# Patient Record
Sex: FEMALE | Race: WHITE | NOT HISPANIC OR LATINO | Employment: OTHER | URBAN - METROPOLITAN AREA
[De-identification: names, ages, dates, MRNs, and addresses within clinical notes are randomized per-mention and may not be internally consistent; named-entity substitution may affect disease eponyms.]

---

## 2021-07-15 ENCOUNTER — HISTORICAL (OUTPATIENT)
Dept: ADMINISTRATIVE | Facility: HOSPITAL | Age: 81
End: 2021-07-15

## 2021-07-15 LAB — POC CREATININE: 1.7 MG/DL (ref 0.6–1.3)

## 2022-09-13 ENCOUNTER — LAB VISIT (OUTPATIENT)
Dept: LAB | Facility: HOSPITAL | Age: 82
End: 2022-09-13
Attending: INTERNAL MEDICINE
Payer: MEDICARE

## 2022-09-13 DIAGNOSIS — K92.1 BLOOD IN STOOL: Primary | ICD-10-CM

## 2022-09-13 LAB
BASOPHILS # BLD AUTO: 0.05 X10(3)/MCL (ref 0–0.2)
BASOPHILS NFR BLD AUTO: 0.7 %
EOSINOPHIL # BLD AUTO: 0.39 X10(3)/MCL (ref 0–0.9)
EOSINOPHIL NFR BLD AUTO: 5.3 %
ERYTHROCYTE [DISTWIDTH] IN BLOOD BY AUTOMATED COUNT: 15.1 % (ref 11.5–17)
HCT VFR BLD AUTO: 37.9 % (ref 37–47)
HGB BLD-MCNC: 12 GM/DL (ref 12–16)
IMM GRANULOCYTES # BLD AUTO: 0.03 X10(3)/MCL (ref 0–0.04)
IMM GRANULOCYTES NFR BLD AUTO: 0.4 %
LYMPHOCYTES # BLD AUTO: 1.57 X10(3)/MCL (ref 0.6–4.6)
LYMPHOCYTES NFR BLD AUTO: 21.3 %
MCH RBC QN AUTO: 28.9 PG (ref 27–31)
MCHC RBC AUTO-ENTMCNC: 31.7 MG/DL (ref 33–36)
MCV RBC AUTO: 91.3 FL (ref 80–94)
MONOCYTES # BLD AUTO: 0.51 X10(3)/MCL (ref 0.1–1.3)
MONOCYTES NFR BLD AUTO: 6.9 %
NEUTROPHILS # BLD AUTO: 4.8 X10(3)/MCL (ref 2.1–9.2)
NEUTROPHILS NFR BLD AUTO: 65.4 %
NRBC BLD AUTO-RTO: 0 %
PLATELET # BLD AUTO: 188 X10(3)/MCL (ref 130–400)
PMV BLD AUTO: 11.6 FL (ref 7.4–10.4)
RBC # BLD AUTO: 4.15 X10(6)/MCL (ref 4.2–5.4)
WBC # SPEC AUTO: 7.4 X10(3)/MCL (ref 4.5–11.5)

## 2022-09-13 PROCEDURE — 85025 COMPLETE CBC W/AUTO DIFF WBC: CPT

## 2022-09-13 PROCEDURE — 36415 COLL VENOUS BLD VENIPUNCTURE: CPT

## 2022-11-14 ENCOUNTER — HOSPITAL ENCOUNTER (EMERGENCY)
Facility: HOSPITAL | Age: 82
Discharge: HOME | End: 2022-11-14
Attending: INTERNAL MEDICINE
Payer: MEDICARE

## 2022-11-14 VITALS
WEIGHT: 201 LBS | HEIGHT: 61 IN | DIASTOLIC BLOOD PRESSURE: 74 MMHG | RESPIRATION RATE: 18 BRPM | OXYGEN SATURATION: 98 % | BODY MASS INDEX: 37.95 KG/M2 | HEART RATE: 88 BPM | TEMPERATURE: 98 F | SYSTOLIC BLOOD PRESSURE: 197 MMHG

## 2022-11-14 DIAGNOSIS — R10.10 UPPER ABDOMINAL PAIN: Primary | ICD-10-CM

## 2022-11-14 DIAGNOSIS — K59.01 SLOW TRANSIT CONSTIPATION: ICD-10-CM

## 2022-11-14 LAB
APPEARANCE UR: ABNORMAL
BACTERIA #/AREA URNS AUTO: ABNORMAL /HPF
BILIRUB UR QL STRIP.AUTO: NEGATIVE MG/DL
COLOR UR AUTO: ABNORMAL
GLUCOSE UR QL STRIP.AUTO: NEGATIVE MG/DL
KETONES UR QL STRIP.AUTO: NEGATIVE MG/DL
LEUKOCYTE ESTERASE UR QL STRIP.AUTO: NEGATIVE UNIT/L
NITRITE UR QL STRIP.AUTO: NEGATIVE
PH UR STRIP.AUTO: 7 [PH]
PROT UR QL STRIP.AUTO: NEGATIVE MG/DL
RBC #/AREA URNS AUTO: ABNORMAL /HPF
RBC UR QL AUTO: ABNORMAL UNIT/L
SP GR UR STRIP.AUTO: 1.01
SQUAMOUS #/AREA URNS AUTO: ABNORMAL /HPF
UROBILINOGEN UR STRIP-ACNC: 0.2 MG/DL
WBC #/AREA URNS AUTO: ABNORMAL /HPF

## 2022-11-14 PROCEDURE — 81001 URINALYSIS AUTO W/SCOPE: CPT | Performed by: INTERNAL MEDICINE

## 2022-11-14 PROCEDURE — 81003 URINALYSIS AUTO W/O SCOPE: CPT | Performed by: INTERNAL MEDICINE

## 2022-11-14 PROCEDURE — 25000003 PHARM REV CODE 250: Performed by: INTERNAL MEDICINE

## 2022-11-14 PROCEDURE — 99284 EMERGENCY DEPT VISIT MOD MDM: CPT

## 2022-11-14 RX ORDER — MAG HYDROX/ALUMINUM HYD/SIMETH 200-200-20
30 SUSPENSION, ORAL (FINAL DOSE FORM) ORAL ONCE
Status: COMPLETED | OUTPATIENT
Start: 2022-11-14 | End: 2022-11-14

## 2022-11-14 RX ORDER — LIDOCAINE HYDROCHLORIDE 20 MG/ML
10 SOLUTION OROPHARYNGEAL ONCE
Status: COMPLETED | OUTPATIENT
Start: 2022-11-14 | End: 2022-11-14

## 2022-11-14 RX ORDER — ONDANSETRON 4 MG/1
4 TABLET, ORALLY DISINTEGRATING ORAL ONCE
Status: COMPLETED | OUTPATIENT
Start: 2022-11-14 | End: 2022-11-14

## 2022-11-14 RX ORDER — SUCRALFATE 1 G/1
1 TABLET ORAL
Qty: 40 TABLET | Refills: 0 | Status: SHIPPED | OUTPATIENT
Start: 2022-11-14 | End: 2022-11-24

## 2022-11-14 RX ORDER — LACTULOSE 10 G/15ML
20 SOLUTION ORAL 3 TIMES DAILY PRN
Qty: 473 ML | Refills: 1 | Status: SHIPPED | OUTPATIENT
Start: 2022-11-14

## 2022-11-14 RX ORDER — ONDANSETRON 4 MG/1
4 TABLET, ORALLY DISINTEGRATING ORAL EVERY 6 HOURS PRN
Qty: 15 TABLET | Refills: 0 | Status: SHIPPED | OUTPATIENT
Start: 2022-11-14

## 2022-11-14 RX ADMIN — LIDOCAINE HYDROCHLORIDE 10 ML: 20 SOLUTION ORAL; TOPICAL at 06:11

## 2022-11-14 RX ADMIN — ALUMINUM HYDROXIDE, MAGNESIUM HYDROXIDE, AND DIMETHICONE 30 ML: 200; 20; 200 SUSPENSION ORAL at 06:11

## 2022-11-14 RX ADMIN — ONDANSETRON 4 MG: 4 TABLET, ORALLY DISINTEGRATING ORAL at 06:11

## 2022-11-14 NOTE — ED PROVIDER NOTES
Source of History:  Patient, no limitations    Chief complaint:  Abdominal Pain      HPI:  Nell Mock is a 81 y.o. female presenting with Abdominal Pain         Patient presents for evaluation of abdominal pain. Onset of symptoms was gradual starting a few days ago with stable course since that time. The pain is located epigastric without radiation. The pain is rated as moderate. The pain is made worse by nothing and is relieved by nothing. The patient also complains of constipation and nausea. The patient denies fever, N/V. The pertinent past history includes hernia.         Review of Systems   Constitutional symptoms:  Negative except as documented in HPI.   Skin symptoms:  Negative except as documented in HPI.   HEENT symptoms:  Negative except as documented in HPI.   Respiratory symptoms:  Negative except as documented in HPI.   Cardiovascular symptoms:  Negative except as documented in HPI.   Gastrointestinal symptoms:  Negative except as documented in HPI.    Genitourinary symptoms:  Negative except as documented in HPI.   Musculoskeletal symptoms:  Negative except as documented in HPI.   Neurologic symptoms:  Negative except as documented in HPI.   Psychiatric symptoms:  Negative except as documented in HPI.   Allergy/immunologic symptoms:  Negative except as documented in HPI.             Additional review of systems information: All other systems reviewed and otherwise negative.      Review of patient's allergies indicates:   Allergen Reactions    Latex Swelling    Benazepril      cough  cough      Amoxicillin-pot clavulanate      Patient does not recall reaction type  Patient does not recall reaction type      Ciprofloxacin Rash     Other reaction(s): Bactrim  Urticaria,hives  Urticaria,hives  Urticaria,hives  Urticaria,hives      Sulfamethoxazole-trimethoprim Itching and Other (See Comments)     bleeding  Bleeding (C diff colitis)         PMH:  As per HPI and below:    No past medical history  "on file.     No family history on file.    No past surgical history on file.         There is no problem list on file for this patient.       Physical Exam:    BP (!) 197/74   Pulse 88   Temp 98.2 °F (36.8 °C)   Resp 18   Ht 5' 1" (1.549 m)   Wt 91.2 kg (201 lb)   SpO2 98%   Breastfeeding No   BMI 37.98 kg/m²     Nursing note and vital signs reviewed.    General:  Alert, no acute distress.   Skin: Normal for Ethnic Origin, No cyanosis  HEENT: Normocephalic and atraumatic, Vision unchanged, Pupils symmetric, No icterus , Nasal mucosa is pink and moist  Cardiovascular:  Regular rate and rhythm, No edema  Chest Wall: No deformity, equal chest rise  Respiratory:  Lungs are clear to auscultation, respirations are non-labored.    Musculoskeletal:  No deformity, Normal perfusion to all extremities  Back: No bony tenderness  : No suprapubic pain, No CVA tenderness  Gastrointestinal:  Soft, reducible ventral hernia, mild tenderness, no rebound, Non distended, Normal bowel sounds.    Neurological:  Alert and oriented to person, place, time, and situation, normal motor observed, normal speech observed.    Psychiatric:  Cooperative, appropriate mood & affect.        Labs that have been ordered have been independently reviewed and interpreted by myself.     Old Chart Reviewed.      Initial Impression/ Differential Dx:  GERD, intestinal spasm, gastroenteritis, gastritis, ulcer, cholecystitis, cholelithiasis, gallstones, pancreatitis, ileus, small bowel obstruction, appendicitis, diverticulitis, colitis, constipation, intestinal gas pain.      MDM:      Reviewed Nurses Note.    Reviewed Pertinent old records.    Orders Placed This Encounter    X-Ray Abdomen Flat And Erect    Urinalysis, Reflex to Urine Culture Urine, Clean Catch    Urinalysis, Microscopic    ondansetron disintegrating tablet 4 mg    AND Linked Order Group     aluminum-magnesium hydroxide-simethicone 200-200-20 mg/5 mL suspension 30 mL     LIDOcaine HCl " 2% oral solution 10 mL    lactulose (CHRONULAC) 20 gram/30 mL Soln    ondansetron (ZOFRAN-ODT) 4 MG TbDL    sucralfate (CARAFATE) 1 gram tablet                    Labs Reviewed   URINALYSIS, REFLEX TO URINE CULTURE - Abnormal; Notable for the following components:       Result Value    Appearance, UA Hazy (*)     Blood, UA Moderate (*)     All other components within normal limits   URINALYSIS, MICROSCOPIC - Abnormal; Notable for the following components:    RBC, UA 11-20 (*)     Squamous Epithelial Cells, UA Few (*)     All other components within normal limits          X-Ray Abdomen Flat And Erect   Final Result           Admission on 11/14/2022   Component Date Value Ref Range Status    Color, UA 11/14/2022 Straw  Yellow, Light-Yellow, Dark Yellow, Laurie, Straw Final    Appearance, UA 11/14/2022 Hazy (A)  Clear Final    Specific Gravity, UA 11/14/2022 1.010   Final    pH, UA 11/14/2022 7.0  5.0 - 8.5 Final    Protein, UA 11/14/2022 Negative  Negative mg/dL Final    Glucose, UA 11/14/2022 Negative  Negative, Normal mg/dL Final    Ketones, UA 11/14/2022 Negative  Negative mg/dL Final    Blood, UA 11/14/2022 Moderate (A)  Negative unit/L Final    Bilirubin, UA 11/14/2022 Negative  Negative mg/dL Final    Urobilinogen, UA 11/14/2022 0.2  0.2, 1.0, Normal mg/dL Final    Nitrites, UA 11/14/2022 Negative  Negative Final    Leukocyte Esterase, UA 11/14/2022 Negative  Negative unit/L Final    Bacteria, UA 11/14/2022 None Seen  None Seen, Rare, Occasional /HPF Final    RBC, UA 11/14/2022 11-20 (A)  None Seen, 0-2, 3-5, 0-5 /HPF Final    WBC, UA 11/14/2022 0-2  None Seen, 0-2, 3-5, 0-5 /HPF Final    Squamous Epithelial Cells, UA 11/14/2022 Few (A)  None Seen, Rare, Occasional, Occ /HPF Final       Imaging Results              X-Ray Abdomen Flat And Erect (Final result)  Result time 11/14/22 18:33:39      Final result by Ken Ko MD (11/14/22 18:33:39)                   Narrative:    EXAMINATION  XR ABDOMEN FLAT AND  ERECT    CLINICAL HISTORY  nausea;    TECHNIQUE  A total of 3 images submitted of the abdomen.    COMPARISON  None available at the time of initial interpretation.    FINDINGS  Lines/tubes/devices: none present    There is no evidence of free intraperitoneal air beneath the hemidiaphragms. No abnormal air-fluid levels or findings to suggest mechanical bowel obstruction are identified. No intra-abdominal mass effect is evident.    The visualized lung bases and cardiac contour are unremarkable. Included osseous structures are without acute abnormality.  Degenerative alterations of the spinal column and bony pelvis are incidentally noted.    IMPRESSION  No acute intra-abdominal abnormality.      Electronically signed by: Ken Ko  Date:    11/14/2022  Time:    18:33                                                                         Diagnostic Impression:    1. Upper abdominal pain    2. Slow transit constipation         ED Disposition Condition    Discharge Stable             Follow-up Information       [facility] - Emergency Dept.    Specialty: Emergency Medicine  Why: If symptoms worsen  Contact information:  [address]  [facility] [address] [phone]                            ED Prescriptions       Medication Sig Dispense Start Date End Date Auth. Provider    lactulose (CHRONULAC) 20 gram/30 mL Soln Take 30 mLs (20 g total) by mouth 3 (three) times daily as needed (constipation). 473 mL 11/14/2022 -- Jeff Fletcher, DO    ondansetron (ZOFRAN-ODT) 4 MG TbDL Take 1 tablet (4 mg total) by mouth every 6 (six) hours as needed. 15 tablet 11/14/2022 -- Jeff Fletcher, DO    sucralfate (CARAFATE) 1 gram tablet Take 1 tablet (1 g total) by mouth 4 (four) times daily before meals and nightly. for 10 days 40 tablet 11/14/2022 11/24/2022 Jeff Fletcher, DO          Follow-up Information       Follow up With Specialties Details Why Contact Info     [facility] - Emergency Dept Emergency Medicine  If symptoms worsen [address]  [facility] [id number] [phone]             Jeff Fletcher DO  11/14/22 1846

## 2023-03-29 ENCOUNTER — HOSPITAL ENCOUNTER (EMERGENCY)
Facility: HOSPITAL | Age: 83
Discharge: HOME | End: 2023-03-29
Attending: STUDENT IN AN ORGANIZED HEALTH CARE EDUCATION/TRAINING PROGRAM
Payer: MEDICARE

## 2023-03-29 VITALS
DIASTOLIC BLOOD PRESSURE: 73 MMHG | HEIGHT: 61 IN | WEIGHT: 199 LBS | HEART RATE: 84 BPM | OXYGEN SATURATION: 97 % | RESPIRATION RATE: 20 BRPM | BODY MASS INDEX: 37.57 KG/M2 | TEMPERATURE: 97 F | SYSTOLIC BLOOD PRESSURE: 182 MMHG

## 2023-03-29 DIAGNOSIS — R10.13 EPIGASTRIC ABDOMINAL PAIN: ICD-10-CM

## 2023-03-29 DIAGNOSIS — K21.9 GASTROESOPHAGEAL REFLUX DISEASE, UNSPECIFIED WHETHER ESOPHAGITIS PRESENT: Primary | ICD-10-CM

## 2023-03-29 LAB
ALBUMIN SERPL-MCNC: 3.9 G/DL (ref 3.4–4.8)
ALBUMIN/GLOB SERPL: 1.1 RATIO (ref 1.1–2)
ALP SERPL-CCNC: 149 UNIT/L (ref 40–150)
ALT SERPL-CCNC: 17 UNIT/L (ref 0–55)
APPEARANCE UR: CLEAR
AST SERPL-CCNC: 32 UNIT/L (ref 5–34)
BACTERIA #/AREA URNS AUTO: ABNORMAL /HPF
BASOPHILS # BLD AUTO: 0.05 X10(3)/MCL (ref 0–0.2)
BASOPHILS NFR BLD AUTO: 1 %
BILIRUB UR QL STRIP.AUTO: NEGATIVE MG/DL
BILIRUBIN DIRECT+TOT PNL SERPL-MCNC: 0.2 MG/DL
BUN SERPL-MCNC: 17.3 MG/DL (ref 9.8–20.1)
CALCIUM SERPL-MCNC: 10.4 MG/DL (ref 8.4–10.2)
CHLORIDE SERPL-SCNC: 106 MMOL/L (ref 98–107)
CO2 SERPL-SCNC: 25 MMOL/L (ref 23–31)
COLOR UR AUTO: YELLOW
CREAT SERPL-MCNC: 1.17 MG/DL (ref 0.55–1.02)
EOSINOPHIL # BLD AUTO: 0.06 X10(3)/MCL (ref 0–0.9)
EOSINOPHIL NFR BLD AUTO: 1.3 %
ERYTHROCYTE [DISTWIDTH] IN BLOOD BY AUTOMATED COUNT: 14.6 % (ref 11.5–17)
GFR SERPLBLD CREATININE-BSD FMLA CKD-EPI: 47 MLS/MIN/1.73/M2
GLOBULIN SER-MCNC: 3.5 GM/DL (ref 2.4–3.5)
GLUCOSE SERPL-MCNC: 102 MG/DL (ref 82–115)
GLUCOSE UR QL STRIP.AUTO: NEGATIVE MG/DL
HCT VFR BLD AUTO: 42.8 % (ref 37–47)
HGB BLD-MCNC: 13.5 G/DL (ref 12–16)
IMM GRANULOCYTES # BLD AUTO: 0 X10(3)/MCL (ref 0–0.04)
IMM GRANULOCYTES NFR BLD AUTO: 0 %
KETONES UR QL STRIP.AUTO: NEGATIVE MG/DL
LEUKOCYTE ESTERASE UR QL STRIP.AUTO: NEGATIVE UNIT/L
LIPASE SERPL-CCNC: 72 U/L
LYMPHOCYTES # BLD AUTO: 1.59 X10(3)/MCL (ref 0.6–4.6)
LYMPHOCYTES NFR BLD AUTO: 33.1 %
MCH RBC QN AUTO: 27.6 PG (ref 27–31)
MCHC RBC AUTO-ENTMCNC: 31.5 G/DL (ref 33–36)
MCV RBC AUTO: 87.5 FL (ref 80–94)
MONOCYTES # BLD AUTO: 0.28 X10(3)/MCL (ref 0.1–1.3)
MONOCYTES NFR BLD AUTO: 5.8 %
NEUTROPHILS # BLD AUTO: 2.82 X10(3)/MCL (ref 2.1–9.2)
NEUTROPHILS NFR BLD AUTO: 58.8 %
NITRITE UR QL STRIP.AUTO: NEGATIVE
NRBC BLD AUTO-RTO: 0 %
PH UR STRIP.AUTO: 5.5 [PH]
PLATELET # BLD AUTO: 157 X10(3)/MCL (ref 130–400)
PMV BLD AUTO: 10.7 FL (ref 7.4–10.4)
POTASSIUM SERPL-SCNC: 4.2 MMOL/L (ref 3.5–5.1)
PROT SERPL-MCNC: 7.4 GM/DL (ref 5.8–7.6)
PROT UR QL STRIP.AUTO: NEGATIVE MG/DL
RBC # BLD AUTO: 4.89 X10(6)/MCL (ref 4.2–5.4)
RBC #/AREA URNS AUTO: ABNORMAL /HPF
RBC UR QL AUTO: ABNORMAL UNIT/L
SODIUM SERPL-SCNC: 143 MMOL/L (ref 136–145)
SP GR UR STRIP.AUTO: 1.02
SQUAMOUS #/AREA URNS AUTO: ABNORMAL /HPF
TROPONIN I SERPL-MCNC: 0.01 NG/ML (ref 0–0.04)
UROBILINOGEN UR STRIP-ACNC: 0.2 MG/DL
WBC # SPEC AUTO: 4.8 X10(3)/MCL (ref 4.5–11.5)
WBC #/AREA URNS AUTO: ABNORMAL /HPF

## 2023-03-29 PROCEDURE — 93005 ELECTROCARDIOGRAM TRACING: CPT

## 2023-03-29 PROCEDURE — 93010 ELECTROCARDIOGRAM REPORT: CPT | Mod: ,,, | Performed by: INTERNAL MEDICINE

## 2023-03-29 PROCEDURE — 84484 ASSAY OF TROPONIN QUANT: CPT | Performed by: PHYSICIAN ASSISTANT

## 2023-03-29 PROCEDURE — 85025 COMPLETE CBC W/AUTO DIFF WBC: CPT | Performed by: PHYSICIAN ASSISTANT

## 2023-03-29 PROCEDURE — 93010 EKG 12-LEAD: ICD-10-PCS | Mod: ,,, | Performed by: INTERNAL MEDICINE

## 2023-03-29 PROCEDURE — 80053 COMPREHEN METABOLIC PANEL: CPT | Performed by: PHYSICIAN ASSISTANT

## 2023-03-29 PROCEDURE — 25000003 PHARM REV CODE 250: Performed by: STUDENT IN AN ORGANIZED HEALTH CARE EDUCATION/TRAINING PROGRAM

## 2023-03-29 PROCEDURE — 81001 URINALYSIS AUTO W/SCOPE: CPT | Performed by: PHYSICIAN ASSISTANT

## 2023-03-29 PROCEDURE — 99284 EMERGENCY DEPT VISIT MOD MDM: CPT

## 2023-03-29 PROCEDURE — 83690 ASSAY OF LIPASE: CPT | Performed by: PHYSICIAN ASSISTANT

## 2023-03-29 RX ORDER — MAG HYDROX/ALUMINUM HYD/SIMETH 200-200-20
30 SUSPENSION, ORAL (FINAL DOSE FORM) ORAL ONCE
Status: COMPLETED | OUTPATIENT
Start: 2023-03-29 | End: 2023-03-29

## 2023-03-29 RX ORDER — PANTOPRAZOLE SODIUM 40 MG/1
40 TABLET, DELAYED RELEASE ORAL DAILY
Qty: 30 TABLET | Refills: 11 | Status: SHIPPED | OUTPATIENT
Start: 2023-03-29 | End: 2024-03-28

## 2023-03-29 RX ORDER — LIDOCAINE HYDROCHLORIDE 20 MG/ML
15 SOLUTION OROPHARYNGEAL ONCE
Status: COMPLETED | OUTPATIENT
Start: 2023-03-29 | End: 2023-03-29

## 2023-03-29 RX ADMIN — LIDOCAINE HYDROCHLORIDE 15 ML: 20 SOLUTION ORAL; TOPICAL at 06:03

## 2023-03-29 RX ADMIN — ALUMINUM HYDROXIDE, MAGNESIUM HYDROXIDE, AND SIMETHICONE 30 ML: 200; 200; 20 SUSPENSION ORAL at 06:03

## 2023-03-29 NOTE — FIRST PROVIDER EVALUATION
"Medical screening examination initiated.  I have conducted a focused provider triage encounter, findings are as follows:    Chief Complaint   Patient presents with    Abdominal Pain     Pt to er c/o recurrent intermittent abd pain onset 5 days ago, denies n/v/d.     Brief history of present illness: 82 y.o. female presents to the ED with epigastric abdominal pain onset 5 days ago.  Denies shortness of breath, nausea, vomiting, fever, chills.  History of a hiatal hernia.    BP (!) 182/73 (BP Location: Left arm, Patient Position: Sitting)   Pulse 84   Temp 97.2 °F (36.2 °C) (Temporal)   Resp 20   Ht 5' 1" (1.549 m)   Wt 90.3 kg (199 lb)   SpO2 97%   BMI 37.60 kg/m²     Pertinent physical exam: Awake, alert, ambulatory, non-labored respirations    Brief workup plan:  labs, EKG, cxr    Preliminary workup initiated; this workup will be continued and followed by the physician or advanced practice provider that is assigned to the patient when roomed.  "

## 2023-03-29 NOTE — ED PROVIDER NOTES
Encounter Date: 3/29/2023       History     Chief Complaint   Patient presents with    Abdominal Pain     Pt to er c/o recurrent intermittent abd pain onset 5 days ago, denies n/v/d.     HPI    82-year-old female with a past medical history of hypertension, CML, CKD, GERD with hiatal hernia presents emergency department for epigastric pain which she states is burning.  Patient states she thinks it is her gastritis flaring.  She denies any chest pain or shortness of breath.  No vomiting or nausea.  No constipation or diarrhea.    Review of patient's allergies indicates:   Allergen Reactions    Latex Swelling    Benazepril      cough  cough      Amoxicillin-pot clavulanate      Patient does not recall reaction type  Patient does not recall reaction type      Ciprofloxacin Rash     Other reaction(s): Bactrim  Urticaria,hives  Urticaria,hives  Urticaria,hives  Urticaria,hives      Sulfamethoxazole-trimethoprim Itching and Other (See Comments)     bleeding  Bleeding (C diff colitis)       No past medical history on file.  No past surgical history on file.  No family history on file.     Review of Systems   Constitutional:  Negative for fever.   Respiratory:  Negative for cough and shortness of breath.    Cardiovascular:  Negative for chest pain.   Gastrointestinal:  Positive for abdominal pain. Negative for constipation, diarrhea, nausea and vomiting.   All other systems reviewed and are negative.    Physical Exam     Initial Vitals [03/29/23 1607]   BP Pulse Resp Temp SpO2   (!) 182/73 84 20 97.2 °F (36.2 °C) 97 %      MAP       --         Physical Exam    Nursing note and vitals reviewed.  Constitutional: She appears well-developed and well-nourished. She is Obese . No distress.   Cardiovascular:  Normal rate and regular rhythm.           Pulmonary/Chest: No respiratory distress.   Abdominal: Abdomen is soft. There is abdominal tenderness (epigastric).   Musculoskeletal:         General: No tenderness. Normal range of  motion.     Neurological: She is alert.   Skin: Skin is warm. Capillary refill takes less than 2 seconds.   Psychiatric: She has a normal mood and affect. Thought content normal.       ED Course   Procedures  Labs Reviewed   COMPREHENSIVE METABOLIC PANEL - Abnormal; Notable for the following components:       Result Value    Creatinine 1.17 (*)     Calcium Level Total 10.4 (*)     All other components within normal limits   LIPASE - Abnormal; Notable for the following components:    Lipase Level 72 (*)     All other components within normal limits   URINALYSIS, REFLEX TO URINE CULTURE - Abnormal; Notable for the following components:    Blood, UA Small (*)     All other components within normal limits   CBC WITH DIFFERENTIAL - Abnormal; Notable for the following components:    MCHC 31.5 (*)     MPV 10.7 (*)     All other components within normal limits   URINALYSIS, MICROSCOPIC - Abnormal; Notable for the following components:    Bacteria, UA Few (*)     Squamous Epithelial Cells, UA Moderate (*)     All other components within normal limits   TROPONIN I - Normal   CBC W/ AUTO DIFFERENTIAL    Narrative:     The following orders were created for panel order CBC auto differential.  Procedure                               Abnormality         Status                     ---------                               -----------         ------                     CBC with Differential[[id number]]        Abnormal            Final result                 Please view results for these tests on the individual orders.     EKG Readings: (Independently Interpreted)   Initial Reading: No STEMI. Rhythm: Normal Sinus Rhythm. Heart Rate: 69. Ectopy: No Ectopy. Conduction: RBBB. ST Segments: Normal ST Segments. T Waves: Normal. Clinical Impression: Normal Sinus Rhythm with RBBB     Imaging Results    None          Medications   aluminum-magnesium hydroxide-simethicone 200-200-20 mg/5 mL suspension 30 mL (30 mLs Oral Given 3/29/23 2168)      And   LIDOcaine HCl 2% oral solution 15 mL (15 mLs Oral Given 3/29/23 1802)     Medical Decision Making:   Differential Diagnosis:   GERD/gastritis, pancreatitis, ACS, cholecystitis  ED Management:  Laboratory works all within normal limits.  Lipase only minimally elevated.  Will give a GI cocktail and reassess.  Patient agrees with the plan.   Medical Decision Making  Problems Addressed:  Gastroesophageal reflux disease, unspecified whether esophagitis present: chronic illness or injury with exacerbation, progression, or side effects of treatment    Amount and/or Complexity of Data Reviewed  External Data Reviewed: labs and notes.  Labs: ordered. Decision-making details documented in ED Course.  Radiology: ordered and independent interpretation performed. Decision-making details documented in ED Course.    Risk  OTC drugs.  Prescription drug management.              ED Course as of 03/29/23 1843   Wed Mar 29, 2023   1841 Patient resting in bed in no acute distress.  Vital signs significantly improved.  Patient states her pain is resolved after GI cocktail.  Laboratory results are within normal limits.  No indication for CT and patient agrees.  Will discharge with some Protonix.  Patient will follow back up with a GI doctor.  ER precautions given. [BS]      ED Course User Index  [BS] Dm Conrad MD                 Clinical Impression:   Final diagnoses:  [R10.13] Epigastric abdominal pain  [K21.9] Gastroesophageal reflux disease, unspecified whether esophagitis present (Primary)        ED Disposition Condition    Discharge Stable          ED Prescriptions       Medication Sig Dispense Start Date End Date Auth. Provider    pantoprazole (PROTONIX) 40 MG tablet Take 1 tablet (40 mg total) by mouth once daily. 30 tablet 3/29/2023 3/28/2024 Dm Conrad MD          Follow-up Information       Follow up With Specialties Details Why Contact Info    Tasia May MD Family Medicine Schedule an appointment  as soon as possible for a visit   [address]  [address]  [facility] [address]  [phone]      [facility] - Emergency Dept Emergency Medicine Go to  If symptoms worsen [address]  [facility] [phone]    Follow-up with your GI doctor                 Dm Conrad MD  03/29/23 9553

## 2023-06-01 ENCOUNTER — HOSPITAL ENCOUNTER (EMERGENCY)
Facility: HOSPITAL | Age: 83
Discharge: HOME | End: 2023-06-01
Attending: STUDENT IN AN ORGANIZED HEALTH CARE EDUCATION/TRAINING PROGRAM
Payer: MEDICARE

## 2023-06-01 VITALS
BODY MASS INDEX: 37 KG/M2 | HEIGHT: 61 IN | HEART RATE: 82 BPM | DIASTOLIC BLOOD PRESSURE: 69 MMHG | WEIGHT: 196 LBS | TEMPERATURE: 98 F | RESPIRATION RATE: 20 BRPM | OXYGEN SATURATION: 97 % | SYSTOLIC BLOOD PRESSURE: 186 MMHG

## 2023-06-01 DIAGNOSIS — H11.31 SUBCONJUNCTIVAL HEMORRHAGE OF RIGHT EYE: Primary | ICD-10-CM

## 2023-06-01 LAB
APTT PPP: 29.2 SECONDS (ref 23.4–33.9)
BASOPHILS # BLD AUTO: 0.06 X10(3)/MCL
BASOPHILS NFR BLD AUTO: 1 %
EOSINOPHIL # BLD AUTO: 0.14 X10(3)/MCL (ref 0–0.9)
EOSINOPHIL NFR BLD AUTO: 2.3 %
ERYTHROCYTE [DISTWIDTH] IN BLOOD BY AUTOMATED COUNT: 14.6 % (ref 11.5–17)
HCT VFR BLD AUTO: 41.5 % (ref 37–47)
HGB BLD-MCNC: 13.7 G/DL (ref 12–16)
IMM GRANULOCYTES # BLD AUTO: 0.01 X10(3)/MCL (ref 0–0.04)
IMM GRANULOCYTES NFR BLD AUTO: 0.2 %
INR BLD: 0.87 (ref 2–3)
LYMPHOCYTES # BLD AUTO: 1.86 X10(3)/MCL (ref 0.6–4.6)
LYMPHOCYTES NFR BLD AUTO: 30.8 %
MCH RBC QN AUTO: 28.1 PG (ref 27–31)
MCHC RBC AUTO-ENTMCNC: 33 G/DL (ref 33–36)
MCV RBC AUTO: 85 FL (ref 80–94)
MONOCYTES # BLD AUTO: 0.37 X10(3)/MCL (ref 0.1–1.3)
MONOCYTES NFR BLD AUTO: 6.1 %
NEUTROPHILS # BLD AUTO: 3.59 X10(3)/MCL (ref 2.1–9.2)
NEUTROPHILS NFR BLD AUTO: 59.6 %
NRBC BLD AUTO-RTO: 0 %
PLATELET # BLD AUTO: 150 X10(3)/MCL (ref 130–400)
PMV BLD AUTO: 10.7 FL (ref 7.4–10.4)
PROTHROMBIN TIME: 11.9 SECONDS (ref 11.7–14.5)
RBC # BLD AUTO: 4.88 X10(6)/MCL (ref 4.2–5.4)
WBC # SPEC AUTO: 6.03 X10(3)/MCL (ref 4.5–11.5)

## 2023-06-01 PROCEDURE — 85025 COMPLETE CBC W/AUTO DIFF WBC: CPT | Performed by: STUDENT IN AN ORGANIZED HEALTH CARE EDUCATION/TRAINING PROGRAM

## 2023-06-01 PROCEDURE — 85730 THROMBOPLASTIN TIME PARTIAL: CPT | Performed by: STUDENT IN AN ORGANIZED HEALTH CARE EDUCATION/TRAINING PROGRAM

## 2023-06-01 PROCEDURE — 25000003 PHARM REV CODE 250: Performed by: STUDENT IN AN ORGANIZED HEALTH CARE EDUCATION/TRAINING PROGRAM

## 2023-06-01 PROCEDURE — 85610 PROTHROMBIN TIME: CPT | Performed by: STUDENT IN AN ORGANIZED HEALTH CARE EDUCATION/TRAINING PROGRAM

## 2023-06-01 PROCEDURE — 99283 EMERGENCY DEPT VISIT LOW MDM: CPT

## 2023-06-01 RX ORDER — TETRACAINE HYDROCHLORIDE 5 MG/ML
2 SOLUTION OPHTHALMIC
Status: COMPLETED | OUTPATIENT
Start: 2023-06-01 | End: 2023-06-01

## 2023-06-01 RX ADMIN — TETRACAINE HYDROCHLORIDE 2 DROP: 5 SOLUTION OPHTHALMIC at 07:06

## 2023-06-02 NOTE — ED PROVIDER NOTES
Encounter Date: 6/1/2023       History     Chief Complaint   Patient presents with    Eye Pain     Pt to er with right eye pain. Pt states that she believes a vessel ruptured when she was wiping up a spill on her floors      HPI    82-year-old female with a past medical history of hypertension, CML in remission, CKD, GERD and a hiatal hernia presents emergency department for right eye pain and redness.  Patient states that she had to bend over to clean something off her floor and when she stood up she noticed that her right eye was red.  She thinks that she might have strained and popped blood vessel in her eye.  Denies any visual changes.  States that her vision is always blurry because she has some cataracts.  No headache.  No pain with eye movement.    Review of patient's allergies indicates:   Allergen Reactions    Latex Swelling    Benazepril      cough  cough      Amoxicillin-pot clavulanate      Patient does not recall reaction type  Patient does not recall reaction type      Ciprofloxacin Rash     Other reaction(s): Bactrim  Urticaria,hives  Urticaria,hives  Urticaria,hives  Urticaria,hives      Shellfish containing products Nausea Only    Sulfamethoxazole-trimethoprim Itching and Other (See Comments)     bleeding  Bleeding (C diff colitis)       Past Medical History:   Diagnosis Date    Essential (primary) hypertension      Past Surgical History:   Procedure Laterality Date    APPENDECTOMY      BLADDER SURGERY      GLAUCOMA SURGERY      HYSTERECTOMY      NECK SURGERY      SINUS SURGERY      x3    TONSILLECTOMY       History reviewed. No pertinent family history.  Social History     Tobacco Use    Smoking status: Never    Smokeless tobacco: Never   Substance Use Topics    Alcohol use: Yes     Comment: wine yearly     Review of Systems   Constitutional:  Negative for fever.   Eyes:  Positive for pain and redness. Negative for photophobia, discharge, itching and visual disturbance.   Respiratory:  Negative for  cough.    Cardiovascular:  Negative for chest pain.   Gastrointestinal:  Negative for abdominal pain.   All other systems reviewed and are negative.    Physical Exam     Initial Vitals [06/01/23 1928]   BP Pulse Resp Temp SpO2   (!) 186/69 82 20 98.3 °F (36.8 °C) 97 %      MAP       --         Physical Exam    Nursing note and vitals reviewed.  Constitutional: She appears well-developed and well-nourished. No distress.   Eyes: EOM are normal. Pupils are equal, round, and reactive to light. Right eye exhibits no discharge. Left eye exhibits no discharge. Right conjunctiva is not injected. Right conjunctiva has a hemorrhage (Subconjunctival hemorrhage). Left conjunctiva is not injected. Left conjunctiva has no hemorrhage.   Intra-ocular pressure of right eye 21.0; pressure of left eye 19.1   Cardiovascular:  Normal rate and regular rhythm.           Pulmonary/Chest: Breath sounds normal. No respiratory distress.   Abdominal: Abdomen is soft.   Musculoskeletal:         General: No tenderness. Normal range of motion.     Neurological: She is alert and oriented to person, place, and time.   Skin: Skin is warm. Capillary refill takes less than 2 seconds.   Psychiatric: She has a normal mood and affect. Thought content normal.       ED Course   Procedures  Labs Reviewed   PROTIME-INR - Abnormal; Notable for the following components:       Result Value    INR 0.87 (*)     All other components within normal limits   CBC WITH DIFFERENTIAL - Abnormal; Notable for the following components:    MPV 10.7 (*)     All other components within normal limits   APTT - Normal   CBC W/ AUTO DIFFERENTIAL    Narrative:     The following orders were created for panel order CBC auto differential.  Procedure                               Abnormality         Status                     ---------                               -----------         ------                     CBC with Differential[[id number]]        Abnormal            Final result                  Please view results for these tests on the individual orders.          Imaging Results    None          Medications   TETRAcaine HCl (PF) 0.5 % Drop 2 drop (2 drops Right Eye Given 6/1/23 1937)     Medical Decision Making:   Differential Diagnosis:   Subconjunctival hemorrhage, glaucoma, retinal detachment, bleeding disorder/coagulopathy  ED Management:  Will obtain basic blood work and coags.  Patient has an ophthalmologist who she can follow up with tomorrow   Medical Decision Making  Problems Addressed:  Subconjunctival hemorrhage of right eye: acute illness or injury    Amount and/or Complexity of Data Reviewed  External Data Reviewed: notes.  Labs: ordered. Decision-making details documented in ED Course.    Risk  OTC drugs.              ED Course as of 06/01/23 2036 Thu Jun 01, 2023 2029 WBC: 6.03 [BS]   2029 Hemoglobin: 13.7 [BS]   2029 Hematocrit: 41.5 [BS]   2029 Platelets: 150 [BS]   2034 INR(!): 0.87 [BS]   2034 Protime: 11.9 [BS]   2034 aPTT: 29.2 [BS]   2035 Platelets and coags normal.  Will discharge.  Patient will follow-up with ophthalmology in the morning [BS]      ED Course User Index  [BS] Dm Conrad MD                 Clinical Impression:   Final diagnoses:  [H11.31] Subconjunctival hemorrhage of right eye (Primary)        ED Disposition Condition    Discharge Stable          ED Prescriptions    None       Follow-up Information       Follow up With Specialties Details Why Contact Info    Tasia May MD Family Medicine Schedule an appointment as soon as possible for a visit   [address] [facility]  [address]  [facility] [address]  [phone]      [facility] - Emergency Dept Emergency Medicine Go to  If symptoms worsen [address]  [facility] [id number] [phone]    Casey Arechiga MD Ophthalmology Go in 1 day  [address]  [address]  [facility] [address]  [phone]               Dm Conrad MD  06/01/23  2036

## 2023-09-13 ENCOUNTER — ANESTHESIA EVENT (OUTPATIENT)
Dept: ENDOSCOPY | Facility: HOSPITAL | Age: 83
End: 2023-09-13
Payer: MEDICARE

## 2023-09-13 ENCOUNTER — ANESTHESIA (OUTPATIENT)
Dept: ENDOSCOPY | Facility: HOSPITAL | Age: 83
End: 2023-09-13
Payer: MEDICARE

## 2023-09-13 ENCOUNTER — HOSPITAL ENCOUNTER (OUTPATIENT)
Facility: HOSPITAL | Age: 83
Discharge: HOME | End: 2023-09-13
Attending: INTERNAL MEDICINE | Admitting: INTERNAL MEDICINE
Payer: MEDICARE

## 2023-09-13 PROBLEM — R93.89 ABNORMAL CT SCAN: Status: ACTIVE | Noted: 2023-09-13

## 2023-09-13 PROBLEM — K92.1 MELENA: Status: ACTIVE | Noted: 2023-09-13

## 2023-09-13 PROBLEM — K57.30 DIVERTICULA OF COLON: Status: ACTIVE | Noted: 2023-09-13

## 2023-09-13 PROCEDURE — 63600175 PHARM REV CODE 636 W HCPCS: Performed by: NURSE ANESTHETIST, CERTIFIED REGISTERED

## 2023-09-13 PROCEDURE — 37000008 HC ANESTHESIA 1ST 15 MINUTES: Performed by: INTERNAL MEDICINE

## 2023-09-13 PROCEDURE — 37000009 HC ANESTHESIA EA ADD 15 MINS: Performed by: INTERNAL MEDICINE

## 2023-09-13 PROCEDURE — D9220A PRA ANESTHESIA: Mod: CRNA,,, | Performed by: NURSE ANESTHETIST, CERTIFIED REGISTERED

## 2023-09-13 PROCEDURE — D9220A PRA ANESTHESIA: Mod: ANES,,, | Performed by: ANESTHESIOLOGY

## 2023-09-13 PROCEDURE — D9220A PRA ANESTHESIA: ICD-10-PCS | Mod: CRNA,,, | Performed by: NURSE ANESTHETIST, CERTIFIED REGISTERED

## 2023-09-13 PROCEDURE — 25000003 PHARM REV CODE 250: Performed by: NURSE ANESTHETIST, CERTIFIED REGISTERED

## 2023-09-13 PROCEDURE — 45378 DIAGNOSTIC COLONOSCOPY: CPT | Performed by: INTERNAL MEDICINE

## 2023-09-13 PROCEDURE — D9220A PRA ANESTHESIA: ICD-10-PCS | Mod: ANES,,, | Performed by: ANESTHESIOLOGY

## 2023-09-13 RX ORDER — LIDOCAINE HYDROCHLORIDE 20 MG/ML
INJECTION INTRAVENOUS
Status: DISCONTINUED | OUTPATIENT
Start: 2023-09-13 | End: 2023-09-13

## 2023-09-13 RX ORDER — LIDOCAINE HYDROCHLORIDE 20 MG/ML
INJECTION, SOLUTION EPIDURAL; INFILTRATION; INTRACAUDAL; PERINEURAL
Status: DISCONTINUED
Start: 2023-09-13 | End: 2023-09-13 | Stop reason: HOSPADM

## 2023-09-13 RX ORDER — NIFEDIPINE 60 MG/1
60 TABLET, EXTENDED RELEASE ORAL DAILY
COMMUNITY

## 2023-09-13 RX ORDER — SODIUM CHLORIDE 9 MG/ML
INJECTION, SOLUTION INTRAVENOUS ONCE
Status: DISCONTINUED | OUTPATIENT
Start: 2023-09-13 | End: 2023-09-13 | Stop reason: HOSPADM

## 2023-09-13 RX ORDER — PROPOFOL 10 MG/ML
VIAL (ML) INTRAVENOUS
Status: DISCONTINUED
Start: 2023-09-13 | End: 2023-09-13 | Stop reason: HOSPADM

## 2023-09-13 RX ORDER — PROPOFOL 10 MG/ML
VIAL (ML) INTRAVENOUS
Status: DISCONTINUED | OUTPATIENT
Start: 2023-09-13 | End: 2023-09-13

## 2023-09-13 RX ORDER — SODIUM CHLORIDE 9 MG/ML
INJECTION, SOLUTION INTRAVENOUS CONTINUOUS PRN
Status: DISCONTINUED | OUTPATIENT
Start: 2023-09-13 | End: 2023-09-13

## 2023-09-13 RX ADMIN — PROPOFOL 20 MG: 10 INJECTION, EMULSION INTRAVENOUS at 08:09

## 2023-09-13 RX ADMIN — PROPOFOL 40 MG: 10 INJECTION, EMULSION INTRAVENOUS at 08:09

## 2023-09-13 RX ADMIN — SODIUM CHLORIDE: 9 INJECTION, SOLUTION INTRAVENOUS at 08:09

## 2023-09-13 RX ADMIN — PROPOFOL 30 MG: 10 INJECTION, EMULSION INTRAVENOUS at 08:09

## 2023-09-13 RX ADMIN — LIDOCAINE HYDROCHLORIDE 100 MG: 20 INJECTION INTRAVENOUS at 08:09

## 2023-09-13 NOTE — ANESTHESIA POSTPROCEDURE EVALUATION
Anesthesia Post Evaluation    Patient: Nell Mock    Procedure(s) Performed: Procedure(s) (LRB):  COLON (N/A)    Final Anesthesia Type: general      Patient location during evaluation: PACU  Patient participation: Yes- Able to Participate  Level of consciousness: awake and alert and oriented  Post-procedure vital signs: reviewed and stable  Pain management: adequate  Airway patency: patent  SHIRA mitigation strategies: Verification of full reversal of neuromuscular block  PONV status at discharge: No PONV  Anesthetic complications: no      Cardiovascular status: blood pressure returned to baseline and stable  Respiratory status: spontaneous ventilation and unassisted  Hydration status: euvolemic  Follow-up not needed.  Comments: [facility]          Vitals Value Taken Time   /73 09/13/23 0847     09/13/23 0857   Pulse 75 09/13/23 0847   Resp 19 09/13/23 0847   SpO2 98 % 09/13/23 0847         No case tracking events are documented in the log.      Pain/Mak Score: Mak Score: 10 (9/13/2023  8:41 AM)

## 2023-09-13 NOTE — ANESTHESIA PREPROCEDURE EVALUATION
09/13/2023  Nell Mock is a 82 y.o., female.  Procedure Information    Case: [id number] Date/Time: 09/13/23 0800   Procedure: COLON (Abdomen) - LATEX ALLERGY   Anesthesia type: Gen Natural Airway   Diagnosis:        Abnormal CT scan [R93.89]       Melena [K92.1]   Location: [facility] ENDO 02 / [facility] ENDOSCOPY   Surgeons: Camilo Gordillo MD         Pre-op Assessment    I have reviewed the Patient Summary Reports.     I have reviewed the Nursing Notes. I have reviewed the NPO Status.   I have reviewed the Medications.     Review of Systems  Anesthesia Hx:  No problems with previous Anesthesia    Hematology/Oncology:  Hematology Normal   Oncology Normal     EENT/Dental:EENT/Dental Normal   Cardiovascular:   Exercise tolerance: good Hypertension  Functional Capacity good / => 4 METS    Pulmonary:  Pulmonary Normal    Renal/:   Denies Chronic Renal Disease.     Hepatic/GI:  Hepatic/GI Normal    Musculoskeletal:  Musculoskeletal Normal    Neurological:  Neurology Normal    Endocrine:  Endocrine Normal  Denies Morbid Obesity / BMI > 40  Dermatological:  Skin Normal    Psych:  Psychiatric Normal           Physical Exam  General: Alert, Oriented, Well nourished and Cooperative    Airway:  Mallampati: II   Mouth Opening: Normal  TM Distance: Normal  Tongue: Normal  Neck ROM: Normal ROM    Dental:  Intact    Chest/Lungs:  Clear to auscultation, Normal Respiratory Rate    Heart:  Rate: Normal  Rhythm: Regular Rhythm       Latest Reference Range & Units Most Recent   WBC 4.50 - 11.50 x10(3)/mcL 6.03  6/1/23 20:11   RBC 4.20 - 5.40 x10(6)/mcL 4.88  6/1/23 20:11   Hemoglobin 12.0 - 16.0 g/dL 13.7  6/1/23 20:11   Hematocrit 37.0 - 47.0 % 41.5  6/1/23 20:11   MCV 80.0 - 94.0 fL 85.0  6/1/23 20:11   MCH 27.0 - 31.0 pg 28.1  6/1/23 20:11   MCHC 33.0 - 36.0 g/dL 33.0  6/1/23 20:11   RDW 11.5 - 17.0 % 14.6  6/1/23  20:11   Platelets 130 - 400 x10(3)/mcL 150  6/1/23 20:11   MPV 7.4 - 10.4 fL 10.7 (H)  6/1/23 20:11   Neutrophils Relative Not Estab. % 71 (E)  2/10/22 09:18   Neut % % 59.6  6/1/23 20:11   LYMPH % % 30.8  6/1/23 20:11   Lymphocytes % Not Estab. % 19 (E)  2/10/22 09:18   Mono % % 6.1  6/1/23 20:11   Eosinophil % % 2.3  6/1/23 20:11   Basophil % % 1.0  6/1/23 20:11   Immature Granulocytes % 0.2  6/1/23 20:11   Neutrophils, Abs 1.4 - 7.0 x10E3/uL 3.7 (E)  2/10/22 09:18   Neut # 2.1 - 9.2 x10(3)/mcL 3.59  6/1/23 20:11   Lymph # 0.6 - 4.6 x10(3)/mcL 1.86  6/1/23 20:11   Lymphocytes Absolute 0.7 - 3.1 x10E3/uL 1.0 (E)  2/10/22 09:18   Mono # 0.1 - 1.3 x10(3)/mcL 0.37  6/1/23 20:11   Eos # 0 - 0.9 x10(3)/mcL 0.14  6/1/23 20:11   Baso # <=0.2 x10(3)/mcL 0.06  6/1/23 20:11   Immature Grans (Abs) 0 - 0.04 x10(3)/mcL 0.01  6/1/23 20:11   nRBC % 0.0  6/1/23 20:11   Folate 7.3 - 19.9 NG/ML 17.1 (E)  4/6/23 08:56   Vitamin B-12 213 - 816 PG/ (E)  4/6/23 08:56   Protime 11.7 - 14.5 seconds 11.9  6/1/23 20:11   INR 2.00 - 3.00  0.87 (L)  6/1/23 20:11   aPTT 23.4 - 33.9 seconds 29.2  6/1/23 20:11   Sodium 136 - 145 mmol/L 143  3/29/23 16:18   Potassium 3.5 - 5.1 mmol/L 4.2  3/29/23 16:18   Potassium 3.5 - 5.1 mmol/L 3.5 (E)  4/30/22 05:27   Chloride 98 - 107 mmol/L 106  3/29/23 16:18   CO2 23 - 31 mmol/L 25  3/29/23 16:18   Anion Gap 8 - 16 mmol/L 8 (E)  4/30/22 05:27   BUN 9.8 - 20.1 mg/dL 17.3  3/29/23 16:18   Creatinine 0.55 - 1.02 mg/dL 1.17 (H)  3/29/23 16:18   eGFR mls/min/1.73/m2 47  3/29/23 16:18   eGFR if African American mL/min/1.73mSq 44 (E)  4/30/22 05:27   Glucose 82 - 115 mg/dL 102  3/29/23 16:18   Calcium 8.4 - 10.2 mg/dL 10.4 (H)  3/29/23 16:18   Phosphorus 2.3 - 4.7 MG/DL 3.3 (E)  4/6/23 07:28   Magnesium  1.6 - 2.6 MG/DL 1.8 (E)  4/6/23 07:28   Alkaline Phosphatase 40 - 150 unit/L 149  3/29/23 16:18   PROTEIN TOTAL 5.8 - 7.6 gm/dL 7.4  3/29/23 16:18   Albumin 3.4 - 4.8 g/dL 3.9  3/29/23 16:18    Albumin/Globulin Ratio 1.1 - 2.0 ratio 1.1  3/29/23 16:18   Uric Acid 2.6 - 6.0 MG/DL 6.7 (H) (E)  4/6/23 07:28   BILIRUBIN TOTAL <=1.5 mg/dL 0.2  3/29/23 16:18   AST 5 - 34 unit/L 32  3/29/23 16:18   ALT 0 - 55 unit/L 17  3/29/23 16:18   Lipase <=60 U/L 72 (H)  3/29/23 16:18   Globulin, Total 2.4 - 3.5 gm/dL 3.5  3/29/23 16:18   CPK 26 - 192 U/L 37 (E)  8/22/21 22:19   CPK MB 0.0 - 3.6 ng/ml 0.5 (E)  8/22/21 22:19    - 250 U/L 165 (E)  10/8/21 07:40   Troponin I 0.000 - 0.045 ng/mL 0.015  3/29/23 16:18   Influenza A Ag, EIA >Negative  Negative  1/1/18 13:47   Influenza B Ag, EIA >Negative  Negative  1/1/18 13:47   Color, UA Yellow, Light-Yellow, Dark Yellow, Laurie, Straw  Yellow  3/29/23 16:11   Appearance, UA Clear  Clear  3/29/23 16:11   Specific Gravity,UA  1.020  3/29/23 16:11   pH, UA 5.0 - 8.5  5.5  3/29/23 16:11   Protein, UA Negative mg/dL Negative  3/29/23 16:11   Glucose, UA Negative, Normal mg/dL Negative  3/29/23 16:11   Ketones, UA Negative mg/dL Negative  3/29/23 16:11   Occult Blood UA Negative unit/L Small !  3/29/23 16:11   NITRITE UA Negative  Negative  3/29/23 16:11   Bilirubin, UA Negative mg/dL Negative  3/29/23 16:11   Urobilinogen, UA 0.2, 1.0, Normal mg/dL 0.2  3/29/23 16:11   Leukocytes, UA Negative unit/L Negative  3/29/23 16:11   RBC, UA None Seen, 0-2, 3-5, 0-5 /HPF 3-5  3/29/23 16:11   WBC, UA None Seen, 0-2, 3-5, 0-5 /HPF 0-2  3/29/23 16:11   Bacteria, UA None Seen, Rare, Occasional /HPF Few !  3/29/23 16:11   Squam Epithel, UA None Seen, Rare, Occasional, Occ /HPF Moderate !  3/29/23 16:11   Baso, CSF Not Estab. % 1 (E)  2/10/22 09:18   STREP ONLY CULTURE [facility]  Rpt  1/1/18 13:47   POC Creatinine 0.6 - 1.3 mg/dL 1.7 (H)  7/15/21 13:59   CT ABDOMEN PELVIS WITH CONTRAST  Rpt (E)  8/5/20 11:49   CT ABDOMEN PELVIS WITHOUT CONTRAST  Rpt (E)  8/10/22 00:35   CT CHEST WITH CONTRAST  Rpt (E)  8/5/20 11:45   CT RENAL STONE STUDY ABD PELVIS WO  Rpt (E)  7/18/15 19:58   CT SOFT TISSUE NECK  WITH CONTRAST  Rpt (E)  12/9/21 12:08   CT TEMPORAL BONE WITHOUT CONTRAST  Rpt  7/15/21 15:09   CT CHEST ABDOMEN PELVIS WITH CONTRAST (XPD)  Rpt (E)  12/9/21 12:08   XR ABDOMEN FLAT AND ERECT  Rpt  11/14/22 18:30   XR CHEST 1 VIEW  Rpt (E)  4/25/22 11:35   XR CHEST PA AND LATERAL  Rpt (E)  6/2/19 18:11   XR CHEST PA LATERAL WITH LORDOTIC VIEW  Rpt  1/23/16 15:37   MAMMO DIGITAL SCREENING BILAT  Rpt (E)  1/12/23 11:08   US BREAST LEFT COMPLETE  Rpt (E)  11/28/17 15:20   US LOWER EXTREMITY VEINS BILATERAL  Rpt (E)  11/30/21 21:55   EKG 12-LEAD  Rpt  3/29/23 17:17   CARDIAC CATHETERIZATION  Rpt (E)  4/29/22 14:59   CV US DOPPLER VENOUS LEG RIGHT (CUPID ONLY)  Rpt (E)  11/1/18 16:39   CT CHEST ABDOMEN PELVIS W W/O CONTRAST GYN ONC (XPD)  Rpt (E)  7/5/18 17:00   CTA CHEST AORTA NON CORONARY  Rpt (E)  4/25/22 13:45   Glucose Screen 70 - 100 mg/dL 84 (E)  4/30/22 05:27   INFLUENZA A AND B ANTIGEN  Rpt  1/1/18 13:47   Mean Corpuscular Volume 79 - 97 fL 93 (E)  2/10/22 09:18   Monocytes Absolute Count 0.1 - 0.9 x10E3/uL 0.4 (E)  2/10/22 09:18   Monocytes % Not Estab. % 8 (E)  2/10/22 09:18   POC CREATININE  Rpt !  7/15/21 13:59   PTT 1:1 Initial 21 - 35 SECONDS 26 (E)  7/29/20 08:48   Red Blood Cell Count 3.77 - 5.28 x10E6/uL 3.87 (E)  2/10/22 09:18   Vitamin D, 25-OH, Total 30.0 - 100.0 ng/mL 62.7 (E)  2/10/22 09:18   (H): Data is abnormally high  (L): Data is abnormally low  !: Data is abnormal  (E): External lab result  Rpt: View report in Results Review for more informationVent. Rate : 069 BPM     Atrial Rate : 069 BPM      P-R Int : 202 ms          QRS Dur : 136 ms       QT Int : 466 ms       P-R-T Axes : 070 049 040 degrees      QTc Int : 499 ms     Normal sinus rhythm   Right bundle branch block   Abnormal ECG   No previous ECGs available   Confirmed by Mariano Gupta MD ([id number]) on 3/30/2023 11:29:01 AM     Referred By: JOSE L     Anesthesia Plan  Type of Anesthesia, risks & benefits discussed:    Anesthesia Type:  Gen Natural Airway  Intra-op Monitoring Plan: Standard ASA Monitors  Post Op Pain Control Plan: multimodal analgesia  Induction:  IV  Airway Plan: Direct  Informed Consent: Informed consent signed with the Patient and all parties understand the risks and agree with anesthesia plan.  All questions answered. Patient consented to blood products? Yes  ASA Score: 3  Day of Surgery Review of History & Physical: H&P Update referred to the surgeon/provider.    Ready For Surgery From Anesthesia Perspective.     .
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
## 2023-09-13 NOTE — OP NOTE
Colonoscopy Procedure Note    Date of procedure: 09/13/2023     Surgeon: Camilo Gordillo    Referring MD:[email or web address]      Procedure: Colonoscopy    Indications:  Abnormal CT scan with circumferential thickening of the rectosigmoid junction concerning for colonic mass       Post op Diagnosis:  Good colon prep, occasional left-sided diverticula, anastomosis at 20 cm, but no clear-cut stricture and no evidence for colonic neoplasia through to the cecum and into a normal terminal ileum        Sedation: Per anesthesia:       Procedure Details: The patient was brought to the endoscopy suite after the risks, benefits, and alternatives of the procedure were described and the patient was given the opportunity to ask questions and signed informed consent. Patient was positioned in the left lateral decubitus position, continuous monitoring was initiated, and supplemental oxygen was provided via nasal cannula. Adequate sedation was achieved with the medications documented in chart and titrated during the procedure. A digital rectal exam was performed. Under direct visualization the colonoscope was introduced into the rectum and advanced to the cecum. The ileocecal valve and appendiceal orifice were identified. The terminal ileum was   intubated. The scope was withdrawn, and the mucosa was examined in a circular fashion. Retroflexion was done in the rectum. Air was evacuated from the colon and the procedure was completed. The patient tolerated the procedure well and was transferred to the recovery area in stable condition.     Findings:  The patient had a good preparation.  She does have a few diverticula left.  We encountered an anastomosis at 20 cm, but scope passage was quite easy.  There was some diverticula even in that region, and perhaps mild mucosal edema but no other mucosal abnormality to warrant biopsy.  We saw through to a normal terminal ileum    Impression and Recommendations:   Diverticulosis, patent anastomosis in the  rectosigmoid, no evidence for colonic neoplasia through to the terminal ileum.  If she is asymptomatic we may well leave well enough alone.  She will continue to follow up with Dr. Patel gastro guarding her CLL, and given these subcentimeter pulmonary nodules noted in her lung bases on CT.  She will continue with a bit of MiraLax daily and with acid suppression as a routine       Camilo Gordillo MD

## 2023-09-13 NOTE — DISCHARGE SUMMARY
[facility] Endoscopy  Discharge Note  Short Stay    Procedure(s) (LRB):  COLON (N/A)      OUTCOME: Patient tolerated treatment/procedure well without complication and is now ready for discharge.    DISPOSITION: Home or Self Care    FINAL DIAGNOSIS:  <principal problem not specified>    FOLLOWUP: With primary care provider    DISCHARGE INSTRUCTIONS:  No discharge procedures on file.      Clinical Reference Documents Added to Patient Instructions         Document    COLONOSCOPY DISCHARGE INSTRUCTIONS (ENGLISH)            TIME SPENT ON DISCHARGE: 15 minutes

## 2023-09-13 NOTE — TRANSFER OF CARE
"Anesthesia Transfer of Care Note    Patient: Nell Mock    Procedure(s) Performed: Procedure(s) (LRB):  COLON (N/A)    Patient location: GI    Anesthesia Type: general    Transport from OR: Transported from OR on room air with adequate spontaneous ventilation    Post pain: adequate analgesia    Post assessment: no apparent anesthetic complications and tolerated procedure well    Post vital signs: stable    Level of consciousness: awake and alert    Nausea/Vomiting: no nausea/vomiting    Complications: none    Transfer of care protocol was followed      Last vitals:   Visit Vitals  /63   Pulse 67   Temp 36.1 °C (97 °F)   Resp 16   Ht 5' 1" (1.549 m)   Wt 89.8 kg (198 lb)   SpO2 96%   BMI 37.41 kg/m²     "

## 2023-09-14 VITALS
HEIGHT: 61 IN | HEART RATE: 81 BPM | BODY MASS INDEX: 37.38 KG/M2 | WEIGHT: 198 LBS | DIASTOLIC BLOOD PRESSURE: 80 MMHG | SYSTOLIC BLOOD PRESSURE: 178 MMHG | OXYGEN SATURATION: 97 % | TEMPERATURE: 98 F | RESPIRATION RATE: 12 BRPM

## 2024-02-12 ENCOUNTER — HOSPITAL ENCOUNTER (EMERGENCY)
Facility: HOSPITAL | Age: 84
Discharge: HOME | End: 2024-02-12
Attending: EMERGENCY MEDICINE
Payer: COMMERCIAL

## 2024-02-12 VITALS
DIASTOLIC BLOOD PRESSURE: 85 MMHG | SYSTOLIC BLOOD PRESSURE: 176 MMHG | WEIGHT: 200 LBS | OXYGEN SATURATION: 98 % | HEART RATE: 100 BPM | BODY MASS INDEX: 37.76 KG/M2 | HEIGHT: 61 IN | RESPIRATION RATE: 18 BRPM | TEMPERATURE: 99 F

## 2024-02-12 DIAGNOSIS — R51.9 ACUTE NONINTRACTABLE HEADACHE, UNSPECIFIED HEADACHE TYPE: ICD-10-CM

## 2024-02-12 DIAGNOSIS — S16.1XXA CERVICAL STRAIN, ACUTE, INITIAL ENCOUNTER: ICD-10-CM

## 2024-02-12 DIAGNOSIS — R91.8 PULMONARY NODULES: ICD-10-CM
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
## 2024-02-12 LAB
ALBUMIN SERPL-MCNC: 4.1 G/DL (ref 3.4–4.8)
ALBUMIN/GLOB SERPL: 1.3 RATIO (ref 1.1–2)
ALP SERPL-CCNC: 158 UNIT/L (ref 40–150)
ALT SERPL-CCNC: 20 UNIT/L (ref 0–55)
APPEARANCE UR: CLEAR
AST SERPL-CCNC: 33 UNIT/L (ref 5–34)
BACTERIA #/AREA URNS AUTO: NORMAL /HPF
BASOPHILS # BLD AUTO: 0.05 X10(3)/MCL
BASOPHILS NFR BLD AUTO: 0.6 %
BILIRUB SERPL-MCNC: 0.4 MG/DL
BILIRUB UR QL STRIP.AUTO: NEGATIVE
BUN SERPL-MCNC: 21.5 MG/DL (ref 9.8–20.1)
CALCIUM SERPL-MCNC: 10.2 MG/DL (ref 8.4–10.2)
CHLORIDE SERPL-SCNC: 104 MMOL/L (ref 98–107)
CO2 SERPL-SCNC: 27 MMOL/L (ref 23–31)
COLOR UR AUTO: YELLOW
CREAT SERPL-MCNC: 1.3 MG/DL (ref 0.55–1.02)
EOSINOPHIL # BLD AUTO: 0.14 X10(3)/MCL (ref 0–0.9)
EOSINOPHIL NFR BLD AUTO: 1.7 %
ERYTHROCYTE [DISTWIDTH] IN BLOOD BY AUTOMATED COUNT: 13.3 % (ref 11.5–17)
GFR SERPLBLD CREATININE-BSD FMLA CKD-EPI: 41 MLS/MIN/1.73/M2
GLOBULIN SER-MCNC: 3.2 GM/DL (ref 2.4–3.5)
GLUCOSE SERPL-MCNC: 116 MG/DL (ref 82–115)
GLUCOSE UR QL STRIP.AUTO: NEGATIVE
HCT VFR BLD AUTO: 44.2 % (ref 37–47)
HGB BLD-MCNC: 14.9 G/DL (ref 12–16)
IMM GRANULOCYTES # BLD AUTO: 0.02 X10(3)/MCL (ref 0–0.04)
IMM GRANULOCYTES NFR BLD AUTO: 0.2 %
KETONES UR QL STRIP.AUTO: NEGATIVE
LACTATE SERPL-SCNC: 1.6 MMOL/L (ref 0.5–2.2)
LEUKOCYTE ESTERASE UR QL STRIP.AUTO: NEGATIVE
LIPASE SERPL-CCNC: 54 U/L
LYMPHOCYTES # BLD AUTO: 2.24 X10(3)/MCL (ref 0.6–4.6)
LYMPHOCYTES NFR BLD AUTO: 26.4 %
MCH RBC QN AUTO: 29.6 PG (ref 27–31)
MCHC RBC AUTO-ENTMCNC: 33.7 G/DL (ref 33–36)
MCV RBC AUTO: 87.7 FL (ref 80–94)
MONOCYTES # BLD AUTO: 0.45 X10(3)/MCL (ref 0.1–1.3)
MONOCYTES NFR BLD AUTO: 5.3 %
NEUTROPHILS # BLD AUTO: 5.58 X10(3)/MCL (ref 2.1–9.2)
NEUTROPHILS NFR BLD AUTO: 65.8 %
NITRITE UR QL STRIP.AUTO: NEGATIVE
NRBC BLD AUTO-RTO: 0 %
PH UR STRIP.AUTO: 7.5 [PH]
PLATELET # BLD AUTO: 186 X10(3)/MCL (ref 130–400)
PMV BLD AUTO: 10.2 FL (ref 7.4–10.4)
POTASSIUM SERPL-SCNC: 3.6 MMOL/L (ref 3.5–5.1)
PROT SERPL-MCNC: 7.3 GM/DL (ref 5.8–7.6)
PROT UR QL STRIP.AUTO: NEGATIVE
RBC # BLD AUTO: 5.04 X10(6)/MCL (ref 4.2–5.4)
RBC #/AREA URNS AUTO: NORMAL /HPF
RBC UR QL AUTO: ABNORMAL
SODIUM SERPL-SCNC: 144 MMOL/L (ref 136–145)
SP GR UR STRIP.AUTO: 1.01 (ref 1–1.03)
SQUAMOUS #/AREA URNS AUTO: NORMAL /HPF
UROBILINOGEN UR STRIP-ACNC: 0.2
WBC # SPEC AUTO: 8.48 X10(3)/MCL (ref 4.5–11.5)
WBC #/AREA URNS AUTO: NORMAL /HPF

## 2024-02-12 PROCEDURE — 83605 ASSAY OF LACTIC ACID: CPT | Performed by: EMERGENCY MEDICINE

## 2024-02-12 PROCEDURE — 81003 URINALYSIS AUTO W/O SCOPE: CPT | Performed by: EMERGENCY MEDICINE

## 2024-02-12 PROCEDURE — 80053 COMPREHEN METABOLIC PANEL: CPT | Performed by: EMERGENCY MEDICINE

## 2024-02-12 PROCEDURE — 99284 EMERGENCY DEPT VISIT MOD MDM: CPT | Mod: 25

## 2024-02-12 PROCEDURE — 85025 COMPLETE CBC W/AUTO DIFF WBC: CPT | Performed by: EMERGENCY MEDICINE

## 2024-02-12 PROCEDURE — 83690 ASSAY OF LIPASE: CPT | Performed by: EMERGENCY MEDICINE

## 2024-02-12 PROCEDURE — 25000003 PHARM REV CODE 250: Performed by: EMERGENCY MEDICINE

## 2024-02-12 RX ORDER — ACETAMINOPHEN 500 MG
1000 TABLET ORAL
Status: COMPLETED | OUTPATIENT
Start: 2024-02-12 | End: 2024-02-12

## 2024-02-12 RX ADMIN — ACETAMINOPHEN 1000 MG: 500 TABLET ORAL at 07:02
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
## 2024-04-09 ENCOUNTER — HOSPITAL ENCOUNTER (OUTPATIENT)
Dept: RADIOLOGY | Facility: CLINIC | Age: 84
Discharge: HOME | End: 2024-04-09
Attending: ORTHOPAEDIC SURGERY
Payer: MEDICARE

## 2024-04-09 ENCOUNTER — OFFICE VISIT (OUTPATIENT)
Dept: ORTHOPEDICS | Facility: CLINIC | Age: 84
End: 2024-04-09
Payer: MEDICARE

## 2024-04-09 VITALS
HEIGHT: 61 IN | HEART RATE: 74 BPM | WEIGHT: 199 LBS | BODY MASS INDEX: 37.57 KG/M2 | SYSTOLIC BLOOD PRESSURE: 142 MMHG | DIASTOLIC BLOOD PRESSURE: 75 MMHG

## 2024-04-09 DIAGNOSIS — M25.551 RIGHT HIP PAIN: ICD-10-CM

## 2024-04-09 DIAGNOSIS — M25.551 RIGHT HIP PAIN: Primary | ICD-10-CM

## 2024-04-09 DIAGNOSIS — M16.11 PRIMARY OSTEOARTHRITIS OF RIGHT HIP: ICD-10-CM

## 2024-04-09 PROCEDURE — 73502 X-RAY EXAM HIP UNI 2-3 VIEWS: CPT | Mod: RT,,, | Performed by: ORTHOPAEDIC SURGERY

## 2024-04-09 PROCEDURE — 99204 OFFICE O/P NEW MOD 45 MIN: CPT | Mod: ,,, | Performed by: ORTHOPAEDIC SURGERY

## 2024-04-09 RX ORDER — AMOXICILLIN 500 MG
CAPSULE ORAL DAILY
COMMUNITY

## 2024-04-09 RX ORDER — MELOXICAM 15 MG/1
15 TABLET ORAL DAILY
Qty: 30 TABLET | Refills: 2 | Status: ON HOLD | OUTPATIENT
Start: 2024-04-09 | End: 2024-06-12 | Stop reason: HOSPADM
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
## 2024-04-13 ENCOUNTER — HOSPITAL ENCOUNTER (INPATIENT)
Facility: HOSPITAL | Age: 84
LOS: 2 days | Discharge: HOME | DRG: 313 | End: 2024-04-15
Attending: EMERGENCY MEDICINE | Admitting: INTERNAL MEDICINE
Payer: MEDICARE

## 2024-04-13 DIAGNOSIS — R07.9 CHEST PAIN, UNSPECIFIED TYPE: Primary | ICD-10-CM

## 2024-04-13 DIAGNOSIS — R07.9 CHEST PAIN: ICD-10-CM

## 2024-04-13 LAB
ALBUMIN SERPL-MCNC: 3.5 G/DL (ref 3.4–4.8)
ALBUMIN/GLOB SERPL: 1 RATIO (ref 1.1–2)
ALP SERPL-CCNC: 143 UNIT/L (ref 40–150)
ALT SERPL-CCNC: 20 UNIT/L (ref 0–55)
AST SERPL-CCNC: 34 UNIT/L (ref 5–34)
BASOPHILS # BLD AUTO: 0.05 X10(3)/MCL
BASOPHILS NFR BLD AUTO: 0.9 %
BILIRUB SERPL-MCNC: 0.3 MG/DL
BNP BLD-MCNC: 32.6 PG/ML
BUN SERPL-MCNC: 21.2 MG/DL (ref 9.8–20.1)
CALCIUM SERPL-MCNC: 9.3 MG/DL (ref 8.4–10.2)
CHLORIDE SERPL-SCNC: 106 MMOL/L (ref 98–107)
CO2 SERPL-SCNC: 27 MMOL/L (ref 23–31)
CREAT SERPL-MCNC: 1.12 MG/DL (ref 0.55–1.02)
EOSINOPHIL # BLD AUTO: 0.14 X10(3)/MCL (ref 0–0.9)
EOSINOPHIL NFR BLD AUTO: 2.5 %
ERYTHROCYTE [DISTWIDTH] IN BLOOD BY AUTOMATED COUNT: 13.8 % (ref 11.5–17)
GFR SERPLBLD CREATININE-BSD FMLA CKD-EPI: 50 MLS/MIN/1.73/M2
GLOBULIN SER-MCNC: 3.4 GM/DL (ref 2.4–3.5)
GLUCOSE SERPL-MCNC: 100 MG/DL (ref 82–115)
HCT VFR BLD AUTO: 41.6 % (ref 37–47)
HGB BLD-MCNC: 13.8 G/DL (ref 12–16)
IMM GRANULOCYTES # BLD AUTO: 0.01 X10(3)/MCL (ref 0–0.04)
IMM GRANULOCYTES NFR BLD AUTO: 0.2 %
LYMPHOCYTES # BLD AUTO: 2.24 X10(3)/MCL (ref 0.6–4.6)
LYMPHOCYTES NFR BLD AUTO: 39.7 %
MCH RBC QN AUTO: 29.1 PG (ref 27–31)
MCHC RBC AUTO-ENTMCNC: 33.2 G/DL (ref 33–36)
MCV RBC AUTO: 87.8 FL (ref 80–94)
MONOCYTES # BLD AUTO: 0.3 X10(3)/MCL (ref 0.1–1.3)
MONOCYTES NFR BLD AUTO: 5.3 %
NEUTROPHILS # BLD AUTO: 2.9 X10(3)/MCL (ref 2.1–9.2)
NEUTROPHILS NFR BLD AUTO: 51.4 %
NRBC BLD AUTO-RTO: 0 %
PLATELET # BLD AUTO: 161 X10(3)/MCL (ref 130–400)
PMV BLD AUTO: 11.1 FL (ref 7.4–10.4)
POTASSIUM SERPL-SCNC: 3.6 MMOL/L (ref 3.5–5.1)
PROT SERPL-MCNC: 6.9 GM/DL (ref 5.8–7.6)
RBC # BLD AUTO: 4.74 X10(6)/MCL (ref 4.2–5.4)
SODIUM SERPL-SCNC: 144 MMOL/L (ref 136–145)
TROPONIN I SERPL-MCNC: 0.02 NG/ML (ref 0–0.04)
WBC # SPEC AUTO: 5.64 X10(3)/MCL (ref 4.5–11.5)

## 2024-04-13 PROCEDURE — 93010 ELECTROCARDIOGRAM REPORT: CPT | Mod: ,,, | Performed by: INTERNAL MEDICINE

## 2024-04-13 PROCEDURE — 83880 ASSAY OF NATRIURETIC PEPTIDE: CPT | Performed by: EMERGENCY MEDICINE

## 2024-04-13 PROCEDURE — 93005 ELECTROCARDIOGRAM TRACING: CPT

## 2024-04-13 PROCEDURE — 80053 COMPREHEN METABOLIC PANEL: CPT | Performed by: EMERGENCY MEDICINE

## 2024-04-13 PROCEDURE — 84484 ASSAY OF TROPONIN QUANT: CPT | Performed by: EMERGENCY MEDICINE

## 2024-04-13 PROCEDURE — 63600175 PHARM REV CODE 636 W HCPCS: Performed by: EMERGENCY MEDICINE

## 2024-04-13 PROCEDURE — 25000003 PHARM REV CODE 250: Performed by: EMERGENCY MEDICINE

## 2024-04-13 PROCEDURE — 85025 COMPLETE CBC W/AUTO DIFF WBC: CPT | Performed by: EMERGENCY MEDICINE

## 2024-04-13 PROCEDURE — 21400001 HC TELEMETRY ROOM

## 2024-04-13 PROCEDURE — 11000001 HC ACUTE MED/SURG PRIVATE ROOM

## 2024-04-13 RX ORDER — ASPIRIN 325 MG
325 TABLET ORAL
Status: COMPLETED | OUTPATIENT
Start: 2024-04-13 | End: 2024-04-13

## 2024-04-13 RX ORDER — ENOXAPARIN SODIUM 100 MG/ML
1 INJECTION SUBCUTANEOUS
Status: COMPLETED | OUTPATIENT
Start: 2024-04-13 | End: 2024-04-13

## 2024-04-13 RX ORDER — SODIUM CHLORIDE 0.9 % (FLUSH) 0.9 %
10 SYRINGE (ML) INJECTION
Status: DISCONTINUED | OUTPATIENT
Start: 2024-04-14 | End: 2024-04-15 | Stop reason: HOSPADM

## 2024-04-13 RX ORDER — TALC
6 POWDER (GRAM) TOPICAL NIGHTLY PRN
Status: DISCONTINUED | OUTPATIENT
Start: 2024-04-14 | End: 2024-04-15 | Stop reason: HOSPADM

## 2024-04-13 RX ADMIN — ASPIRIN 325 MG: 325 TABLET ORAL at 09:04

## 2024-04-13 RX ADMIN — NITROGLYCERIN 1 INCH: 20 OINTMENT TOPICAL at 09:04

## 2024-04-13 RX ADMIN — ENOXAPARIN SODIUM 90 MG: 100 INJECTION SUBCUTANEOUS at 11:04

## 2024-04-13 NOTE — Clinical Note
Diagnosis: Chest pain [091059]  Future Attending Provider: ROSMERY LINDSAY [[id number]]  Admit to which facility:: [facility] [[id number]]  Reason for IP Medical Treatment  (Clinical interventions that can only be accomplished  in the IP setting? ) :: unstable angina  I certify that Inpatient services for greater than or equal to 2 midnights are medically necessary:: Yes  Plans for Post-Acute care--if anticipated (pick the single best option):: A. No post acute care antici pated at this time

## 2024-04-14 LAB
AV INDEX (PROSTH): 0.75
AV MEAN GRADIENT: 4 MMHG
AV PEAK GRADIENT: 8 MMHG
AV VALVE AREA BY VELOCITY RATIO: 2.31 CM²
AV VALVE AREA: 2.37 CM²
AV VELOCITY RATIO: 0.74
BSA FOR ECHO PROCEDURE: 1.97 M2
CHOLEST SERPL-MCNC: 183 MG/DL
CHOLEST/HDLC SERPL: 3 {RATIO} (ref 0–5)
CV ECHO LV RWT: 0.42 CM
DOP CALC AO PEAK VEL: 1.37 M/S
DOP CALC AO VTI: 30 CM
DOP CALC LVOT AREA: 3.1 CM2
DOP CALC LVOT DIAMETER: 2 CM
DOP CALC LVOT PEAK VEL: 1.01 M/S
DOP CALC LVOT STROKE VOLUME: 70.96 CM3
DOP CALC MV VTI: 31 CM
DOP CALCLVOT PEAK VEL VTI: 22.6 CM
E WAVE DECELERATION TIME: 238 MSEC
E/A RATIO: 0.61
E/E' RATIO: 8.38 M/S
ECHO LV POSTERIOR WALL: 0.93 CM (ref 0.6–1.1)
FRACTIONAL SHORTENING: 26 % (ref 28–44)
HDLC SERPL-MCNC: 70 MG/DL (ref 35–60)
INTERVENTRICULAR SEPTUM: 0.88 CM (ref 0.6–1.1)
LDLC SERPL CALC-MCNC: 93 MG/DL (ref 50–140)
LEFT ATRIUM SIZE: 3.5 CM
LEFT ATRIUM VOLUME INDEX MOD: 14.7 ML/M2
LEFT ATRIUM VOLUME MOD: 27.7 CM3
LEFT INTERNAL DIMENSION IN SYSTOLE: 3.31 CM (ref 2.1–4)
LEFT VENTRICLE DIASTOLIC VOLUME INDEX: 48.67 ML/M2
LEFT VENTRICLE DIASTOLIC VOLUME: 91.5 ML
LEFT VENTRICLE MASS INDEX: 71 G/M2
LEFT VENTRICLE SYSTOLIC VOLUME INDEX: 23.7 ML/M2
LEFT VENTRICLE SYSTOLIC VOLUME: 44.5 ML
LEFT VENTRICULAR INTERNAL DIMENSION IN DIASTOLE: 4.48 CM (ref 3.5–6)
LEFT VENTRICULAR MASS: 132.84 G
LV LATERAL E/E' RATIO: 7.44 M/S
LV SEPTAL E/E' RATIO: 9.57 M/S
LVOT MG: 2 MMHG
LVOT MV: 0.67 CM/S
MV MEAN GRADIENT: 2 MMHG
MV PEAK A VEL: 1.09 M/S
MV PEAK E VEL: 0.67 M/S
MV PEAK GRADIENT: 4 MMHG
MV STENOSIS PRESSURE HALF TIME: 40 MS
MV VALVE AREA BY CONTINUITY EQUATION: 2.29 CM2
MV VALVE AREA P 1/2 METHOD: 5.5 CM2
OHS LV EJECTION FRACTION SIMPSONS BIPLANE MOD: 63 %
PV PEAK GRADIENT: 4 MMHG
PV PEAK VELOCITY: 0.98 M/S
RA PRESSURE ESTIMATED: 3 MMHG
TDI LATERAL: 0.09 M/S
TDI SEPTAL: 0.07 M/S
TDI: 0.08 M/S
TRICUSPID ANNULAR PLANE SYSTOLIC EXCURSION: 1.57 CM
TRIGL SERPL-MCNC: 102 MG/DL (ref 37–140)
TROPONIN I SERPL-MCNC: 0.03 NG/ML (ref 0–0.04)
VLDLC SERPL CALC-MCNC: 20 MG/DL
Z-SCORE OF LEFT VENTRICULAR DIMENSION IN END DIASTOLE: -1.59
Z-SCORE OF LEFT VENTRICULAR DIMENSION IN END SYSTOLE: 0.18

## 2024-04-14 PROCEDURE — 11000001 HC ACUTE MED/SURG PRIVATE ROOM

## 2024-04-14 PROCEDURE — 80061 LIPID PANEL: CPT | Performed by: INTERNAL MEDICINE

## 2024-04-14 PROCEDURE — 25000003 PHARM REV CODE 250: Performed by: INTERNAL MEDICINE

## 2024-04-14 PROCEDURE — 84484 ASSAY OF TROPONIN QUANT: CPT | Performed by: EMERGENCY MEDICINE

## 2024-04-14 PROCEDURE — 99285 EMERGENCY DEPT VISIT HI MDM: CPT | Mod: 25

## 2024-04-14 PROCEDURE — 21400001 HC TELEMETRY ROOM

## 2024-04-14 PROCEDURE — 96374 THER/PROPH/DIAG INJ IV PUSH: CPT

## 2024-04-14 PROCEDURE — 25000003 PHARM REV CODE 250: Performed by: NURSE PRACTITIONER

## 2024-04-14 PROCEDURE — 63600175 PHARM REV CODE 636 W HCPCS: Performed by: INTERNAL MEDICINE

## 2024-04-14 RX ORDER — HYDRALAZINE HYDROCHLORIDE 20 MG/ML
10 INJECTION INTRAMUSCULAR; INTRAVENOUS EVERY 6 HOURS PRN
Status: DISCONTINUED | OUTPATIENT
Start: 2024-04-14 | End: 2024-04-15 | Stop reason: HOSPADM

## 2024-04-14 RX ORDER — ENOXAPARIN SODIUM 100 MG/ML
40 INJECTION SUBCUTANEOUS EVERY 24 HOURS
Status: DISCONTINUED | OUTPATIENT
Start: 2024-04-14 | End: 2024-04-15 | Stop reason: HOSPADM

## 2024-04-14 RX ORDER — NIFEDIPINE 60 MG/1
60 TABLET, EXTENDED RELEASE ORAL DAILY
Status: DISCONTINUED | OUTPATIENT
Start: 2024-04-14 | End: 2024-04-15 | Stop reason: HOSPADM

## 2024-04-14 RX ORDER — CARVEDILOL 3.12 MG/1
6.25 TABLET ORAL 2 TIMES DAILY
Status: DISCONTINUED | OUTPATIENT
Start: 2024-04-14 | End: 2024-04-15

## 2024-04-14 RX ORDER — NAPROXEN SODIUM 220 MG/1
81 TABLET, FILM COATED ORAL DAILY
Status: DISCONTINUED | OUTPATIENT
Start: 2024-04-14 | End: 2024-04-15 | Stop reason: HOSPADM

## 2024-04-14 RX ORDER — ONDANSETRON HYDROCHLORIDE 2 MG/ML
4 INJECTION, SOLUTION INTRAVENOUS EVERY 6 HOURS PRN
Status: DISCONTINUED | OUTPATIENT
Start: 2024-04-14 | End: 2024-04-15 | Stop reason: HOSPADM

## 2024-04-14 RX ORDER — LACTULOSE 10 G/15ML
20 SOLUTION ORAL 3 TIMES DAILY PRN
Status: DISCONTINUED | OUTPATIENT
Start: 2024-04-14 | End: 2024-04-15 | Stop reason: HOSPADM

## 2024-04-14 RX ORDER — PANTOPRAZOLE SODIUM 40 MG/1
40 TABLET, DELAYED RELEASE ORAL DAILY
Status: DISCONTINUED | OUTPATIENT
Start: 2024-04-14 | End: 2024-04-15 | Stop reason: HOSPADM

## 2024-04-14 RX ORDER — METOPROLOL TARTRATE 25 MG/1
25 TABLET, FILM COATED ORAL 2 TIMES DAILY
Status: DISCONTINUED | OUTPATIENT
Start: 2024-04-14 | End: 2024-04-14

## 2024-04-14 RX ORDER — GLUCOSAM/CHONDRO/HERB 149/HYAL 750-100 MG
1 TABLET ORAL DAILY
Status: DISCONTINUED | OUTPATIENT
Start: 2024-04-14 | End: 2024-04-15 | Stop reason: HOSPADM

## 2024-04-14 RX ORDER — LACTULOSE 10 G/15ML
20 SOLUTION ORAL 3 TIMES DAILY PRN
Status: DISCONTINUED | OUTPATIENT
Start: 2024-04-14 | End: 2024-04-14

## 2024-04-14 RX ORDER — NITROGLYCERIN 0.4 MG/1
0.4 TABLET SUBLINGUAL EVERY 5 MIN PRN
Status: DISCONTINUED | OUTPATIENT
Start: 2024-04-14 | End: 2024-04-15 | Stop reason: HOSPADM

## 2024-04-14 RX ORDER — ACETAMINOPHEN 325 MG/1
650 TABLET ORAL EVERY 6 HOURS PRN
Status: DISCONTINUED | OUTPATIENT
Start: 2024-04-14 | End: 2024-04-15 | Stop reason: HOSPADM

## 2024-04-14 RX ADMIN — METOPROLOL TARTRATE 25 MG: 25 TABLET, FILM COATED ORAL at 08:04

## 2024-04-14 RX ADMIN — HYDRALAZINE HYDROCHLORIDE 10 MG: 20 INJECTION INTRAMUSCULAR; INTRAVENOUS at 11:04

## 2024-04-14 RX ADMIN — PANTOPRAZOLE SODIUM 40 MG: 40 TABLET, DELAYED RELEASE ORAL at 08:04

## 2024-04-14 RX ADMIN — ACETAMINOPHEN 650 MG: 325 TABLET, FILM COATED ORAL at 06:04

## 2024-04-14 RX ADMIN — OMEGA-3 FATTY ACIDS CAP 1000 MG 1 CAPSULE: 1000 CAP at 08:04

## 2024-04-14 RX ADMIN — NIFEDIPINE 60 MG: 60 TABLET, FILM COATED, EXTENDED RELEASE ORAL at 08:04

## 2024-04-14 RX ADMIN — CARVEDILOL 6.25 MG: 3.12 TABLET, FILM COATED ORAL at 02:04

## 2024-04-14 RX ADMIN — ASPIRIN 81 MG CHEWABLE TABLET 81 MG: 81 TABLET CHEWABLE at 08:04

## 2024-04-14 RX ADMIN — CARVEDILOL 6.25 MG: 3.12 TABLET, FILM COATED ORAL at 08:04

## 2024-04-14 RX ADMIN — ENOXAPARIN SODIUM 40 MG: 40 INJECTION SUBCUTANEOUS at 05:04

## 2024-04-14 NOTE — ED PROVIDER NOTES
"Encounter Date: 4/13/2024       History     Chief Complaint   Patient presents with    Chest Pain     " Chest pain for about an hour I took clonidine and it is easing off."     83-year-old female history of coronary artery disease, hypertension, chronic kidney disease was in her usual state of health until about 2 hours ago she began to have burping followed by substernal chest pressure 45 minutes ago.  Pain is not worse with exertion, lying flat or inspiration and does not radiate.  She denies nausea, vomiting, diaphoresis.  Patient says it feels like a milder version of the MI she had 5 years ago.  Patient says she took clonidine 0.1 mg at 8:30 a.m. because her blood pressure was elevated    The history is provided by the patient.     Review of patient's allergies indicates:   Allergen Reactions    Latex Swelling    Benazepril      cough  cough      Crestor [rosuvastatin]     Amoxicillin-pot clavulanate      Patient does not recall reaction type  Patient does not recall reaction type      Ciprofloxacin Rash     Other reaction(s): Bactrim  Urticaria,hives  Urticaria,hives  Urticaria,hives  Urticaria,hives      Shellfish containing products Nausea Only    Sulfamethoxazole-trimethoprim Itching and Other (See Comments)     bleeding  Bleeding (C diff colitis)       Past Medical History:   Diagnosis Date    CKD (chronic kidney disease) stage 3, GFR 30-59 ml/min     Essential (primary) hypertension      Past Surgical History:   Procedure Laterality Date    APPENDECTOMY      BLADDER SURGERY      COLONOSCOPY N/A 9/13/2023    Procedure: COLON;  Surgeon: Camilo Gordillo MD;  Location: [facility] ENDOSCOPY;  Service: Gastroenterology;  Laterality: N/A;  LATEX ALLERGY    GLAUCOMA SURGERY      HYSTERECTOMY      NECK SURGERY      SINUS SURGERY      x3    TONSILLECTOMY       No family history on file.  Social History     Tobacco Use    Smoking status: Never    Smokeless tobacco: Never   Substance Use Topics    Alcohol use: Yes "     Comment: wine yearly    Drug use: Never     Review of Systems   Constitutional:  Negative for chills, diaphoresis, fatigue and fever.   HENT:  Negative for congestion, drooling, ear discharge, ear pain, postnasal drip, rhinorrhea, sinus pressure, sinus pain, sore throat and tinnitus.    Eyes:  Negative for discharge.   Respiratory:  Negative for cough, chest tightness, shortness of breath and wheezing.    Cardiovascular:  Positive for chest pain. Negative for palpitations.   Gastrointestinal:  Negative for abdominal pain, diarrhea, nausea and vomiting.   Genitourinary:  Negative for frequency, hematuria and urgency.   Musculoskeletal:  Negative for back pain, neck pain and neck stiffness.   Skin:  Negative for rash.   Neurological:  Negative for syncope, weakness, light-headedness, numbness and headaches.   Psychiatric/Behavioral:  Negative for suicidal ideas.        Physical Exam     Initial Vitals [04/13/24 2059]   BP Pulse Resp Temp SpO2   (!) 222/89 92 20 97.8 °F (36.6 °C) 98 %      MAP       --         Physical Exam    Nursing note and vitals reviewed.  Constitutional: She appears well-developed and well-nourished.   HENT:   Mouth/Throat: Oropharynx is clear and moist.   Eyes: Conjunctivae are normal.   Neck: Neck supple.   Cardiovascular:  Regular rhythm.           Pulmonary/Chest: Breath sounds normal.   Abdominal: Abdomen is soft. Bowel sounds are normal. There is no abdominal tenderness. There is no rebound.   Musculoskeletal:         General: Normal range of motion.      Cervical back: Neck supple.     Neurological: She is alert and oriented to person, place, and time.   Skin: Skin is warm and dry.         ED Course   Procedures  Labs Reviewed   COMPREHENSIVE METABOLIC PANEL - Abnormal; Notable for the following components:       Result Value    Blood Urea Nitrogen 21.2 (*)     Creatinine 1.12 (*)     Albumin/Globulin Ratio 1.0 (*)     All other components within normal limits   CBC WITH DIFFERENTIAL -  Abnormal; Notable for the following components:    MPV 11.1 (*)     All other components within normal limits   TROPONIN I - Normal   B-TYPE NATRIURETIC PEPTIDE - Normal   CBC W/ AUTO DIFFERENTIAL    Narrative:     The following orders were created for panel order CBC auto differential.  Procedure                               Abnormality         Status                     ---------                               -----------         ------                     CBC with Differential[[id number]]       Abnormal            Final result                 Please view results for these tests on the individual orders.     EKG Readings: (Independently Interpreted)   Initial Reading: No STEMI. Rhythm: Normal Sinus Rhythm. Heart Rate: Ninety-three. Conduction: 1st Degree AV Block. ST Segments: Normal ST Segments. T Waves: Normal.       Imaging Results              X-Ray Chest PA And Lateral (Final result)  Result time 04/13/24 22:16:59      Final result by Chu Hood MD (04/13/24 22:16:59)                   Impression:      No acute cardiopulmonary process identified.      Electronically signed by: Chu Hood  Date:    04/13/2024  Time:    22:16               Narrative:    EXAMINATION:  XR CHEST PA AND LATERAL    CLINICAL HISTORY:  Chest Pain;    TECHNIQUE:  Two-view    COMPARISON:  January 23, 2016.    FINDINGS:  Cardiopericardial silhouette is within normal limits. Lungs are without dense focal or segmental consolidation, congestion, pleural effusion or pneumothorax.                                       Medications   sodium chloride 0.9% flush 10 mL (has no administration in time range)   melatonin tablet 6 mg (has no administration in time range)   aspirin tablet 325 mg (325 mg Oral Given 4/13/24 2137)   nitroGLYCERIN 2% TD oint ointment 1 inch (1 inch Topical (Top) Given 4/13/24 2137)   enoxaparin injection 90 mg (90 mg Subcutaneous Given 4/13/24 5450)     Medical Decision Making  Medical Decision Making  Problem  list/ differential diagnosis including but not limited to:  Stemi, nstemi, angina, dissection, tamponade, PE, pneumothorax, boerhhave, pericarditis, cholecystitis, pancreatitis, asthma/copd, biliary colic, costochondritis, esophageal spasm, gerd/ gastritis, panic attack, pneumonia, muscle strain, pleurisy    Patient's chronic illnesses impacting care:  CAD    Diagnostic test considered but not ordered:      My interpretations:    Radiology reports      Discussion of case with external qualified healthcare professionals:  Cardiology    Review of external notes( inpt, ems, NH, clinic):      Decision rules/scores:  Heart score    Medications reviewed:  Medications ordered in the ER:  Aspirin nitro  Discharge prescriptions:    Social variables possible impacting patient's healthcare:    Code status/discussion    Shared decision making:    Consideration for admission versus discharge:      Amount and/or Complexity of Data Reviewed  Labs: ordered. Decision-making details documented in ED Course.  Radiology: ordered.    Risk  OTC drugs.  Prescription drug management.      Additional MDM:   Heart Score:    History:          Highly suspicious.  ECG:             Normal  Age:               >65 years  Risk factors: 1-2 risk factors              ED Course as of 04/14/24 0230   Sat Apr 13, 2024 2341 Patient accepted by Alberto with [facility].  Asked that we give Lovenox [WC]   2347 BNP: 32.6 [WC]   2347 Troponin I: 0.018  Enzyme negative [WC]   2347 Patient's blood pressure improved [WC]      ED Course User Index  [WC] Colby Reina MD                             Clinical Impression:  Final diagnoses:  [R07.9] Chest pain  [R07.9] Chest pain, unspecified type (Primary)          ED Disposition Condition    Transfer to Another Facility Stable                Colby Reina MD  04/13/24 2347       Colby Reina MD  04/14/24 0230

## 2024-04-14 NOTE — H&P
[facility] - Emergency Dept    Cardiology  Consult Note    Patient Name: Nell Mock  MRN: [id number]  Admission Date: 4/13/2024  Hospital Length of Stay: 1 days  Code Status: Full Code   Attending Provider: Liza De La Cruz MD   Consulting Provider: LINDA Arana  Primary Care Physician: Tasia May MD  Principal Problem:<principal problem not specified>    Patient information was obtained from patient, past medical records, and ER records.     Subjective:     Chief Complaint/Reason for Consult:     HPI:   Ms. Mock is an 84 y/o female, followed by Dr. Hatfield, who presented to Ed with c/o onset o dyspepsia followed by substernal chest pressure x 45 minutes. No radiation or associated N/V or diaphoresis. She took a clonidine because her BP was elevated and presented to ED for evaluation. Admit /89. Troponin negative x 2. BNP 32.6. EKG SR. CXR negative for acute cardiopulmonary process. She was treated with aspirin, NTP, and lovenox and admitted to [facility]. Bps have improved overnight but remain elevated.     PMH: CAD, HTN, CKD 3, venous stasis, Hepatitis, MI, CLL, diverticulitis, R BBB  PSH: LHC, R GSV RFA, colon resection, cholecystectomy, hysterectomy, bladder repair surgery, sinus surgery  Family History: mother- kidney disease, T2DM; father- MI; Brother- MI, leukemia reason for death  Social History: non smoker; denies ETOH or illicit drug use    Previous Cardiac Diagnostics:   [facility] 04.29.22:   LM: patent  Ramus patent  Lcx patent  RCA patent proximal to mid stent. No other disease present.   EF 65%.   LV pressure 163/11, LVEDP 21 and aortic pressure is   169/66 mmHg.    [facility] 05.25.20:  1. The study quality is average.   2. The left ventricle is normal in size. Global left ventricular systolic function is normal. The left ventricular ejection fraction is 70%.   3. Mild calcification of the aortic valve is noted with adequate cuspal excursion.  4. Trace aortic, trace  tricuspid, mild (1+) pulmonic, and mild (1+) mitral regurgitation.     [facility] 06.03.19:  1.  Left main artery: Normal left main with separate ostium of LAD,   circumflex and ramus intermediate artery   2.  Left anterior descending artery: 50 to 60% ostial lesion   3.  Circumflex artery: Within normal limits   4.  Right coronary: 80% proximal RCA lesion   Left ventriculogram showed normal left ventricular function with ejection   fraction of 50%     Result of intervention   80% proximal RCA lesion with haziness dilated to 0% residual with 3.0 x 16   mm drug-eluting stent with diameter up to 3.29 mm.       Past Medical History:   Diagnosis Date    CKD (chronic kidney disease) stage 3, GFR 30-59 ml/min     Essential (primary) hypertension      Past Surgical History:   Procedure Laterality Date    APPENDECTOMY      BLADDER SURGERY      COLONOSCOPY N/A 9/13/2023    Procedure: COLON;  Surgeon: Camilo Gordillo MD;  Location: [facility] ENDOSCOPY;  Service: Gastroenterology;  Laterality: N/A;  LATEX ALLERGY    GLAUCOMA SURGERY      HYSTERECTOMY      NECK SURGERY      SINUS SURGERY      x3    TONSILLECTOMY       Review of patient's allergies indicates:   Allergen Reactions    Latex Swelling    Benazepril      cough  cough      Crestor [rosuvastatin]     Amoxicillin-pot clavulanate      Patient does not recall reaction type  Patient does not recall reaction type      Ciprofloxacin Rash     Other reaction(s): Bactrim  Urticaria,hives  Urticaria,hives  Urticaria,hives  Urticaria,hives      Shellfish containing products Nausea Only    Sulfamethoxazole-trimethoprim Itching and Other (See Comments)     bleeding  Bleeding (C diff colitis)       Current Facility-Administered Medications   Medication Dose Route Frequency Provider Last Rate Last Admin    acetaminophen tablet 650 mg  650 mg Oral Q6H PRN Ailyn Astudillo MD        aspirin chewable tablet 81 mg  81 mg Oral Daily Ailyn Astudillo MD   81 mg at 04/14/24 0856     enoxaparin injection 40 mg  40 mg Subcutaneous Q24H (prophylaxis, 1700) Ailyn Astudillo MD        hydrALAZINE injection 10 mg  10 mg Intravenous Q6H PRN Ailyn Astudillo MD   10 mg at 04/14/24 1101    lactulose 10 gram/15 ml solution 20 g  20 g Oral TID PRN Liza De La Cruz MD        melatonin tablet 6 mg  6 mg Oral Nightly PRN Colby Reina MD        metoprolol tartrate (LOPRESSOR) tablet 25 mg  25 mg Oral BID Ailyn Astudillo MD   25 mg at 04/14/24 0822    NIFEdipine 24 hr tablet 60 mg  60 mg Oral Daily Ailyn Astudillo MD   60 mg at 04/14/24 0822    nitroGLYCERIN SL tablet 0.4 mg  0.4 mg Sublingual Q5 Min PRN Ailyn Astudillo MD        omega 3-dha-epa-fish oil 1,000 mg (120 mg-180 mg) Cap 1 capsule  1 capsule Oral Daily Ailyn Astudillo MD   1 capsule at 04/14/24 0822    ondansetron injection 4 mg  4 mg Intravenous Q6H PRN Ailyn Astudillo MD        pantoprazole EC tablet 40 mg  40 mg Oral Daily Ailyn Astudillo MD   40 mg at 04/14/24 0822    sodium chloride 0.9% flush 10 mL  10 mL Intravenous PRN Colby Reina MD         Current Outpatient Medications   Medication Sig Dispense Refill    lactulose (CHRONULAC) 20 gram/30 mL Soln Take 30 mLs (20 g total) by mouth 3 (three) times daily as needed (constipation). (Patient not taking: Reported on 4/9/2024) 473 mL 1    meloxicam (MOBIC) 15 MG tablet Take 1 tablet (15 mg total) by mouth once daily. 30 tablet 2    NIFEdipine (ADALAT CC) 60 MG TbSR Take 60 mg by mouth once daily.      omega-3 fatty acids/fish oil (FISH OIL-OMEGA-3 FATTY ACIDS) 300-1,000 mg capsule Take by mouth once daily.      ondansetron (ZOFRAN-ODT) 4 MG TbDL Take 1 tablet (4 mg total) by mouth every 6 (six) hours as needed. (Patient not taking: Reported on 4/9/2024) 15 tablet 0    pantoprazole (PROTONIX) 40 MG tablet Take 1 tablet (40 mg total) by mouth once daily. 30 tablet 11     Family History    None       Tobacco Use    Smoking status: Never    Smokeless tobacco:  Never   Substance and Sexual Activity    Alcohol use: Yes     Comment: wine yearly    Drug use: Never    Sexual activity: Not on file       Review of Systems   Constitutional: Negative.    Respiratory:  Negative for cough and shortness of breath.    Cardiovascular:  Negative for chest pain and palpitations.   Gastrointestinal: Negative.    Genitourinary: Negative.    Neurological: Negative.    Psychiatric/Behavioral: Negative.       Objective:     Vital Signs (Most Recent):  Temp: 98.5 °F (36.9 °C) (04/14/24 0735)  Pulse: (!) 50 (04/14/24 0902)  Resp: 15 (04/14/24 0902)  BP: (!) 170/72 (04/14/24 0902)  SpO2: 97 % (04/14/24 0902) Vital Signs (24h Range):  Temp:  [97.8 °F (36.6 °C)-98.6 °F (37 °C)] 98.5 °F (36.9 °C)  Pulse:  [50-93] 50  Resp:  [13-20] 15  SpO2:  [92 %-98 %] 97 %  BP: (133-222)/(51-94) 170/72   Weight: 89.8 kg (197 lb 15.6 oz)  Body mass index is 37.41 kg/m².  SpO2: 97 %     No intake or output data in the 24 hours ending 04/14/24 1133  Lines/Drains/Airways       Peripheral Intravenous Line  Duration                  Peripheral IV - Single Lumen 04/13/24 2103 20 G Left;Posterior Hand <1 day                  Significant Labs:  Recent Results (from the past 72 hour(s))   BNP    Collection Time: 04/13/24  9:27 PM   Result Value Ref Range    Natriuretic Peptide 32.6 <=100.0 pg/mL   CBC with Differential    Collection Time: 04/13/24  9:27 PM   Result Value Ref Range    WBC 5.64 4.50 - 11.50 x10(3)/mcL    RBC 4.74 4.20 - 5.40 x10(6)/mcL    Hgb 13.8 12.0 - 16.0 g/dL    Hct 41.6 37.0 - 47.0 %    MCV 87.8 80.0 - 94.0 fL    MCH 29.1 27.0 - 31.0 pg    MCHC 33.2 33.0 - 36.0 g/dL    RDW 13.8 11.5 - 17.0 %    Platelet 161 130 - 400 x10(3)/mcL    MPV 11.1 (H) 7.4 - 10.4 fL    Neut % 51.4 %    Lymph % 39.7 %    Mono % 5.3 %    Eos % 2.5 %    Basophil % 0.9 %    Lymph # 2.24 0.6 - 4.6 x10(3)/mcL    Neut # 2.90 2.1 - 9.2 x10(3)/mcL    Mono # 0.30 0.1 - 1.3 x10(3)/mcL    Eos # 0.14 0 - 0.9 x10(3)/mcL    Baso # 0.05 <=0.2  x10(3)/mcL    IG# 0.01 0 - 0.04 x10(3)/mcL    IG% 0.2 %    NRBC% 0.0 %   Comprehensive metabolic panel    Collection Time: 04/13/24  9:45 PM   Result Value Ref Range    Sodium Level 144 136 - 145 mmol/L    Potassium Level 3.6 3.5 - 5.1 mmol/L    Chloride 106 98 - 107 mmol/L    Carbon Dioxide 27 23 - 31 mmol/L    Glucose Level 100 82 - 115 mg/dL    Blood Urea Nitrogen 21.2 (H) 9.8 - 20.1 mg/dL    Creatinine 1.12 (H) 0.55 - 1.02 mg/dL    Calcium Level Total 9.3 8.4 - 10.2 mg/dL    Protein Total 6.9 5.8 - 7.6 gm/dL    Albumin Level 3.5 3.4 - 4.8 g/dL    Globulin 3.4 2.4 - 3.5 gm/dL    Albumin/Globulin Ratio 1.0 (L) 1.1 - 2.0 ratio    Bilirubin Total 0.3 <=1.5 mg/dL    Alkaline Phosphatase 143 40 - 150 unit/L    Alanine Aminotransferase 20 0 - 55 unit/L    Aspartate Aminotransferase 34 5 - 34 unit/L    eGFR 50 mls/min/1.73/m2   Troponin I    Collection Time: 04/13/24  9:45 PM   Result Value Ref Range    Troponin-I 0.018 0.000 - 0.045 ng/mL   Troponin I    Collection Time: 04/14/24  4:40 AM   Result Value Ref Range    Troponin-I 0.028 0.000 - 0.045 ng/mL   Lipid panel    Collection Time: 04/14/24  4:40 AM   Result Value Ref Range    Cholesterol Total 183 <=200 mg/dL    HDL Cholesterol 70 (H) 35 - 60 mg/dL    Triglyceride 102 37 - 140 mg/dL    Cholesterol/HDL Ratio 3 0 - 5    Very Low Density Lipoprotein 20     LDL Cholesterol 93.00 50.00 - 140.00 mg/dL     Significant Imaging:  Imaging Results              X-Ray Chest PA And Lateral (Final result)  Result time 04/13/24 22:16:59      Final result by Chu Hood MD (04/13/24 22:16:59)                   Impression:      No acute cardiopulmonary process identified.      Electronically signed by: Chu Hood  Date:    04/13/2024  Time:    22:16               Narrative:    EXAMINATION:  XR CHEST PA AND LATERAL    CLINICAL HISTORY:  Chest Pain;    TECHNIQUE:  Two-view    COMPARISON:  January 23, 2016.    FINDINGS:  Cardiopericardial silhouette is within normal limits.  Lungs are without dense focal or segmental consolidation, congestion, pleural effusion or pneumothorax.                                    EKG:         Physical Exam  HENT:      Mouth/Throat:      Mouth: Mucous membranes are moist.   Cardiovascular:      Rate and Rhythm: Normal rate and regular rhythm.      Pulses: Normal pulses.      Heart sounds: Normal heart sounds.   Pulmonary:      Effort: Pulmonary effort is normal.      Breath sounds: Normal breath sounds.   Abdominal:      Palpations: Abdomen is soft.   Musculoskeletal:         General: Normal range of motion.   Skin:     General: Skin is warm.      Capillary Refill: Capillary refill takes less than 2 seconds.   Neurological:      General: No focal deficit present.      Mental Status: She is alert.   Psychiatric:         Mood and Affect: Mood normal.       Home Medications:   No current facility-administered medications on file prior to encounter.     Current Outpatient Medications on File Prior to Encounter   Medication Sig Dispense Refill    lactulose (CHRONULAC) 20 gram/30 mL Soln Take 30 mLs (20 g total) by mouth 3 (three) times daily as needed (constipation). (Patient not taking: Reported on 4/9/2024) 473 mL 1    meloxicam (MOBIC) 15 MG tablet Take 1 tablet (15 mg total) by mouth once daily. 30 tablet 2    NIFEdipine (ADALAT CC) 60 MG TbSR Take 60 mg by mouth once daily.      omega-3 fatty acids/fish oil (FISH OIL-OMEGA-3 FATTY ACIDS) 300-1,000 mg capsule Take by mouth once daily.      ondansetron (ZOFRAN-ODT) 4 MG TbDL Take 1 tablet (4 mg total) by mouth every 6 (six) hours as needed. (Patient not taking: Reported on 4/9/2024) 15 tablet 0    pantoprazole (PROTONIX) 40 MG tablet Take 1 tablet (40 mg total) by mouth once daily. 30 tablet 11     Current Inpatient Medications:    Current Facility-Administered Medications:     acetaminophen tablet 650 mg, 650 mg, Oral, Q6H PRN, Ailyn Astudillo MD    aspirin chewable tablet 81 mg, 81 mg, Oral, Daily,  Ailyn Astudillo MD, 81 mg at 04/14/24 0822    enoxaparin injection 40 mg, 40 mg, Subcutaneous, Q24H (prophylaxis, 1700), Ailyn Astudillo MD    hydrALAZINE injection 10 mg, 10 mg, Intravenous, Q6H PRN, Ailyn Astudillo MD, 10 mg at 04/14/24 1101    lactulose 10 gram/15 ml solution 20 g, 20 g, Oral, TID PRN, Liza De La Cruz MD    melatonin tablet 6 mg, 6 mg, Oral, Nightly PRN, Colby Reina MD    metoprolol tartrate (LOPRESSOR) tablet 25 mg, 25 mg, Oral, BID, Ailyn Astudillo MD, 25 mg at 04/14/24 0822    NIFEdipine 24 hr tablet 60 mg, 60 mg, Oral, Daily, Ailyn Astudillo MD, 60 mg at 04/14/24 0822    nitroGLYCERIN SL tablet 0.4 mg, 0.4 mg, Sublingual, Q5 Min PRN, Ailyn Astudillo MD    omega 3-dha-epa-fish oil 1,000 mg (120 mg-180 mg) Cap 1 capsule, 1 capsule, Oral, Daily, Ailyn Astudillo MD, 1 capsule at 04/14/24 0822    ondansetron injection 4 mg, 4 mg, Intravenous, Q6H PRN, Ailyn Astudillo MD    pantoprazole EC tablet 40 mg, 40 mg, Oral, Daily, Ailyn Astudillo MD, 40 mg at 04/14/24 0822    sodium chloride 0.9% flush 10 mL, 10 mL, Intravenous, PRN, Colby Reina MD    Current Outpatient Medications:     lactulose (CHRONULAC) 20 gram/30 mL Soln, Take 30 mLs (20 g total) by mouth 3 (three) times daily as needed (constipation). (Patient not taking: Reported on 4/9/2024), Disp: 473 mL, Rfl: 1    meloxicam (MOBIC) 15 MG tablet, Take 1 tablet (15 mg total) by mouth once daily., Disp: 30 tablet, Rfl: 2    NIFEdipine (ADALAT CC) 60 MG TbSR, Take 60 mg by mouth once daily., Disp: , Rfl:     omega-3 fatty acids/fish oil (FISH OIL-OMEGA-3 FATTY ACIDS) 300-1,000 mg capsule, Take by mouth once daily., Disp: , Rfl:     ondansetron (ZOFRAN-ODT) 4 MG TbDL, Take 1 tablet (4 mg total) by mouth every 6 (six) hours as needed. (Patient not taking: Reported on 4/9/2024), Disp: 15 tablet, Rfl: 0    pantoprazole (PROTONIX) 40 MG tablet, Take 1 tablet (40 mg total) by mouth once daily., Disp: 30 tablet,  Rfl: 11  VTE Risk Mitigation (From admission, onward)           Ordered     enoxaparin injection 40 mg  Every 24 hours         04/14/24 0452                  Assessment:   Chest pain  --troponin negative x 2  Native CAD  --[facility] 04/29/22: patent stent RCA. All other vessels free of any disease  Hypertensive Urgency  --improving  HLD  CLL  Hepatitis  Venous stasis  Hx RBBB    Plan:   Obtain echo for comparison  Continue nifedipine 60 mg daily. Change Metoprolol tartrate to coreg 6.25 mg bid.   Continue aspirin.   Trend enzymes   Obtain A1c and lipid panel        Thank you for your consult.     Denia Mcnair, LINDA  Cardiology  [facility] - Emergency Dept  04/14/2024     I agree with the findings of the complexity of problems addressed and take responsibility for the plan's risks and complications. I approved the plan documented by Denia Mcnair NP.  Patient has hypertensive urgency.  Adjusting medications blood pressure is improving patient is chest pain-free troponins so far negative x2

## 2024-04-14 NOTE — H&P
Chief Complaint; chest pain last night    HPI:   Patient is a 83 y.o.obese female with a medical history of significant medical history of coronary artery disease status post coronary stent placement about 5 years ago, hypertension and chronic kidney disease stage IIIA presents to the ER on account of retrosternal chest pain last night  Patient has been at her usual state of health until last night when she suddenly developed retrosternal chest pain, sharp/dull, about 5/10 in intensity, radiating to the left side of the chest with no aggravating but relieved with a dose of 0.1 mg clonidine tablets.  She denies any shortness of breath.  No diaphoresis.  No nausea or vomiting.  No fever.  She decided to come to the ER for further evaluation.    At the ER, EKG shows sinus rhythm with no acute ST wave changes.  Cardiac enzymes not remarkable.  Chest x-ray shows no acute findings.  Patient has been transferred to the main [place] for further ischemic workup.  She is currently asymptomatic.    PCP Tasia May MD MD        Past Medical History:   Diagnosis Date    CKD (chronic kidney disease) stage 3, GFR 30-59 ml/min     Essential (primary) hypertension         Past Surgical History:   Procedure Laterality Date    APPENDECTOMY      BLADDER SURGERY      COLONOSCOPY N/A 9/13/2023    Procedure: COLON;  Surgeon: Camilo Gordillo MD;  [facility]: [facility] ENDOSCOPY;  Service: Gastroenterology;  Laterality: N/A;  LATEX ALLERGY    GLAUCOMA SURGERY      HYSTERECTOMY      NECK SURGERY      SINUS SURGERY      x3    TONSILLECTOMY          (Not in a hospital admission)  Please see medication reconciliation chart    Review of patient's allergies indicates:   Allergen Reactions    Latex Swelling    Benazepril      cough  cough      Crestor [rosuvastatin]     Amoxicillin-pot clavulanate      Patient does not recall reaction type  Patient does not recall reaction type      Ciprofloxacin Rash     Other reaction(s):  "Bactrim  Urticaria,hives  Urticaria,hives  Urticaria,hives  Urticaria,hives      Shellfish containing products Nausea Only    Sulfamethoxazole-trimethoprim Itching and Other (See Comments)     bleeding  Bleeding (C diff colitis)          Social History     Tobacco Use    Smoking status: Never    Smokeless tobacco: Never   Substance Use Topics    Alcohol use: Yes     Comment: wine yearly        No family history on file.  Significant for medical history of hypertension    Review of Systems  Review of Systems   Constitutional: Negative for fever.   HEENT: Negative for drooling, ear pain, facial swelling and nosebleeds.    Eyes: Negative for discharge and visual disturbance.   Respiratory: Negative for cough, negative shortness of breath.    Cardiovascular: negative for chest pain or SOB  Gastrointestinal: Negative for anal bleeding and rectal pain. Negative Nausea or Vomiting.  Genitourinary: Negative for decreased urine volume and dysuria  Musculoskeletal: Negative for neck pain.   Skin: Negative for rash.   Neurological: negative for numbness, negative for weakness,negative for seizures and facial asymmetry.              Objective:     No intake/output data recorded.    BP (!) 137/51   Pulse 70   Temp 97.8 °F (36.6 °C) (Oral)   Resp 15   Ht 5' 1" (1.549 m)   Wt 89.8 kg (198 lb)   SpO2 96%   Breastfeeding No   BMI 37.41 kg/m²     General Appearance:    Awake, alert, not in acute distress   HEENT:   atraumatic, PERRL, EOM intact, conjuctiva pink, sclera anicteric, oropharynx moist, no lesions noted   Neck:    Supple, no JVD, no carotid bruits, no lymphadenopathy or thyromegaly noted       Pulmonary:   Clear to auscultation bilaterally, reduced breath sounds bileterally.   Cardiovascular:    Regular rate and rhythm, S1 S2 normal   Abdomen:     Soft, non-tender,nondistended, bowel sounds active all four quadrants, no masses, no organomegaly   Extremities:  no edema, no cyanosis or clubbing noted       Skin:   No " "bruises noted.       Neurologic: Alert, awake, oriented x 3, moves all extremities.                 Data Review :   Labs:    CBC:   Lab Results   Component Value Date    WBC 5.64 04/13/2024    WBC 5.2 10/24/2023    RBC 4.74 04/13/2024    HGB 13.8 04/13/2024    HGB 12.9 10/24/2023    HCT 41.6 04/13/2024    HCT 40.6 10/24/2023     04/13/2024     10/24/2023     CMP:   Lab Results   Component Value Date     04/13/2024     04/30/2022    K 3.6 04/13/2024    K 3.5 04/30/2022     04/30/2022    CO2 27 04/13/2024    CO2 24 04/30/2022    BUN 21.2 (H) 04/13/2024    BUN 20 07/27/2023    CREATININE 1.12 (H) 04/13/2024    CREATININE 1.35 (H) 07/27/2023    CALCIUM 9.3 04/13/2024    CALCIUM 9.8 07/27/2023    ALKPHOS 143 04/13/2024    AST 34 04/13/2024    ALT 20 04/13/2024    ALBUMIN 3.5 04/13/2024    BILITOT 0.3 04/13/2024     Cardiac markers:   Lab Results   Component Value Date    BNP 32.6 04/13/2024       Radiology:  Micro:  No components found for: "BLOODCX", "SPUTUMCX", "URINECX"    Radiology:  @[facility]4@        Assessment & Plan:   1. Chest pain; this may be related to ischemic heart disease, given the patient's underlying history of coronary artery disease status post coronary stent placement about 5 years ago.  Was restart patient back on aspirin, metoprolol and Lipitor.  To continue nitroglycerin p.r.n. for chest pain.  Patient has been transferred to the main [place] for further ischemic workup by the cardiologist including possible stress test and 2D echocardiogram.    2. Hypertensive urgency; improved, continue nifedipine and metoprolol.  Monitor blood pressure closely.    3. History of coronary artery disease status post coronary stent placement; continue above-stated medications.    4. Chronic kidney disease stage IIIA; monitor kidney function and avoid nephrotoxic agents.    5. Obesity; low-calorie diet has been advised.    6. Maintain the patient on DVT prophylaxis by way of " Lovenox    Disposition; place patient on observation status for now.  This note was completed using voice recognition software and transcription errors may occur.    This Encounter was via Telemedicine (Both audio and visual ) and from [place], [place].  Patient was located in [place].    Evaluation  of the patient was done alongside the patient's nursing staff       Ailyn Astudillo  4/14/2024  4:34 AM

## 2024-04-15 VITALS
HEART RATE: 63 BPM | HEIGHT: 61 IN | SYSTOLIC BLOOD PRESSURE: 145 MMHG | WEIGHT: 204.13 LBS | TEMPERATURE: 99 F | BODY MASS INDEX: 38.54 KG/M2 | OXYGEN SATURATION: 96 % | DIASTOLIC BLOOD PRESSURE: 78 MMHG | RESPIRATION RATE: 18 BRPM

## 2024-04-15 PROBLEM — R07.9 CHEST PAIN: Status: ACTIVE | Noted: 2024-04-15

## 2024-04-15 LAB
ANION GAP SERPL CALC-SCNC: 7 MEQ/L
BASOPHILS # BLD AUTO: 0.06 X10(3)/MCL
BASOPHILS NFR BLD AUTO: 1.2 %
BUN SERPL-MCNC: 13 MG/DL (ref 9.8–20.1)
CALCIUM SERPL-MCNC: 9.2 MG/DL (ref 8.4–10.2)
CHLORIDE SERPL-SCNC: 108 MMOL/L (ref 98–107)
CO2 SERPL-SCNC: 28 MMOL/L (ref 23–31)
CREAT SERPL-MCNC: 1.05 MG/DL (ref 0.55–1.02)
CREAT/UREA NIT SERPL: 12
EOSINOPHIL # BLD AUTO: 0.11 X10(3)/MCL (ref 0–0.9)
EOSINOPHIL NFR BLD AUTO: 2.2 %
ERYTHROCYTE [DISTWIDTH] IN BLOOD BY AUTOMATED COUNT: 13.9 % (ref 11.5–17)
GFR SERPLBLD CREATININE-BSD FMLA CKD-EPI: 53 MLS/MIN/1.73/M2
GLUCOSE SERPL-MCNC: 89 MG/DL (ref 82–115)
HCT VFR BLD AUTO: 39.1 % (ref 37–47)
HGB BLD-MCNC: 12.9 G/DL (ref 12–16)
IMM GRANULOCYTES # BLD AUTO: 0.01 X10(3)/MCL (ref 0–0.04)
IMM GRANULOCYTES NFR BLD AUTO: 0.2 %
LYMPHOCYTES # BLD AUTO: 1.37 X10(3)/MCL (ref 0.6–4.6)
LYMPHOCYTES NFR BLD AUTO: 27.2 %
MCH RBC QN AUTO: 29.1 PG (ref 27–31)
MCHC RBC AUTO-ENTMCNC: 33 G/DL (ref 33–36)
MCV RBC AUTO: 88.1 FL (ref 80–94)
MONOCYTES # BLD AUTO: 0.33 X10(3)/MCL (ref 0.1–1.3)
MONOCYTES NFR BLD AUTO: 6.6 %
NEUTROPHILS # BLD AUTO: 3.15 X10(3)/MCL (ref 2.1–9.2)
NEUTROPHILS NFR BLD AUTO: 62.6 %
NRBC BLD AUTO-RTO: 0 %
PLATELET # BLD AUTO: 147 X10(3)/MCL (ref 130–400)
PMV BLD AUTO: 11 FL (ref 7.4–10.4)
POTASSIUM SERPL-SCNC: 4 MMOL/L (ref 3.5–5.1)
RBC # BLD AUTO: 4.44 X10(6)/MCL (ref 4.2–5.4)
SODIUM SERPL-SCNC: 143 MMOL/L (ref 136–145)
TROPONIN I SERPL-MCNC: <0.01 NG/ML (ref 0–0.04)
WBC # SPEC AUTO: 5.03 X10(3)/MCL (ref 4.5–11.5)

## 2024-04-15 PROCEDURE — 80048 BASIC METABOLIC PNL TOTAL CA: CPT | Performed by: NURSE PRACTITIONER

## 2024-04-15 PROCEDURE — 84484 ASSAY OF TROPONIN QUANT: CPT | Performed by: NURSE PRACTITIONER

## 2024-04-15 PROCEDURE — 85025 COMPLETE CBC W/AUTO DIFF WBC: CPT | Performed by: NURSE PRACTITIONER

## 2024-04-15 PROCEDURE — 25000003 PHARM REV CODE 250

## 2024-04-15 PROCEDURE — 25000003 PHARM REV CODE 250: Performed by: NURSE PRACTITIONER

## 2024-04-15 PROCEDURE — 25000003 PHARM REV CODE 250: Performed by: INTERNAL MEDICINE

## 2024-04-15 RX ORDER — CARVEDILOL 12.5 MG/1
12.5 TABLET ORAL 2 TIMES DAILY
Status: DISCONTINUED | OUTPATIENT
Start: 2024-04-15 | End: 2024-04-15 | Stop reason: HOSPADM

## 2024-04-15 RX ORDER — CARVEDILOL 12.5 MG/1
12.5 TABLET ORAL 2 TIMES DAILY
Qty: 60 TABLET | Refills: 3 | Status: SHIPPED | OUTPATIENT
Start: 2024-04-15 | End: 2025-04-15

## 2024-04-15 RX ORDER — NAPROXEN SODIUM 220 MG/1
81 TABLET, FILM COATED ORAL DAILY
Qty: 90 TABLET | Refills: 3 | Status: SHIPPED | OUTPATIENT
Start: 2024-04-16 | End: 2025-04-16

## 2024-04-15 RX ORDER — CARVEDILOL 3.12 MG/1
6.25 TABLET ORAL ONCE
Status: COMPLETED | OUTPATIENT
Start: 2024-04-15 | End: 2024-04-15

## 2024-04-15 RX ADMIN — PANTOPRAZOLE SODIUM 40 MG: 40 TABLET, DELAYED RELEASE ORAL at 09:04

## 2024-04-15 RX ADMIN — OMEGA-3 FATTY ACIDS CAP 1000 MG 1 CAPSULE: 1000 CAP at 09:04

## 2024-04-15 RX ADMIN — ASPIRIN 81 MG CHEWABLE TABLET 81 MG: 81 TABLET CHEWABLE at 09:04

## 2024-04-15 RX ADMIN — CARVEDILOL 6.25 MG: 3.12 TABLET, FILM COATED ORAL at 11:04

## 2024-04-15 RX ADMIN — CARVEDILOL 6.25 MG: 3.12 TABLET, FILM COATED ORAL at 09:04

## 2024-04-15 RX ADMIN — NIFEDIPINE 60 MG: 60 TABLET, FILM COATED, EXTENDED RELEASE ORAL at 09:04

## 2024-04-15 NOTE — PLAN OF CARE
04/15/24 1042   Discharge Assessment   Assessment Type Discharge Planning Assessment   Confirmed/corrected address, phone number and insurance Yes   Confirmed Demographics Correct on Facesheet   Source of Information patient   Communicated WALT with patient/caregiver Date not available/Unable to determine   Reason For Admission Chief Complaint; chest pain last night; 83 y.o.obese female with a medical history of significant medical history of coronary artery disease status post coronary stent placement about 5 years ago, hypertension and chronic kidney disease stage IIIA presents to the ER on account of retrosternal chest pain last night  Patient has been at her usual state of health until last night when she suddenly developed retrosternal chest pain, sharp/dull, about 5/10 in intensity, radiating to the left side of the chest with no aggravating but relieved with a dose of 0.1 mg clonidine tablets.  She denies any shortness of breath.   People in Home child(mario), adult  (The patient lives with her Son: Axel Mock: [phone])   Facility Arrived From: Private residence.   Do you expect to return to your current living situation? Yes   Do you have help at home or someone to help you manage your care at home? Yes   Who are your caregiver(s) and their phone number(s)? The patient lives with her Son: Axel Mock: [phone]   Prior to hospitilization cognitive status: Alert/Oriented   Current cognitive status: Alert/Oriented   Walking or Climbing Stairs Difficulty no   Dressing/Bathing Difficulty no   Home Accessibility   (The patient's home is built on a slab.)   Home Layout Able to live on 1st floor   Equipment Currently Used at Home grab bar   Readmission within 30 days? No   Patient currently being followed by outpatient case management? No   Do you currently have service(s) that help you manage your care at home? No   Do you take prescription medications? Yes   Do you have prescription coverage? Yes    Coverage Payor: MEDICARE - MEDICARE PART A & B -   Do you have any problems affording any of your prescribed medications? No   Is the patient taking medications as prescribed? yes   Who is going to help you get home at discharge? The patient lives with her Son: Axel Mock: [phone] and Daughter: Kathryn Mock: [phone]   How do you get to doctors appointments? car, drives self   Are you on dialysis? No   Do you take coumadin? No   Discharge Plan A Home with family   Discharge Plan B Home with family   DME Needed Upon Discharge  none   Discharge Plan discussed with: Patient   Transition of Care Barriers None   Social Connections   In a typical week, how many times do you talk on the phone with family, friends, or neighbors? Three   How often do you get together with friends or relatives? Three times   How often do you attend Yazidi or Buddhist services? 1 to 4   Do you belong to any clubs or organizations such as Yazidi groups, unions, fraternal or athletic groups, or school groups? Yes  (The patient is a parishioner of: [facility])   How often do you attend meetings of the clubs or organizations you belong to? 1 to 4   Are you , , , , never , or living with a partner?    Alcohol Use   Q1: How often do you have a drink containing alcohol? Never   Q2: How many drinks containing alcohol do you have on a typical day when you are drinking? None   Q3: How often do you have six or more drinks on one occasion? Never   OTHER   Name(s) of People in Home The patient lives with her Son: Axel Mock: [phone] and Daughter: Kathryn Mock: [phone] is actively involved in her Mom's care.

## 2024-04-15 NOTE — ED NOTES
Patient resting on hospital bed w/ eyes closed. RR nonlabored, NAD. Patient wakes to voice. Patient denies needs. Call light in reach.

## 2024-04-15 NOTE — DISCHARGE SUMMARY
"[facility] Medical Telemetry  Cardiology  Discharge Summary      Patient Name: Nell Mock  MRN: [id number]  Admission Date: 4/13/2024  Hospital Length of Stay: 2 days  Discharge Date and Time:  04/15/2024 1:30 PM  Attending Physician: Liza De La Cruz MD  Discharging Provider: LINDA Partida  Primary Care Physician: Tasia May MD    HPI: Ms. Mock is an 82 y/o female, followed by Dr. Hatfield, who presented to Ed with c/o onset o dyspepsia followed by substernal chest pressure x 45 minutes. No radiation or associated N/V or diaphoresis. She took a clonidine because her BP was elevated and presented to ED for evaluation. Admit /89. Troponin negative x 2. BNP 32.6. EKG SR. CXR negative for acute cardiopulmonary process. She was treated with aspirin, NTP, and lovenox and admitted to [facility]. Bps have improved overnight but remain elevated.     Hospital course:   4.15.24: NAD. Denies CP, SOB, or palps. SR on tele. Remains hypertensive this but improved. "I feel better."     * No surgery found *     Final Active Diagnoses:    Diagnosis Date Noted POA    PRINCIPAL PROBLEM:  Chest pain [R07.9] 04/15/2024 Yes      Problems Resolved During this Admission:       Discharged Condition: stable    Review of Systems   Cardiovascular:  Negative for chest pain and palpitations.   Respiratory:  Negative for shortness of breath.        Physical Exam  HENT:      Head: Normocephalic.      Nose: Nose normal.      Mouth/Throat:      Mouth: Mucous membranes are moist.   Eyes:      Extraocular Movements: Extraocular movements intact.   Cardiovascular:      Rate and Rhythm: Normal rate and regular rhythm.      Pulses: Normal pulses.      Heart sounds: Normal heart sounds.   Pulmonary:      Effort: Pulmonary effort is normal.      Breath sounds: Normal breath sounds.   Abdominal:      Palpations: Abdomen is soft.   Skin:     General: Skin is warm and dry.   Neurological:      Mental Status: She is " alert and oriented to person, place, and time.   Psychiatric:         Behavior: Behavior normal.         Disposition: Home or Self Care    Follow Up:   Follow-up Information       Wil Hatfield MD Follow up.    Specialty: Cardiology  Why: 1-2 weeks  Contact information:  [address] [place] [id number]  [phone]                           Patient Instructions:      Diet Cardiac     Notify your health care provider if you experience any of the following:  temperature >100.4     Notify your health care provider if you experience any of the following:  difficulty breathing or increased cough     Notify your health care provider if you experience any of the following:  severe persistent headache     Activity as tolerated     Medications:  Reconciled Home Medications:      Medication List        START taking these medications      aspirin 81 MG Chew  Take 1 tablet (81 mg total) by mouth once daily.  Start taking on: April 16, 2024     carvediloL 12.5 MG tablet  Commonly known as: COREG  Take 1 tablet (12.5 mg total) by mouth 2 (two) times daily.            CONTINUE taking these medications      fish oil-omega-3 fatty acids 300-1,000 mg capsule  Take by mouth once daily.     meloxicam 15 MG tablet  Commonly known as: MOBIC  Take 1 tablet (15 mg total) by mouth once daily.     NIFEdipine 60 MG Tbsr  Commonly known as: ADALAT CC  Take 60 mg by mouth once daily.     ondansetron 4 MG Tbdl  Commonly known as: ZOFRAN-ODT  Take 1 tablet (4 mg total) by mouth every 6 (six) hours as needed.     pantoprazole 40 MG tablet  Commonly known as: PROTONIX  Take 1 tablet (40 mg total) by mouth once daily.            STOP taking these medications      lactulose 20 gram/30 mL Soln  Commonly known as: CHRONULAC              Impression:  Chest pain - Resolved     - troponin negative x 3  Native CAD    - [facility] 04/29/22: patent stent RCA. All other vessels free of any disease  Hypertensive Urgency -  Resolved  HLD  CLL  Hepatitis  Venous stasis  Hx RBBB    Plan:   Echo reviewed. LVEF intact. No WMA.   Continue Nifedipine 60 mg daily. Increase to Coreg 12.5 mg BID.   Continue ASA 81 mg daily (instructed to make sure it is okay with her hem/onc).   Instructed to keep BP log at home.   Stable for discharge home today.  F/U with Dr. aHtfield in 1-2 weeks.     Time spent on the discharge of patient: 34 minutes    LINDA Partida  Cardiology  [facility] Medical Telemetry

## 2024-04-15 NOTE — NURSING
Nurses Note -- 4 Eyes      4/14/2024   8:54 PM      Skin assessed during: Admit      [] No Altered Skin Integrity Present    []Prevention Measures Documented      [] Yes- Altered Skin Integrity Present or Discovered   [] LDA Added if Not in Epic (Describe Wound)   [] New Altered Skin Integrity was Present on Admit and Documented in LDA   [] Wound Image Taken    Wound Care Consulted? No    Attending Nurse:  Lay Lawrence LPN     Second RN/Staff Member:   Diony Baker RN

## 2024-04-15 NOTE — NURSING
PT AAOx4, VSS. Educated on discharge instructions, new medications, follow up appointments, and signs and symptoms to return to the ED with. All questions answered. Pt verbalize understanding. IV discontinued and guaze/tape applied to site with no signs of bleeding or swelling noted. Telemetry monitor discontinued and returned to box. Pt wheeled down via wheelchair.

## 2024-04-16 LAB
OHS QRS DURATION: 104 MS
OHS QTC CALCULATION: 467 MS

## 2024-05-21 ENCOUNTER — OFFICE VISIT (OUTPATIENT)
Dept: ORTHOPEDICS | Facility: CLINIC | Age: 84
End: 2024-05-21
Payer: MEDICARE

## 2024-05-21 VITALS
BODY MASS INDEX: 38.33 KG/M2 | SYSTOLIC BLOOD PRESSURE: 182 MMHG | HEIGHT: 61 IN | HEART RATE: 61 BPM | DIASTOLIC BLOOD PRESSURE: 79 MMHG | WEIGHT: 203 LBS

## 2024-05-21 DIAGNOSIS — M16.11 PRIMARY OSTEOARTHRITIS OF RIGHT HIP: Primary | ICD-10-CM

## 2024-05-21 PROCEDURE — 99213 OFFICE O/P EST LOW 20 MIN: CPT | Mod: ,,, | Performed by: ORTHOPAEDIC SURGERY

## 2024-05-21 RX ORDER — SODIUM CHLORIDE 9 MG/ML
INJECTION, SOLUTION INTRAVENOUS CONTINUOUS
Status: CANCELLED | OUTPATIENT
Start: 2024-05-21

## 2024-05-21 NOTE — PROGRESS NOTES
"  Chief Complaint:   Chief Complaint   Patient presents with    Right Hip - Follow-up     Pt states she finished the anti-inflammatory medication which "somewhat helped to improve her pain". Pt states that she is still experiences pain at night and she describes as burning/throbbing/aching/dull.        History of present illness:    History of Present Illness  The patient presents for evaluation of right hip pain.    The patient continues to experience discomfort in his right hip, albeit with approximately 50 percent improvement. The discomfort is primarily localized to the anterior and lateral aspects of the hip. The patient has been managing the pain with anti-inflammatory medications and an extended period.    Past Medical History:   Diagnosis Date    CKD (chronic kidney disease) stage 3, GFR 30-59 ml/min     Essential (primary) hypertension        Past Surgical History:   Procedure Laterality Date    APPENDECTOMY      BLADDER SURGERY      COLONOSCOPY N/A 9/13/2023    Procedure: COLON;  Surgeon: Camilo Gordillo MD;  Location: [facility] ENDOSCOPY;  Service: Gastroenterology;  Laterality: N/A;  LATEX ALLERGY    GLAUCOMA SURGERY      HYSTERECTOMY      NECK SURGERY      SINUS SURGERY      x3    TONSILLECTOMY         Current Outpatient Medications   Medication Sig    aspirin 81 MG Chew Take 1 tablet (81 mg total) by mouth once daily.    carvediloL (COREG) 12.5 MG tablet Take 1 tablet (12.5 mg total) by mouth 2 (two) times daily.    NIFEdipine (ADALAT CC) 60 MG TbSR Take 60 mg by mouth once daily.    omega-3 fatty acids/fish oil (FISH OIL-OMEGA-3 FATTY ACIDS) 300-1,000 mg capsule Take by mouth once daily.    ondansetron (ZOFRAN-ODT) 4 MG TbDL Take 1 tablet (4 mg total) by mouth every 6 (six) hours as needed.    meloxicam (MOBIC) 15 MG tablet Take 1 tablet (15 mg total) by mouth once daily. (Patient not taking: Reported on 5/21/2024)    pantoprazole (PROTONIX) 40 MG tablet Take 1 tablet (40 mg total) by mouth once " daily.     No current facility-administered medications for this visit.       Review of patient's allergies indicates:   Allergen Reactions    Latex Swelling    Benazepril      cough  cough      Crestor [rosuvastatin]     Amoxicillin-pot clavulanate      Patient does not recall reaction type  Patient does not recall reaction type      Ciprofloxacin Rash     Other reaction(s): Bactrim  Urticaria,hives  Urticaria,hives  Urticaria,hives  Urticaria,hives      Shellfish containing products Nausea Only    Sulfamethoxazole-trimethoprim Itching and Other (See Comments)     bleeding  Bleeding (C diff colitis)         No family history on file.    Social History     Socioeconomic History    Marital status:    Tobacco Use    Smoking status: Never    Smokeless tobacco: Never   Substance and Sexual Activity    Alcohol use: Yes     Comment: wine yearly    Drug use: Never    Sexual activity: Not Currently     Partners: Male     Social Determinants of Health     Financial Resource Strain: Low Risk  (4/25/2022)    Received from [facility], [facility]    Overall Financial Resource Strain (CARDIA)     Difficulty of Paying Living Expenses: Not hard at all   Food Insecurity: No Food Insecurity (4/25/2022)    Received from [employer] [facility], [facility]    Hunger Vital Sign     Worried About Running Out of Food in the Last Year: Never true     Ran Out of Food in the Last Year: Never true   Transportation Needs: Unknown (4/25/2022)    Received from [facility], [facility]    PRAPARE - Transportation      Lack of Transportation (Medical): No   Housing Stability: Unknown (4/25/2022)    Received from [facility], [facility]    Housing Stability Vital Sign     Unable to Pay for Housing in the Last Year: No     Number of Places Lived in the Last Year: 1           Review of Systems:    Constitution: Negative for chills, fever, and sweats.  Negative for unexplained weight loss.    HENT:  Negative for headaches and blurry vision.    Cardiovascular:Negative for chest pain or irregular heart beat. Negative for hypertension.    Respiratory:  Negative for cough and shortness of breath.    Gastrointestinal: Negative for abdominal pain, heartburn, melena, nausea, and vomitting.    Genitourinary:  Negative bladder incontinence and dysuria.    Musculoskeletal:  See HPI    Neurological: Negative for numbness.    Psychiatric/Behavioral: Negative for depression.  The patient is not nervous/anxious.      Endocrine: Negative for polyuria    Hematologic/Lymphatic: Negative for bleeding problem.  Does not bruise/bleed easily.    Skin: Negative for poor would healing and rash      Physical Examination:    Vital Signs:    Vitals:    05/21/24 1047   BP: (!) 182/79   Pulse: 61       Body mass index is 38.36 kg/m².    General: No acute distress, alert and oriented, healthy appearing    HEENT: Head is atraumatic, mucous membranes are moist    Neck: Supples, no JVD    Cardiovascular: Palpable dorsalis pedis and posterior tibial pulses, regular rate and rhythm to those pulses    Lungs: Breathing non-labored    Skin: no rashes appreciated    Neurologic: Can flex and extend knees, ankles, and toes. Sensation is grossly intact    Right hip:  Range of motion of the right hip without significant or severe pain.  She is point tenderness laterally over greater trochanter trochanter.  Maximum internal  rotation also causes her lateral pain.    X-rays:      Assessment::  Right hip osteoarthritis    Plan:  Patient's symptoms seemed to be coming from her right hip joint.  We discussed all treatment options today.  Anti-inflammatories have failed to relieve her symptoms long term.  We will try a cortisone injection to see if this can improve her symptoms.  Risks, benefits alternatives to intra-articular right hip injection were discussed in detail.  All questions answered to the patient's satisfaction.  No guarantees made.    This note was generated with the assistance of ambient listening technology. Verbal consent was obtained by the patient and accompanying visitor(s) for the recording of patient appointment to facilitate this note. I attest to having reviewed and edited the generated note for accuracy, though some syntax or spelling errors may persist. Please contact the author of this note for any clarification.      This note was created using [employer] voice recognition software that occasionally misinterpreted phrases or words.    Consult note is delivered via Epic messaging service.

## 2024-05-21 NOTE — H&P (VIEW-ONLY)
"  Chief Complaint:   Chief Complaint   Patient presents with    Right Hip - Follow-up     Pt states she finished the anti-inflammatory medication which "somewhat helped to improve her pain". Pt states that she is still experiences pain at night and she describes as burning/throbbing/aching/dull.        History of present illness:    History of Present Illness  The patient presents for evaluation of right hip pain.    The patient continues to experience discomfort in his right hip, albeit with approximately 50 percent improvement. The discomfort is primarily localized to the anterior and lateral aspects of the hip. The patient has been managing the pain with anti-inflammatory medications and an extended period.    Past Medical History:   Diagnosis Date    CKD (chronic kidney disease) stage 3, GFR 30-59 ml/min     Essential (primary) hypertension        Past Surgical History:   Procedure Laterality Date    APPENDECTOMY      BLADDER SURGERY      COLONOSCOPY N/A 9/13/2023    Procedure: COLON;  Surgeon: Camilo Gordillo MD;  Location: [facility] ENDOSCOPY;  Service: Gastroenterology;  Laterality: N/A;  LATEX ALLERGY    GLAUCOMA SURGERY      HYSTERECTOMY      NECK SURGERY      SINUS SURGERY      x3    TONSILLECTOMY         Current Outpatient Medications   Medication Sig    aspirin 81 MG Chew Take 1 tablet (81 mg total) by mouth once daily.    carvediloL (COREG) 12.5 MG tablet Take 1 tablet (12.5 mg total) by mouth 2 (two) times daily.    NIFEdipine (ADALAT CC) 60 MG TbSR Take 60 mg by mouth once daily.    omega-3 fatty acids/fish oil (FISH OIL-OMEGA-3 FATTY ACIDS) 300-1,000 mg capsule Take by mouth once daily.    ondansetron (ZOFRAN-ODT) 4 MG TbDL Take 1 tablet (4 mg total) by mouth every 6 (six) hours as needed.    meloxicam (MOBIC) 15 MG tablet Take 1 tablet (15 mg total) by mouth once daily. (Patient not taking: Reported on 5/21/2024)    pantoprazole (PROTONIX) 40 MG tablet Take 1 tablet (40 mg total) by mouth once " daily.     No current facility-administered medications for this visit.       Review of patient's allergies indicates:   Allergen Reactions    Latex Swelling    Benazepril      cough  cough      Crestor [rosuvastatin]     Amoxicillin-pot clavulanate      Patient does not recall reaction type  Patient does not recall reaction type      Ciprofloxacin Rash     Other reaction(s): Bactrim  Urticaria,hives  Urticaria,hives  Urticaria,hives  Urticaria,hives      Shellfish containing products Nausea Only    Sulfamethoxazole-trimethoprim Itching and Other (See Comments)     bleeding  Bleeding (C diff colitis)         No family history on file.    Social History     Socioeconomic History    Marital status:    Tobacco Use    Smoking status: Never    Smokeless tobacco: Never   Substance and Sexual Activity    Alcohol use: Yes     Comment: wine yearly    Drug use: Never    Sexual activity: Not Currently     Partners: Male     Social Determinants of Health     Financial Resource Strain: Low Risk  (4/25/2022)    Received from [facility] Its Subsidiaries and Affiliates, [employer] [facility] and Its Subsidiaries and Affiliates    Overall Financial Resource Strain (CARDIA)     Difficulty of Paying Living Expenses: Not hard at all   Food Insecurity: No Food Insecurity (4/25/2022)    Received from [employer] [facility] and Its Subsidiaries and Affiliates, [employer] [facility] and Its Subsidiaries and Affiliates    Hunger Vital Sign     Worried About Running Out of Food in the Last Year: Never true     Ran Out of Food in the Last Year: Never true   Transportation Needs: Unknown (4/25/2022)    Received from [facility] Its Subsidiaries and Affiliates, [employer] [facility] Its Subsidiaries and Affiliates    PRAPARE - Transportation      Lack of Transportation (Medical): No   Housing Stability: Unknown (4/25/2022)    Received from [facility] Subsidiaries and Affiliates, [facility] Its Subsidiaries and Affiliates    Housing Stability Vital Sign     Unable to Pay for Housing in the Last Year: No     Number of Places Lived in the Last Year: 1           Review of Systems:    Constitution: Negative for chills, fever, and sweats.  Negative for unexplained weight loss.    HENT:  Negative for headaches and blurry vision.    Cardiovascular:Negative for chest pain or irregular heart beat. Negative for hypertension.    Respiratory:  Negative for cough and shortness of breath.    Gastrointestinal: Negative for abdominal pain, heartburn, melena, nausea, and vomitting.    Genitourinary:  Negative bladder incontinence and dysuria.    Musculoskeletal:  See HPI    Neurological: Negative for numbness.    Psychiatric/Behavioral: Negative for depression.  The patient is not nervous/anxious.      Endocrine: Negative for polyuria    Hematologic/Lymphatic: Negative for bleeding problem.  Does not bruise/bleed easily.    Skin: Negative for poor would healing and rash      Physical Examination:    Vital Signs:    Vitals:    05/21/24 1047   BP: (!) 182/79   Pulse: 61       Body mass index is 38.36 kg/m².    General: No acute distress, alert and oriented, healthy appearing    HEENT: Head is atraumatic, mucous membranes are moist    Neck: Supples, no JVD    Cardiovascular: Palpable dorsalis pedis and posterior tibial pulses, regular rate and rhythm to those pulses    Lungs: Breathing non-labored    Skin: no rashes appreciated    Neurologic: Can flex and extend knees, ankles, and toes. Sensation is grossly intact    Right hip:  Range of motion of the right hip without significant or severe pain.  She is point tenderness laterally over greater trochanter trochanter.  Maximum internal  rotation also causes her lateral pain.    X-rays:      Assessment::  Right hip osteoarthritis    Plan:  Patient's symptoms seemed to be coming from her right hip joint.  We discussed all treatment options today.  Anti-inflammatories have failed to relieve her symptoms long term.  We will try a cortisone injection to see if this can improve her symptoms.  Risks, benefits alternatives to intra-articular right hip injection were discussed in detail.  All questions answered to the patient's satisfaction.  No guarantees made.    This note was generated with the assistance of ambient listening technology. Verbal consent was obtained by the patient and accompanying visitor(s) for the recording of patient appointment to facilitate this note. I attest to having reviewed and edited the generated note for accuracy, though some syntax or spelling errors may persist. Please contact the author of this note for any clarification.      This note was created using [employer] voice recognition software that occasionally misinterpreted phrases or words.    Consult note is delivered via Epic messaging service.

## 2024-05-31 ENCOUNTER — ANESTHESIA EVENT (OUTPATIENT)
Dept: SURGERY | Facility: HOSPITAL | Age: 84
End: 2024-05-31
Payer: MEDICARE

## 2024-06-03 RX ORDER — UBIDECARENONE 30 MG
30 CAPSULE ORAL DAILY
COMMUNITY

## 2024-06-03 RX ORDER — CHOLECALCIFEROL (VITAMIN D3) 25 MCG
1000 TABLET ORAL DAILY
COMMUNITY

## 2024-06-03 RX ORDER — POTASSIUM &MAGNESIUM ASPARTATE 250-250 MG
1 CAPSULE ORAL DAILY
COMMUNITY

## 2024-06-12 ENCOUNTER — HOSPITAL ENCOUNTER (OUTPATIENT)
Facility: HOSPITAL | Age: 84
Discharge: HOME | End: 2024-06-12
Attending: ORTHOPAEDIC SURGERY | Admitting: ORTHOPAEDIC SURGERY
Payer: MEDICARE

## 2024-06-12 ENCOUNTER — ANESTHESIA (OUTPATIENT)
Dept: SURGERY | Facility: HOSPITAL | Age: 84
End: 2024-06-12
Payer: MEDICARE

## 2024-06-12 VITALS
TEMPERATURE: 96 F | DIASTOLIC BLOOD PRESSURE: 73 MMHG | SYSTOLIC BLOOD PRESSURE: 160 MMHG | WEIGHT: 201.25 LBS | HEIGHT: 61 IN | RESPIRATION RATE: 18 BRPM | HEART RATE: 53 BPM | BODY MASS INDEX: 38 KG/M2 | OXYGEN SATURATION: 98 %

## 2024-06-12 DIAGNOSIS — M16.11 PRIMARY OSTEOARTHRITIS OF RIGHT HIP: ICD-10-CM

## 2024-06-12 PROCEDURE — 25000003 PHARM REV CODE 250: Performed by: ORTHOPAEDIC SURGERY

## 2024-06-12 PROCEDURE — D9220A PRA ANESTHESIA: Mod: ANES,,, | Performed by: STUDENT IN AN ORGANIZED HEALTH CARE EDUCATION/TRAINING PROGRAM

## 2024-06-12 PROCEDURE — 20610 DRAIN/INJ JOINT/BURSA W/O US: CPT | Mod: RT,,, | Performed by: ORTHOPAEDIC SURGERY

## 2024-06-12 PROCEDURE — 37000009 HC ANESTHESIA EA ADD 15 MINS: Performed by: ORTHOPAEDIC SURGERY

## 2024-06-12 PROCEDURE — D9220A PRA ANESTHESIA: Mod: CRNA,,,

## 2024-06-12 PROCEDURE — 37000008 HC ANESTHESIA 1ST 15 MINUTES: Performed by: ORTHOPAEDIC SURGERY

## 2024-06-12 PROCEDURE — 20610 DRAIN/INJ JOINT/BURSA W/O US: CPT | Performed by: ORTHOPAEDIC SURGERY

## 2024-06-12 PROCEDURE — 63600175 PHARM REV CODE 636 W HCPCS: Performed by: ORTHOPAEDIC SURGERY

## 2024-06-12 RX ORDER — BETAMETHASONE SODIUM PHOSPHATE AND BETAMETHASONE ACETATE 3; 3 MG/ML; MG/ML
INJECTION, SUSPENSION INTRA-ARTICULAR; INTRALESIONAL; INTRAMUSCULAR; SOFT TISSUE
Status: DISCONTINUED | OUTPATIENT
Start: 2024-06-12 | End: 2024-06-12 | Stop reason: HOSPADM

## 2024-06-12 RX ORDER — LIDOCAINE HYDROCHLORIDE 10 MG/ML
INJECTION INFILTRATION; PERINEURAL
Status: DISCONTINUED
Start: 2024-06-12 | End: 2024-06-12 | Stop reason: HOSPADM

## 2024-06-12 RX ORDER — SODIUM CHLORIDE 9 MG/ML
INJECTION, SOLUTION INTRAVENOUS CONTINUOUS
Status: DISCONTINUED | OUTPATIENT
Start: 2024-06-12 | End: 2024-06-12 | Stop reason: HOSPADM

## 2024-06-12 RX ORDER — LIDOCAINE HYDROCHLORIDE 10 MG/ML
INJECTION INFILTRATION; PERINEURAL
Status: DISCONTINUED | OUTPATIENT
Start: 2024-06-12 | End: 2024-06-12 | Stop reason: HOSPADM

## 2024-06-12 RX ORDER — BETAMETHASONE SODIUM PHOSPHATE AND BETAMETHASONE ACETATE 3; 3 MG/ML; MG/ML
INJECTION, SUSPENSION INTRA-ARTICULAR; INTRALESIONAL; INTRAMUSCULAR; SOFT TISSUE
Status: DISCONTINUED
Start: 2024-06-12 | End: 2024-06-12 | Stop reason: HOSPADM

## 2024-06-12 NOTE — OP NOTE
Date of Procedure: 6/12/2024    Procedure: R ACETABULOFEMORAL JOINT INJECTION (HIP)/arthrogram    Provider: Ismael Mackey MD    Pre-Operative Diagnosis: Primary osteoarthritis of R hip     Post-Operative Diagnosis: as above    Anesthesia: General/MAC    Description of the Findings of the Procedure:     The patient was brought to the procedure room.  IV access was obtained prior to the procedure.  The patient was positioned on the fluoroscopy table.  Sedation was administered by anesthesia.  The skin overlying the hip was prepped and draped in a sterile fashion.  The skin and subcutaneous tissue was anesthetized using 1-2 cc of lidocaine 2%.  An 18 gauge, spinal needle was slowly advanced through under fluoroscopic guidance into the acetabulofemoral joint. The needle position was confirmed using oblique, AP and lateral fluoroscopic imaging.  2 cc of Omnipaque 300 was injected confirming intra-articular contrast spread. A combination of 12 mg betamethasone and 2 cc 2% lidocaine was easily injected. The needle was removed and band-aid placed.  A sterile dressing was applied. No specimens collected. Nell was taken to the Post-block Recovery Area for further observation. And discharged home when appropriate.    Complications: No    Estimated Blood Loss (EBL): Minimal           Specimens: none           Condition: Good    Disposition: PACU - hemodynamically stable.      Fluoroscopic guidance was used for needle localization.  Images were saved and stored for documentation.  The hip structures were identified and visualized.  Dynamic visualization of the 18g x 3.5 cm needle was continuous throughout the procedure and maintained in good position.

## 2024-06-12 NOTE — ANESTHESIA PREPROCEDURE EVALUATION
06/12/2024  Nell Mock is a 83 y.o., female.      Pre-op Assessment    I have reviewed the Patient Summary Reports.     I have reviewed the Nursing Notes. I have reviewed the NPO Status.   I have reviewed the Medications.     Review of Systems  Anesthesia Hx:  No problems with previous Anesthesia                Cardiovascular:  Exercise tolerance: good   Hypertension  Past MI                 Functional Capacity good / => 4 METS                         Pulmonary:  Pulmonary Normal                       Renal/:   Denies Chronic Renal Disease.                Hepatic/GI:  Hepatic/GI Normal                 Musculoskeletal:  Arthritis               Endocrine:        Obesity / BMI > 30  Dermatological:  Skin Normal    Psych:  Psychiatric Normal                    Physical Exam  General: Well nourished, Cooperative and Alert    Airway:  Mallampati: II   Mouth Opening: Normal  TM Distance: Normal  Tongue: Normal    Chest/Lungs:  Normal Respiratory Rate    Heart:  Rate: Normal        Anesthesia Plan  Type of Anesthesia, risks & benefits discussed:    Anesthesia Type: Gen Natural Airway  Intra-op Monitoring Plan: Standard ASA Monitors  Post Op Pain Control Plan: IV/PO Opioids PRN  (medical reason for not using multimodal pain management)  Induction:  IV  Informed Consent: Informed consent signed with the Patient and all parties understand the risks and agree with anesthesia plan.  All questions answered.   ASA Score: 3  Day of Surgery Review of History & Physical: H&P Update referred to the surgeon/provider.    Ready For Surgery From Anesthesia Perspective.     .

## 2024-06-12 NOTE — TRANSFER OF CARE
"Anesthesia Transfer of Care Note    Patient: Nell Mock    Procedure(s) Performed: Procedure(s) (LRB):  Injection, Joint, Hip right hip injection with anesthesia (Right)    Patient location: OPS    Anesthesia Type: general    Transport from OR: Transported from OR on room air with adequate spontaneous ventilation    Post pain: adequate analgesia    Post assessment: no apparent anesthetic complications and tolerated procedure well    Post vital signs: stable    Level of consciousness: awake    Nausea/Vomiting: no nausea/vomiting    Complications: none    Transfer of care protocol was followed      Last vitals: Visit Vitals  BP (!) 173/70   Pulse (!) 53   Temp (!) 35.3 °C (95.6 °F)   Resp 20   Ht 5' 1" (1.549 m)   Wt 91.3 kg (201 lb 4.5 oz)   SpO2 (!) 94%   Breastfeeding No   BMI 38.03 kg/m²     "

## 2024-06-13 NOTE — ANESTHESIA POSTPROCEDURE EVALUATION
Anesthesia Post Evaluation    Patient: Nell Mock    Procedure(s) Performed: Procedure(s) (LRB):  Injection, Joint, Hip right hip injection with anesthesia (Right)    Final Anesthesia Type: general      Patient location during evaluation: PACU  Patient participation: Yes- Able to Participate  Level of consciousness: awake and alert  Post-procedure vital signs: reviewed and stable  Pain management: adequate  Airway patency: patent    PONV status at discharge: No PONV  Anesthetic complications: no      Respiratory status: unassisted  Hydration status: euvolemic  Follow-up not needed.              Vitals Value Taken Time   /73 06/12/24 0700   Temp nl 06/13/24 0936   Pulse 54 06/12/24 0703   Resp 18 06/12/24 0700   SpO2 97 % 06/12/24 0703   Vitals shown include unfiled device data.      No case tracking events are documented in the log.      Pain/Mak Score: Mak Score: 10 (6/12/2024  7:19 AM)  Modified Mak Score: 20 (6/12/2024  7:19 AM)

## 2024-07-02 ENCOUNTER — TELEPHONE (OUTPATIENT)
Dept: ORTHOPEDICS | Facility: CLINIC | Age: 84
End: 2024-07-02

## 2024-07-02 ENCOUNTER — OFFICE VISIT (OUTPATIENT)
Dept: ORTHOPEDICS | Facility: CLINIC | Age: 84
End: 2024-07-02
Payer: MEDICARE

## 2024-07-02 ENCOUNTER — HOSPITAL ENCOUNTER (OUTPATIENT)
Dept: RADIOLOGY | Facility: CLINIC | Age: 84
Discharge: HOME | End: 2024-07-02
Attending: NURSE PRACTITIONER
Payer: MEDICARE

## 2024-07-02 VITALS
HEART RATE: 68 BPM | HEIGHT: 61 IN | SYSTOLIC BLOOD PRESSURE: 168 MMHG | DIASTOLIC BLOOD PRESSURE: 74 MMHG | WEIGHT: 201.25 LBS | BODY MASS INDEX: 38 KG/M2

## 2024-07-02 DIAGNOSIS — M16.11 PRIMARY OSTEOARTHRITIS OF RIGHT HIP: Primary | ICD-10-CM

## 2024-07-02 DIAGNOSIS — M16.11 PRIMARY OSTEOARTHRITIS OF RIGHT HIP: ICD-10-CM

## 2024-07-02 PROCEDURE — 99213 OFFICE O/P EST LOW 20 MIN: CPT | Mod: ,,, | Performed by: NURSE PRACTITIONER

## 2024-07-02 PROCEDURE — 73502 X-RAY EXAM HIP UNI 2-3 VIEWS: CPT | Mod: RT,,, | Performed by: NURSE PRACTITIONER

## 2024-07-02 NOTE — LETTER
Cardiac Risk Assessment Request Form  Patients Name: ALFREDA MERCHANT  : 1940  Today's Date: 2024     Dr. GREEN ,          The above named patient is scheduled to have a ROBOTIC RT TOTAL HIP ARTHROPLASTY on 24.    Surgeon: Ismael Faith MD  Type of Anesthesia: Spinal    This patient needs surgical clearance from your office for this procedure.  Please perform necessary tests in order for this patient to be medically cleared for surgery. Please send or fax the clearance letter with most recent ECHO, EKG or stress test, to our office as soon as possible.     Please include any medication instructions that should be held prior to surgery.     We appreciate your help in getting our patient cleared for surgery.    Please feel free to call our office should you have any questions.     Thank you,   Ismael Faith MD / [facility]      Phone Number: [phone]  Fax Number: [phone]

## 2024-07-02 NOTE — TELEPHONE ENCOUNTER
I faxed the surgery clearance request.     Provider: DR. GREEN (CARDIOLOGIST)  Group: [id number]  Sx Date: 8/26/24  Date Faxed: 7/2/24  F #: [phone]

## 2024-07-02 NOTE — PROGRESS NOTES
Chief Complaint:   Chief Complaint   Patient presents with    Follow-up     Rt hip pain last injection 6/12/24- Reports traveling pain from groin into hip only at night. Stated injection did not help too much with pain. Stated has a pin and needles sensation at times. Is taking tylenol which does not help with pain.        History of present illness: Nell Mock is a 83 y.o. female, presents to clinic today in regards to her right hip.  Patient had a intra-articular cortisone injection several weeks back.  She states that she got really good relief for about 3 days.  Pain has since then returned.  Pain is located to the anterior groin.  Does not radiate down the leg.  Worse with any type of prolonged ambulation and bending.  This pain has began to affect the patient's living activities.  Ambulates without any assistance at this point.  We would like to speak about her different options going forward and is interested in surgical intervention.    Past Medical History:   Diagnosis Date    Arthritis     CKD (chronic kidney disease) stage 3, GFR 30-59 ml/min     Digestive disorder     Essential (primary) hypertension     Leukemia     in remission    MI (myocardial infarction)        Past Surgical History:   Procedure Laterality Date    APPENDECTOMY      BLADDER SURGERY      BONE MARROW BIOPSY W/ ASPIRATION      CATARACT EXTRACTION W/  INTRAOCULAR LENS IMPLANT Bilateral     COLONOSCOPY N/A 09/13/2023    Procedure: COLON;  Surgeon: Camilo Gordillo MD;  Location: [facility] ENDOSCOPY;  Service: Gastroenterology;  Laterality: N/A;  LATEX ALLERGY    CORONARY ANGIOPLASTY WITH STENT PLACEMENT      HYSTERECTOMY      INJECTION OF JOINT Right 6/12/2024    Procedure: Injection, Joint, Hip right hip injection with anesthesia;  Surgeon: Ismael Mackey MD;  Location: [facility] OR;  Service: Orthopedics;  Laterality: Right;  right hip injection with anesthesia    NECK SURGERY      SINUS SURGERY      x3    TONSILLECTOMY          Current Outpatient Medications   Medication Sig    aspirin 81 MG Chew Take 1 tablet (81 mg total) by mouth once daily. (Patient taking differently: Take 81 mg by mouth every other day.)    carvediloL (COREG) 12.5 MG tablet Take 1 tablet (12.5 mg total) by mouth 2 (two) times daily.    co-enzyme Q-10 30 mg capsule Take 30 mg by mouth once daily.    cranberry 500 mg Cap Take 1 capsule by mouth Daily.    NIFEdipine (ADALAT CC) 60 MG TbSR Take 60 mg by mouth once daily.    omega-3 fatty acids/fish oil (FISH OIL-OMEGA-3 FATTY ACIDS) 300-1,000 mg capsule Take by mouth once daily.    vitamin D (VITAMIN D3) 1000 units Tab Take 1,000 Units by mouth once daily.    pantoprazole (PROTONIX) 40 MG tablet Take 1 tablet (40 mg total) by mouth once daily.     No current facility-administered medications for this visit.       Review of patient's allergies indicates:   Allergen Reactions    Latex Swelling    Benazepril      cough  cough      Crestor [rosuvastatin]     Amoxicillin-pot clavulanate      Patient does not recall reaction type  Patient does not recall reaction type      Ciprofloxacin Rash     Other reaction(s): Bactrim  Urticaria,hives  Urticaria,hives  Urticaria,hives  Urticaria,hives      Shellfish containing products Nausea Only    Sulfamethoxazole-trimethoprim Itching and Other (See Comments)     bleeding  Bleeding (C diff colitis)         No family history on file.    Social History     Socioeconomic History    Marital status:    Tobacco Use    Smoking status: Never    Smokeless tobacco: Never   Substance and Sexual Activity    Alcohol use: Yes     Comment: wine yearly    Drug use: Never    Sexual activity: Not Currently     Partners: Male     Social Determinants of Health     Financial Resource Strain: Low Risk  (4/25/2022)    Received from [facility] and Its Subsidiaries and Affiliates, [facility] and Its Subsidiaries and  Affiliates    Overall Financial Resource Strain (CARDIA)     Difficulty of Paying Living Expenses: Not hard at all   Food Insecurity: No Food Insecurity (4/25/2022)    [facility], [facility] and Affiliates    Hunger Vital Sign     Worried About Running Out of Food in the Last Year: Never true     Ran Out of Food in the Last Year: Never true   Transportation Needs: Unknown (4/25/2022)    Received from [facility] and Affiliates, [facility] and Affiliates    PRAPARE - Transportation     Lack of Transportation (Medical): No   Housing Stability: Unknown (4/25/2022)    [facility], [facility]    Housing Stability Vital Sign     Unable to Pay for Housing in the Last Year: No     Number of Places Lived in the Last Year: 1           Review of Systems:    Denies fevers, chills, chest pain, shortness of breath. Comprehensive review of systems performed and otherwise negative except as noted in HPI     Physical Examination:    General: awake and alert, no acute distress, healthy appearing  Head and Neck: Head atraumatic/normocephalic. Moist MM  CV: brisk cap refill  Lungs: non-labored breathing, w/o cough or SOB  Skin: no rashes present, warm to touch  Neuro: sensation grossly intact distally       Vital Signs:    Vitals:    07/02/24 0929   BP: (!) 168/74   Pulse: 68       Body mass index is 38.03 kg/m².    Right hip:  Range of motion of the right hip without significant or severe pain.  She is point tenderness laterally over greater trochanter trochanter.  Maximum internal rotation also causes her  lateral pain.         Assessment::  Primary osteoarthritis right hip    Plan:  At this point the patient is tried and failed all conservative management with regards to their right hip. They have tried and failed nonoperative management including: Anti-inflammatories, activity modification, and assistive devices. All of these have failed to completely remove their pain. They have pain putting on shoes and socks, getting in and out of a car, as well as walking on level ground. Their hip pain is affecting activities of daily living They feel that they've reached a point of disability with regards to their hip. X-rays reveal advanced, end-stage degenerative osteoarthritis as noted by subchondral sclerosis, joint space narrowing, and periarticular osteophytes. The patient would like to proceed with surgical intervention and would be a good candidate for total hip replacement.    Total hip arthroplasty procedure, alternatives, risks, and benefits were discussed in detail. The risks including but not limited to: infection, need for revision surgery, pain, swelling, loosening, injury to surrounding neurovascular structures, stiffness, incomplete resolution of pain, limb length discrepancy, DVT, PE, and death were discussed in detail. Despite these risks, the patient would like to proceed with surgical intervention. All questions were answered, no guarantees made. Will plan for right ELISEO on end of August.    Taking the patients advanced age into consideration, the patient is at higher risk for falls, dehydration, electrolyte imbalance, post-operative anemia and a higher risk of adverse effects related to surgery and anesthesia. Will admit as inpatient and expect the patient to remain in the hospital for 2 nights. We will provide close monitoring of vital signs for hypotension, level of consciousness, respiratory status and the patients overall response to narcotics. Will also monitor labs daily, replace electrolytes and  provide fluid resuscitation as needed and plan to facilitate discharge once patient is hemodynamically stable, electrolytes WNL, tolerating pain and narcotics appropriately and once the patient has been deemed safe from a mobility/safety standpoint.    The patient has a history of cardiac disease and is at high risk for adverse effects of surgery and anesthesia. We will admit as inpatient and expect the patient to stay 2 nights in the hospital. We will closely monitor fluid intake and output, vital signs, neurological assessments and will use telemetry if needed. We plan for discharge once patient is stable and at baseline    This note was created using [employer]ivesqu[employer] voice recognition software that occasionally misinterpreted phrases or words.    Consult note is delivered via Epic messaging service.

## 2024-07-25 ENCOUNTER — OFFICE VISIT (OUTPATIENT)
Dept: ORTHOPEDICS | Facility: CLINIC | Age: 84
End: 2024-07-25
Payer: MEDICARE

## 2024-07-25 VITALS
WEIGHT: 203 LBS | DIASTOLIC BLOOD PRESSURE: 76 MMHG | HEART RATE: 74 BPM | SYSTOLIC BLOOD PRESSURE: 148 MMHG | HEIGHT: 61 IN | BODY MASS INDEX: 38.33 KG/M2

## 2024-07-25 DIAGNOSIS — M16.11 PRIMARY OSTEOARTHRITIS OF RIGHT HIP: Primary | ICD-10-CM

## 2024-07-25 NOTE — PROGRESS NOTES
Chief Complaint:   Chief Complaint   Patient presents with    Right Hip - Follow-up     Pt presents to discuss the possibility of having a Rt ELISEO in a near future. Pt states she tried all the conservative treatments, but nothing relieve the pain.       History of present illness:    History of Present Illness  The patient presents for evaluation of hip pain. She is accompanied by her daughter.    The patient received an injection in her right hip, which initially provided relief for a duration of 3 days. However, the pain has since recurred. The patient's daughter has expressed concerns about the necessity of an MRI, suspecting that the patient's hip pain may be related to her previous accident. The patient's ability to cross her legs has been compromised over the years, with one leg appearing 3 inches larger than the other, accompanied by swelling due to arthritis. She has a history of varicose veins, which were subsequently stripped. The patient's discomfort is particularly bothersome at night, and she is unable to find a comfortable sleeping position. She also suffers from hip pain following a fall. Despite undergoing physical therapy, she found it ineffective and exacerbated her condition. She has scheduled a consultation with Joseph, the nurse practitioner, for blood work, EKG, chest x-ray, and CAT scan on 08/13/2023.    Past Medical History:   Diagnosis Date    Arthritis     CKD (chronic kidney disease) stage 3, GFR 30-59 ml/min     Digestive disorder     Essential (primary) hypertension     Leukemia     in remission    MI (myocardial infarction)        Past Surgical History:   Procedure Laterality Date    APPENDECTOMY      BLADDER SURGERY      BONE MARROW BIOPSY W/ ASPIRATION      CATARACT EXTRACTION W/  INTRAOCULAR LENS IMPLANT Bilateral     COLONOSCOPY N/A 09/13/2023    Procedure: COLON;  Surgeon: Camilo Gordillo MD;  Location: [facility] ENDOSCOPY;  Service: Gastroenterology;  Laterality: N/A;  LATEX  ALLERGY    CORONARY ANGIOPLASTY WITH STENT PLACEMENT      HYSTERECTOMY      INJECTION OF JOINT Right 6/12/2024    Procedure: Injection, Joint, Hip right hip injection with anesthesia;  Surgeon: Ismael Mackey MD;  Location: [facility];  Service: Orthopedics;  Laterality: Right;  right hip injection with anesthesia    NECK SURGERY      SINUS SURGERY      x3    TONSILLECTOMY         Current Outpatient Medications   Medication Sig    aspirin 81 MG Chew Take 1 tablet (81 mg total) by mouth once daily. (Patient taking differently: Take 81 mg by mouth every other day.)    carvediloL (COREG) 12.5 MG tablet Take 1 tablet (12.5 mg total) by mouth 2 (two) times daily.    co-enzyme Q-10 30 mg capsule Take 30 mg by mouth once daily.    cranberry 500 mg Cap Take 1 capsule by mouth Daily.    NIFEdipine (ADALAT CC) 60 MG TbSR Take 60 mg by mouth once daily.    omega-3 fatty acids/fish oil (FISH OIL-OMEGA-3 FATTY ACIDS) 300-1,000 mg capsule Take by mouth once daily.    vitamin D (VITAMIN D3) 1000 units Tab Take 1,000 Units by mouth once daily.    pantoprazole (PROTONIX) 40 MG tablet Take 1 tablet (40 mg total) by mouth once daily.     No current facility-administered medications for this visit.       Review of patient's allergies indicates:   Allergen Reactions    Latex Swelling    Benazepril      cough  cough      Crestor [rosuvastatin]     Amoxicillin-pot clavulanate      Patient does not recall reaction type  Patient does not recall reaction type      Ciprofloxacin Rash     Other reaction(s): Bactrim  Urticaria,hives  Urticaria,hives  Urticaria,hives  Urticaria,hives      Shellfish containing products Nausea Only    Sulfamethoxazole-trimethoprim Itching and Other (See Comments)     bleeding  Bleeding (C diff colitis)         No family history on file.    Social History     Socioeconomic History    Marital status:    Tobacco Use    Smoking status: Never    Smokeless tobacco: Never   Substance and Sexual Activity    Alcohol  use: Yes     Comment: wine yearly    Drug use: Never    Sexual activity: Not Currently     Partners: Male     Social Determinants of Health     Financial Resource Strain: Low Risk  (4/25/2022)    [facility]    Overall Financial Resource Strain (CARDIA)     Difficulty of Paying Living Expenses: Not hard at all   Food Insecurity: No Food Insecurity (4/25/2022)    [facility], [facility]    Hunger Vital Sign     Worried About Running Out of Food in the Last Year: Never true     Ran Out of Food in the Last Year: Never true   Transportation Needs: Unknown (4/25/2022)    Received from [facility] and Affiliates, [facility]    PRAPARE - Transportation     Lack of Transportation (Medical): No   Housing Stability: Unknown (4/25/2022)    [facility], [facility] and [facility]    Housing Stability Vital Sign     Unable to Pay for Housing in the Last Year: No     Number of Places Lived in the Last Year: 1           Review of Systems:    Constitution: Negative for chills, fever, and sweats.  Negative for unexplained weight loss.    HENT:  Negative for headaches and blurry vision.    Cardiovascular:Negative for chest pain or irregular heart beat. Negative for hypertension.    Respiratory:  Negative for cough and shortness of breath.    Gastrointestinal: Negative for abdominal pain, heartburn, melena, nausea, and  vomitting.    Genitourinary:  Negative bladder incontinence and dysuria.    Musculoskeletal:  See HPI    Neurological: Negative for numbness.    Psychiatric/Behavioral: Negative for depression.  The patient is not nervous/anxious.      Endocrine: Negative for polyuria    Hematologic/Lymphatic: Negative for bleeding problem.  Does not bruise/bleed easily.    Skin: Negative for poor would healing and rash      Physical Examination:    Vital Signs:    Vitals:    07/25/24 0840   BP: (!) 148/76   Pulse: 74       Body mass index is 38.36 kg/m².    General: No acute distress, alert and oriented, healthy appearing    HEENT: Head is atraumatic, mucous membranes are moist    Neck: Supples, no JVD    Cardiovascular: Palpable dorsalis pedis and posterior tibial pulses, regular rate and rhythm to those pulses    Lungs: Breathing non-labored    Skin: no rashes appreciated    Neurologic: Can flex and extend knees, ankles, and toes. Sensation is grossly intact    Right hip:  Range of motion of the right hip with significant pain discomfort.  Brisk cap refill disappeared sensation intact distally.  Internal and external rotation cause the patient's significant pain.    X-rays:  Three views right hip reviewed.  Patient with end-stage osteoarthritis.  Complete loss of joint space and bone-on-bone articulation central aspect of the right hip     Assessment::  Right hip osteoarthritis    Plan:  At this point the patient is tried and failed all conservative management with regards to their hip. They have tried and failed nonoperative management including: Anti-inflammatories, activity modification, the use of a cane. All of these have failed to completely remove their pain. They have pain going up and down stairs as well as walking on level ground. The hip pain is affecting activities of daily living They feel that theyve reached a point of disability with regards to their hip. X-rays reveal advanced, end-stage degenerative  osteoarthritis as noted by subchondral sclerosis, joint space narrowing, and periarticular osteophytes. The patient would like to proceed with surgical intervention and would be a good candidate for total hip replacement.    Total hip arthroplasty procedure, alternatives, risks, and benefits were discussed in detail. The risks including but not limited to: infection, need for revision surgery, pain, swelling, loosening, injury to surrounding neurovascular structures, stiffness, incomplete resolution of pain, DVT, PE, and death were discussed in detail. Despite these risks, the patient would like to proceed with surgical intervention. All questions were answered, no guarantees made. Will plan for right ELISEO on August.      This note was generated with the assistance of ambient listening technology. Verbal consent was obtained by the patient and accompanying visitor(s) for the recording of patient appointment to facilitate this note. I attest to having reviewed and edited the generated note for accuracy, though some syntax or spelling errors may persist. Please contact the author of this note for any clarification.      This note was created using [employer] voice recognition software that occasionally misinterpreted phrases or words.    Consult note is delivered via Epic messaging service.

## 2024-07-29 ENCOUNTER — TELEPHONE (OUTPATIENT)
Dept: ORTHOPEDICS | Facility: CLINIC | Age: 84
End: 2024-07-29
Payer: MEDICARE

## 2024-07-29 NOTE — TELEPHONE ENCOUNTER
Patient called to inform the office she had blood work done on yesterday at infusion clinic and her Glucose is 49 which she stated was low. She also inform that she she an appt with MD Payne next week and see will what they say. She mentioned she will bring results from the doctor. I let her know if the doctor there is not ok with her having surgery to call us so we can cancel/reschedule her surgery for another time when she gets the ok. Patient understood verbal instructions.

## 2024-08-01 ENCOUNTER — TELEPHONE (OUTPATIENT)
Dept: ORTHOPEDICS | Facility: CLINIC | Age: 84
End: 2024-08-01
Payer: MEDICARE

## 2024-08-01 NOTE — TELEPHONE ENCOUNTER
I called Dr. Hatfield office to get an update on cardiac clearance. Spoke with Marielle. They will be faxing over the clearance today.

## 2024-08-13 ENCOUNTER — OFFICE VISIT (OUTPATIENT)
Dept: ORTHOPEDICS | Facility: CLINIC | Age: 84
End: 2024-08-13
Payer: MEDICARE

## 2024-08-13 ENCOUNTER — HOSPITAL ENCOUNTER (EMERGENCY)
Facility: HOSPITAL | Age: 84
Discharge: HOME | End: 2024-08-13
Attending: EMERGENCY MEDICINE
Payer: MEDICARE

## 2024-08-13 VITALS
WEIGHT: 200 LBS | HEIGHT: 61 IN | HEART RATE: 86 BPM | BODY MASS INDEX: 37.76 KG/M2 | OXYGEN SATURATION: 98 % | SYSTOLIC BLOOD PRESSURE: 164 MMHG | TEMPERATURE: 98 F | RESPIRATION RATE: 18 BRPM | DIASTOLIC BLOOD PRESSURE: 84 MMHG

## 2024-08-13 VITALS
DIASTOLIC BLOOD PRESSURE: 75 MMHG | WEIGHT: 203 LBS | HEIGHT: 61 IN | SYSTOLIC BLOOD PRESSURE: 162 MMHG | BODY MASS INDEX: 38.33 KG/M2 | HEART RATE: 77 BPM

## 2024-08-13 DIAGNOSIS — M25.569 KNEE PAIN: ICD-10-CM

## 2024-08-13 DIAGNOSIS — R58 ECCHYMOSIS: Primary | ICD-10-CM

## 2024-08-13 DIAGNOSIS — M16.11 PRIMARY OSTEOARTHRITIS OF RIGHT HIP: Primary | ICD-10-CM

## 2024-08-13 LAB
ALBUMIN SERPL-MCNC: 3.5 G/DL (ref 3.4–4.8)
ALBUMIN/GLOB SERPL: 1 RATIO (ref 1.1–2)
ALP SERPL-CCNC: 137 UNIT/L (ref 40–150)
ALT SERPL-CCNC: 23 UNIT/L (ref 0–55)
ANION GAP SERPL CALC-SCNC: 10 MEQ/L
APTT PPP: 26.6 SECONDS (ref 23.4–33.9)
AST SERPL-CCNC: 41 UNIT/L (ref 5–34)
BASOPHILS # BLD AUTO: 0.04 X10(3)/MCL
BASOPHILS NFR BLD AUTO: 0.7 %
BILIRUB SERPL-MCNC: 0.2 MG/DL
BUN SERPL-MCNC: 17.6 MG/DL (ref 9.8–20.1)
CALCIUM SERPL-MCNC: 9.9 MG/DL (ref 8.4–10.2)
CHLORIDE SERPL-SCNC: 106 MMOL/L (ref 98–107)
CO2 SERPL-SCNC: 27 MMOL/L (ref 23–31)
CREAT SERPL-MCNC: 1.17 MG/DL (ref 0.55–1.02)
CREAT/UREA NIT SERPL: 15
EOSINOPHIL # BLD AUTO: 0.09 X10(3)/MCL (ref 0–0.9)
EOSINOPHIL NFR BLD AUTO: 1.6 %
ERYTHROCYTE [DISTWIDTH] IN BLOOD BY AUTOMATED COUNT: 14.4 % (ref 11.5–17)
GFR SERPLBLD CREATININE-BSD FMLA CKD-EPI: 47 ML/MIN/1.73/M2
GLOBULIN SER-MCNC: 3.4 GM/DL (ref 2.4–3.5)
GLUCOSE SERPL-MCNC: 115 MG/DL (ref 82–115)
HCT VFR BLD AUTO: 40.6 % (ref 37–47)
HGB BLD-MCNC: 13.3 G/DL (ref 12–16)
IMM GRANULOCYTES # BLD AUTO: 0.01 X10(3)/MCL (ref 0–0.04)
IMM GRANULOCYTES NFR BLD AUTO: 0.2 %
INR PPP: 0.9 (ref 2–3)
LYMPHOCYTES # BLD AUTO: 1.37 X10(3)/MCL (ref 0.6–4.6)
LYMPHOCYTES NFR BLD AUTO: 25.1 %
MCH RBC QN AUTO: 29 PG (ref 27–31)
MCHC RBC AUTO-ENTMCNC: 32.8 G/DL (ref 33–36)
MCV RBC AUTO: 88.5 FL (ref 80–94)
MONOCYTES # BLD AUTO: 0.24 X10(3)/MCL (ref 0.1–1.3)
MONOCYTES NFR BLD AUTO: 4.4 %
NEUTROPHILS # BLD AUTO: 3.71 X10(3)/MCL (ref 2.1–9.2)
NEUTROPHILS NFR BLD AUTO: 68 %
NRBC BLD AUTO-RTO: 0 %
PLATELET # BLD AUTO: 144 X10(3)/MCL (ref 130–400)
PMV BLD AUTO: 10.9 FL (ref 7.4–10.4)
POTASSIUM SERPL-SCNC: 4.1 MMOL/L (ref 3.5–5.1)
PROT SERPL-MCNC: 6.9 GM/DL (ref 5.8–7.6)
PROTHROMBIN TIME: 12.5 SECONDS (ref 11.7–14.5)
RBC # BLD AUTO: 4.59 X10(6)/MCL (ref 4.2–5.4)
SODIUM SERPL-SCNC: 143 MMOL/L (ref 136–145)
WBC # BLD AUTO: 5.46 X10(3)/MCL (ref 4.5–11.5)

## 2024-08-13 PROCEDURE — 99213 OFFICE O/P EST LOW 20 MIN: CPT | Mod: ,,, | Performed by: NURSE PRACTITIONER

## 2024-08-13 PROCEDURE — 99284 EMERGENCY DEPT VISIT MOD MDM: CPT | Mod: 25

## 2024-08-13 PROCEDURE — 85610 PROTHROMBIN TIME: CPT

## 2024-08-13 PROCEDURE — 85025 COMPLETE CBC W/AUTO DIFF WBC: CPT

## 2024-08-13 PROCEDURE — 85730 THROMBOPLASTIN TIME PARTIAL: CPT

## 2024-08-13 PROCEDURE — 80053 COMPREHEN METABOLIC PANEL: CPT

## 2024-08-13 RX ORDER — FUROSEMIDE 20 MG/1
20 TABLET ORAL DAILY PRN
COMMUNITY
Start: 2024-06-10

## 2024-08-13 NOTE — PROGRESS NOTES
Chief Complaint:   Chief Complaint   Patient presents with    Right Hip - Pre-op Exam     Patient presents to communicate that she would like to cancel her upcoming surgery.        History of present illness: Nell Mock is a 83 y.o. female, presents to clinic today in regards to her right hip.  Patient was scheduled for right total hip arthroplasty.  States the pain is not that significant.  He only has pain when she gets up from a seated position.  As she ambulates the pain does get better.  At this time she would like to hold off from any surgical intervention.    Past Medical History:   Diagnosis Date    Arthritis     CKD (chronic kidney disease) stage 3, GFR 30-59 ml/min     Digestive disorder     Essential (primary) hypertension     Leukemia     in remission    MI (myocardial infarction)        Past Surgical History:   Procedure Laterality Date    APPENDECTOMY      BLADDER SURGERY      BONE MARROW BIOPSY W/ ASPIRATION      CATARACT EXTRACTION W/  INTRAOCULAR LENS IMPLANT Bilateral     COLONOSCOPY N/A 09/13/2023    Procedure: COLON;  Surgeon: Camilo Gordillo MD;  Location: [facility] ENDOSCOPY;  Service: Gastroenterology;  Laterality: N/A;  LATEX ALLERGY    CORONARY ANGIOPLASTY WITH STENT PLACEMENT      HYSTERECTOMY      INJECTION OF JOINT Right 6/12/2024    Procedure: Injection, Joint, Hip right hip injection with anesthesia;  Surgeon: Ismael Mackey MD;  [facility];  Service: Orthopedics;  Laterality: Right;  right hip injection with anesthesia    NECK SURGERY      SINUS SURGERY      x3    TONSILLECTOMY         Current Outpatient Medications   Medication Sig    aspirin 81 MG Chew Take 1 tablet (81 mg total) by mouth once daily. (Patient taking differently: Take 81 mg by mouth every other day.)    carvediloL (COREG) 12.5 MG tablet Take 1 tablet (12.5 mg total) by mouth 2 (two) times daily.    co-enzyme Q-10 30 mg capsule Take 30 mg by mouth once daily.    cranberry 500 mg Cap Take 1 capsule by  mouth Daily.    NIFEdipine (ADALAT CC) 60 MG TbSR Take 60 mg by mouth once daily.    omega-3 fatty acids/fish oil (FISH OIL-OMEGA-3 FATTY ACIDS) 300-1,000 mg capsule Take by mouth once daily.    vitamin D (VITAMIN D3) 1000 units Tab Take 1,000 Units by mouth once daily.    pantoprazole (PROTONIX) 40 MG tablet Take 1 tablet (40 mg total) by mouth once daily.     No current facility-administered medications for this visit.       Review of patient's allergies indicates:   Allergen Reactions    Latex Swelling    Benazepril      cough  cough      Crestor [rosuvastatin]     Amoxicillin-pot clavulanate      Patient does not recall reaction type  Patient does not recall reaction type      Ciprofloxacin Rash     Other reaction(s): Bactrim  Urticaria,hives  Urticaria,hives  Urticaria,hives  Urticaria,hives      Shellfish containing products Nausea Only    Sulfamethoxazole-trimethoprim Itching and Other (See Comments)     bleeding  Bleeding (C diff colitis)         Family History   Family history unknown: Yes       Social History     Socioeconomic History    Marital status:    Tobacco Use    Smoking status: Never    Smokeless tobacco: Never   Substance and Sexual Activity    Alcohol use: Yes     Comment: wine yearly    Drug use: Never    Sexual activity: Not Currently     Partners: Male     Social Determinants of Health     Financial Resource Strain: Low Risk  (4/25/2022)    Received from [facility], [place] and [facility]    Overall Financial Resource Strain (CARDIA)     Difficulty of Paying Living Expenses: Not hard at all   Food Insecurity: No Food Insecurity (4/25/2022)    Received from [facility] and [facility], [place] and Aff[facility]ates    Hunger Vital Sign      Worried About Running Out of Food in the Last Year: Never true     Ran Out of Food in the Last Year: Never true   Transportation Needs: Unknown (4/25/2022)    [facility], [facility] and [facility]    PRAPARE - Transportation     Lack of Transportation (Medical): No   Housing Stability: Unknown (4/25/2022)    [facility], [facility]    Housing Stability Vital Sign     Unable to Pay for Housing in the Last Year: No     Number of Places Lived in the Last Year: 1           Review of Systems:    Denies fevers, chills, chest pain, shortness of breath. Comprehensive review of systems performed and otherwise negative except as noted in HPI     Physical Examination:    General: awake and alert, no acute distress, healthy appearing  Head and Neck: Head atraumatic/normocephalic. Moist MM  CV: brisk cap refill  Lungs: non-labored breathing, w/o cough or SOB  Skin: no rashes present, warm to touch  Neuro: sensation grossly intact distally       Vital Signs:    Vitals:    08/13/24 0829   BP: (!) 162/75   Pulse: 77       Body mass index is 38.36 kg/m².    Right hip:  Range of motion of the right hip with significant pain discomfort.  Brisk cap refill disappeared sensation intact distally.  Internal and external rotation cause the patient's significant pain.       Assessment::  Primary osteoarthritis right hip    Plan:  Patient presents in regards to her right hip. She does have pain that is not persistent. She does get relief with ambulation and simple modalities. She would like to hold off on moving forward with surgery. She will call when needing to be seen for her hip. At that time we can either schedule another hip  injection or surgery. She states understanding and agrees with plan of care.    This note was created using "[employer] voice recognition software that occasionally misinterpreted phrases or words.    Consult note is delivered via Epic messaging service.

## 2024-08-13 NOTE — ED PROVIDER NOTES
Encounter Date: 8/13/2024       History     Chief Complaint   Patient presents with    Bleeding/Bruising     Noticed small bruise below right knee 3 days ago; saw NP in Dr. Mackey's office and discharged at 1245 today; states that she was sent to [facility], which sent her here for eval     83 y.o. White female with a history of leukemia (in remission), hypertension, and CKD presents to Emergency Department with a chief complaint of bruising to R knee. Symptoms began three days ago and have been constant since onset. Patient denies BT use or recent injury. Associated symptoms include R knee pain. Symptoms are aggravated with palpation and exertion and there are no alleviating factors. The patient denies CP, SOB, recent injury, vomiting, abdominal pain, or dizziness. Was seen at Dr. Mackey's office for hip pain prior to arrival. No other reported symptoms at this time      The history is provided by the patient. No  was used.   General Illness   The current episode started several days ago. The problem occurs continuously. The problem has been unchanged. Pertinent negatives include no fever, no photophobia, no abdominal pain, no nausea, no vomiting, no stridor, no cough, no shortness of breath, no URI and no wheezing. Recently, medical care has been given by a specialist.     Review of patient's allergies indicates:   Allergen Reactions    Latex Swelling    Benazepril      cough  cough      Crestor [rosuvastatin]     Amoxicillin-pot clavulanate      Patient does not recall reaction type  Patient does not recall reaction type      Ciprofloxacin Rash     Other reaction(s): Bactrim  Urticaria,hives  Urticaria,hives  Urticaria,hives  Urticaria,hives      Shellfish containing products Nausea Only    Sulfamethoxazole-trimethoprim Itching and Other (See Comments)     bleeding  Bleeding (C diff colitis)       Past Medical History:   Diagnosis Date    Arthritis     CKD (chronic kidney disease) stage 3, GFR  30-59 ml/min     Digestive disorder     Essential (primary) hypertension     Leukemia     in remission    MI (myocardial infarction)      Past Surgical History:   Procedure Laterality Date    APPENDECTOMY      BLADDER SURGERY      BONE MARROW BIOPSY W/ ASPIRATION      CATARACT EXTRACTION W/  INTRAOCULAR LENS IMPLANT Bilateral     COLONOSCOPY N/A 09/13/2023    Procedure: COLON;  Surgeon: Camilo Gordillo MD;  Location: [facility] ENDOSCOPY;  Service: Gastroenterology;  Laterality: N/A;  LATEX ALLERGY    CORONARY ANGIOPLASTY WITH STENT PLACEMENT      HYSTERECTOMY      INJECTION OF JOINT Right 6/12/2024    Procedure: Injection, Joint, Hip right hip injection with anesthesia;  Surgeon: Ismael Mackey MD;  Location: [facility] OR;  Service: Orthopedics;  Laterality: Right;  right hip injection with anesthesia    NECK SURGERY      SINUS SURGERY      x3    TONSILLECTOMY       Family History   Family history unknown: Yes     Social History     Tobacco Use    Smoking status: Never    Smokeless tobacco: Never   Substance Use Topics    Alcohol use: Yes     Comment: wine yearly    Drug use: Never     Review of Systems   Constitutional:  Negative for chills, fatigue and fever.   Eyes:  Negative for photophobia and visual disturbance.   Respiratory:  Negative for cough, shortness of breath, wheezing and stridor.    Cardiovascular:  Negative for palpitations and leg swelling.   Gastrointestinal:  Negative for abdominal pain, nausea and vomiting.   Musculoskeletal:  Positive for arthralgias. Negative for gait problem and joint swelling.   Skin:  Positive for color change. Negative for wound.   All other systems reviewed and are negative.      Physical Exam     Initial Vitals [08/13/24 1342]   BP Pulse Resp Temp SpO2   (!) 185/90 85 18 97.7 °F (36.5 °C) 98 %      MAP       --         Physical Exam    Nursing note and vitals reviewed.  Constitutional: She appears well-developed and well-nourished. She is not diaphoretic. She is  cooperative.  Non-toxic appearance. No distress.   HENT:   Head: Normocephalic and atraumatic.   Right Ear: External ear normal.   Left Ear: External ear normal.   Nose: Nose normal.   Eyes: Conjunctivae and EOM are normal. Pupils are equal, round, and reactive to light.   Neck: Neck supple.   Normal range of motion.  Cardiovascular:  Normal rate, regular rhythm, S1 normal, S2 normal, normal heart sounds, intact distal pulses and normal pulses.           Pulmonary/Chest: Effort normal and breath sounds normal. No tachypnea and no bradypnea. No respiratory distress. She has no decreased breath sounds. She has no wheezes. She has no rhonchi. She has no rales. She exhibits no tenderness.   Abdominal: Abdomen is soft. Bowel sounds are normal. She exhibits no distension. There is no abdominal tenderness. There is no guarding.   Musculoskeletal:         General: Tenderness present. Normal range of motion.      Cervical back: Normal range of motion and neck supple.      Right knee: Ecchymosis present. No swelling or deformity. Normal range of motion. Tenderness present. Normal pulse.      Left knee: Normal.      Comments: Tenderness and mild bruising noted to R knee. Full 5/5 ROM noted. CMS intact. All other adjacent joints otherwise normal.   No wounds or swelling noted.      Neurological: She is alert and oriented to person, place, and time. She has normal strength. GCS score is 15. GCS eye subscore is 4. GCS verbal subscore is 5. GCS motor subscore is 6.   Skin: Skin is warm and dry. Capillary refill takes less than 2 seconds.   Yellowed bruising noted to L FA. No wound, swelling, or tenderness noted.    Psychiatric: She has a normal mood and affect. Thought content normal.         ED Course   Procedures  Labs Reviewed   PROTIME-INR - Abnormal       Result Value    PT 12.5      INR 0.9 (*)    CBC WITH DIFFERENTIAL - Abnormal    WBC 5.46      RBC 4.59      Hgb 13.3      Hct 40.6      MCV 88.5      MCH 29.0      MCHC 32.8  (*)     RDW 14.4      Platelet 144      MPV 10.9 (*)     Neut % 68.0      Lymph % 25.1      Mono % 4.4      Eos % 1.6      Basophil % 0.7      Lymph # 1.37      Neut # 3.71      Mono # 0.24      Eos # 0.09      Baso # 0.04      IG# 0.01      IG% 0.2      NRBC% 0.0     APTT - Normal    PTT 26.6     CBC W/ AUTO DIFFERENTIAL    Narrative:     The following orders were created for panel order CBC auto differential.  Procedure                               Abnormality         Status                     ---------                               -----------         ------                     CBC with Differential[[id number]]       Abnormal            Final result                 Please view results for these tests on the individual orders.   COMPREHENSIVE METABOLIC PANEL          Imaging Results              X-Ray Knee Complete 4 Or More Views Right (Final result)  Result time 08/13/24 15:05:13      Final result by Svetlana Montalvo MD (08/13/24 15:05:13)                   Impression:      No acute bony abnormality.      Electronically signed by: Svetlana Montalvo  Date:    08/13/2024  Time:    15:05               Narrative:    EXAMINATION:  XR KNEE COMP 4 OR MORE VIEWS RIGHT    CLINICAL HISTORY:  Pain in unspecified knee    COMPARISON:  None.    FINDINGS:  There is no acute fracture or malalignment.  There are tiny tricompartmental osteophytes.  There is no knee joint effusion.                                       Medications - No data to display  Medical Decision Making  Patient awake, alert, has non-labored breathing, and follows commands appropriately. Arrived to ED due to atraumatic R knee pain and bruising. Symptoms began three days ago. Denies BT use. NAD Noted. Afebrile.         Differential Diagnosis: Knee Pain, Contusion, Hematoma, Bruising     Amount and/or Complexity of Data Reviewed  External Data Reviewed: notes.     Details: Patient seen at Dr. Mackey's office on today for R hip pain.   Labs: ordered.  Decision-making details documented in ED Course.     Details: Informed patient of results.   Radiology: ordered. Decision-making details documented in ED Course.     Details: Informed patient of results.   Discussion of management or test interpretation with external provider(s): Patient stable for discharge. At disposition, she is neurologically intact, has no additional complaints, and ambulatory. Although patient declines trauma to areas of bruising, there is a small suspicion this could be causing symptoms. Educated patient on safety to avoid further trauma. Discussed plan of care and interventions with patient. Agreed to and aware of plan of care. Comfortable being discharged home. Patient discharged home. Patient denies new or additional complaints; no further tests indicated at this time. Verbalized understanding of instructions. No emergent or apparent distress noted prior to discharge. To follow up with PCP in 1 week as needed. Strict ER return precautions given.       Risk  OTC drugs.               ED Course as of 08/13/24 1541   Tue Aug 13, 2024   1516 X-Ray Knee Complete 4 Or More Views Right  There is no acute fracture or malalignment.  There are tiny tricompartmental osteophytes.  There is no knee joint effusion. [JA]   1517 Hematocrit: 40.6 [JA]   1517 Hemoglobin: 13.3 [JA]   1534 INR(!): 0.9  Trending near patient's previous labs.  [JA]   1538 Patient's workup unremarkable on today. After evaluation, patient had yellowed bruising to L arm as well. Instructed patient to f/u with her PCP on tomorrow regarding bruising for outpatient treatment. Ace wrap applied to extremity. Patient reports she will take Tylenol at home for symptoms. Patient comfortable with plan of care and being discharged home. Strict ER return precautions given.  [JA]      ED Course User Index  [JA] Mehnaz Payne, NP                           Clinical Impression:  Final diagnoses:  [M25.569] Knee pain                 Elmer  Mehnaz HUSSEIN NP  08/13/24 1545

## 2024-10-26 ENCOUNTER — HOSPITAL ENCOUNTER (EMERGENCY)
Facility: HOSPITAL | Age: 84
Discharge: HOME | End: 2024-10-26
Attending: EMERGENCY MEDICINE
Payer: MEDICARE

## 2024-10-26 VITALS
WEIGHT: 197 LBS | OXYGEN SATURATION: 96 % | HEART RATE: 82 BPM | HEIGHT: 61 IN | SYSTOLIC BLOOD PRESSURE: 160 MMHG | RESPIRATION RATE: 18 BRPM | DIASTOLIC BLOOD PRESSURE: 76 MMHG | TEMPERATURE: 98 F | BODY MASS INDEX: 37.19 KG/M2

## 2024-10-26 DIAGNOSIS — R42 DIZZINESS: ICD-10-CM

## 2024-10-26 DIAGNOSIS — R07.9 CHEST PAIN: ICD-10-CM

## 2024-10-26 DIAGNOSIS — R07.9 CHEST PAIN, UNSPECIFIED TYPE: Primary | ICD-10-CM

## 2024-10-26 LAB
ALBUMIN SERPL-MCNC: 3.6 G/DL (ref 3.4–4.8)
ALBUMIN/GLOB SERPL: 1 RATIO (ref 1.1–2)
ALP SERPL-CCNC: 142 UNIT/L (ref 40–150)
ALT SERPL-CCNC: 22 UNIT/L (ref 0–55)
ANION GAP SERPL CALC-SCNC: 12 MEQ/L
AST SERPL-CCNC: 44 UNIT/L (ref 5–34)
BASOPHILS # BLD AUTO: 0.04 X10(3)/MCL
BASOPHILS NFR BLD AUTO: 0.8 %
BILIRUB SERPL-MCNC: 0.4 MG/DL
BNP BLD-MCNC: 36.7 PG/ML
BUN SERPL-MCNC: 15.3 MG/DL (ref 9.8–20.1)
CALCIUM SERPL-MCNC: 9.5 MG/DL (ref 8.4–10.2)
CHLORIDE SERPL-SCNC: 104 MMOL/L (ref 98–107)
CO2 SERPL-SCNC: 25 MMOL/L (ref 23–31)
CREAT SERPL-MCNC: 1.45 MG/DL (ref 0.55–1.02)
CREAT/UREA NIT SERPL: 11
EOSINOPHIL # BLD AUTO: 0.09 X10(3)/MCL (ref 0–0.9)
EOSINOPHIL NFR BLD AUTO: 1.8 %
ERYTHROCYTE [DISTWIDTH] IN BLOOD BY AUTOMATED COUNT: 13.9 % (ref 11.5–17)
GFR SERPLBLD CREATININE-BSD FMLA CKD-EPI: 36 ML/MIN/1.73/M2
GLOBULIN SER-MCNC: 3.7 GM/DL (ref 2.4–3.5)
GLUCOSE SERPL-MCNC: 94 MG/DL (ref 82–115)
HCT VFR BLD AUTO: 42.1 % (ref 37–47)
HGB BLD-MCNC: 13.8 G/DL (ref 12–16)
IMM GRANULOCYTES # BLD AUTO: 0.01 X10(3)/MCL (ref 0–0.04)
IMM GRANULOCYTES NFR BLD AUTO: 0.2 %
LYMPHOCYTES # BLD AUTO: 1.46 X10(3)/MCL (ref 0.6–4.6)
LYMPHOCYTES NFR BLD AUTO: 29 %
MCH RBC QN AUTO: 29 PG (ref 27–31)
MCHC RBC AUTO-ENTMCNC: 32.8 G/DL (ref 33–36)
MCV RBC AUTO: 88.4 FL (ref 80–94)
MONOCYTES # BLD AUTO: 0.34 X10(3)/MCL (ref 0.1–1.3)
MONOCYTES NFR BLD AUTO: 6.8 %
NEUTROPHILS # BLD AUTO: 3.09 X10(3)/MCL (ref 2.1–9.2)
NEUTROPHILS NFR BLD AUTO: 61.4 %
NRBC BLD AUTO-RTO: 0 %
PLATELET # BLD AUTO: 132 X10(3)/MCL (ref 130–400)
PLATELETS.RETICULATED NFR BLD AUTO: 4.1 % (ref 0.9–11.2)
PMV BLD AUTO: 11.4 FL (ref 7.4–10.4)
POTASSIUM SERPL-SCNC: 4.2 MMOL/L (ref 3.5–5.1)
PROT SERPL-MCNC: 7.3 GM/DL (ref 5.8–7.6)
RBC # BLD AUTO: 4.76 X10(6)/MCL (ref 4.2–5.4)
SODIUM SERPL-SCNC: 141 MMOL/L (ref 136–145)
TROPONIN I SERPL-MCNC: 0.01 NG/ML (ref 0–0.04)
TROPONIN I SERPL-MCNC: 0.02 NG/ML (ref 0–0.04)
WBC # BLD AUTO: 5.03 X10(3)/MCL (ref 4.5–11.5)

## 2024-10-26 PROCEDURE — 83880 ASSAY OF NATRIURETIC PEPTIDE: CPT | Performed by: EMERGENCY MEDICINE

## 2024-10-26 PROCEDURE — 93010 ELECTROCARDIOGRAM REPORT: CPT | Mod: ,,, | Performed by: STUDENT IN AN ORGANIZED HEALTH CARE EDUCATION/TRAINING PROGRAM

## 2024-10-26 PROCEDURE — 93005 ELECTROCARDIOGRAM TRACING: CPT

## 2024-10-26 PROCEDURE — 99285 EMERGENCY DEPT VISIT HI MDM: CPT | Mod: 25

## 2024-10-26 PROCEDURE — 84484 ASSAY OF TROPONIN QUANT: CPT | Mod: 91 | Performed by: EMERGENCY MEDICINE

## 2024-10-26 PROCEDURE — 85025 COMPLETE CBC W/AUTO DIFF WBC: CPT | Performed by: EMERGENCY MEDICINE

## 2024-10-26 PROCEDURE — 80053 COMPREHEN METABOLIC PANEL: CPT | Performed by: EMERGENCY MEDICINE

## 2024-10-28 LAB
OHS QRS DURATION: 86 MS
OHS QTC CALCULATION: 458 MS

## 2025-03-28 ENCOUNTER — HOSPITAL ENCOUNTER (INPATIENT)
Facility: HOSPITAL | Age: 85
LOS: 6 days | Discharge: HOME | DRG: 336 | End: 2025-04-04
Attending: EMERGENCY MEDICINE | Admitting: EMERGENCY MEDICINE
Payer: MEDICARE

## 2025-03-28 DIAGNOSIS — Z98.890 S/P LAPAROTOMY WITH LYSIS OF ADHESIONS: ICD-10-CM

## 2025-03-28 DIAGNOSIS — K56.2 VOLVULUS: Primary | ICD-10-CM

## 2025-03-28 DIAGNOSIS — R10.9 ABDOMINAL PAIN, UNSPECIFIED ABDOMINAL LOCATION: ICD-10-CM

## 2025-03-28 LAB
ALBUMIN SERPL-MCNC: 3.8 G/DL (ref 3.4–4.8)
ALBUMIN/GLOB SERPL: 1 RATIO (ref 1.1–2)
ALP SERPL-CCNC: 158 UNIT/L (ref 40–150)
ALT SERPL-CCNC: 15 UNIT/L (ref 0–55)
ANION GAP SERPL CALC-SCNC: 10 MEQ/L
AST SERPL-CCNC: 36 UNIT/L (ref 11–45)
BACTERIA #/AREA URNS AUTO: ABNORMAL /HPF
BASOPHILS # BLD AUTO: 0.05 X10(3)/MCL
BASOPHILS NFR BLD AUTO: 0.5 %
BILIRUB SERPL-MCNC: 0.5 MG/DL
BILIRUB UR QL STRIP.AUTO: NEGATIVE
BUN SERPL-MCNC: 13.8 MG/DL (ref 9.8–20.1)
CALCIUM SERPL-MCNC: 9.8 MG/DL (ref 8.4–10.2)
CHLORIDE SERPL-SCNC: 106 MMOL/L (ref 98–107)
CLARITY UR: CLEAR
CO2 SERPL-SCNC: 25 MMOL/L (ref 23–31)
COLOR UR AUTO: YELLOW
CREAT SERPL-MCNC: 1.23 MG/DL (ref 0.55–1.02)
CREAT/UREA NIT SERPL: 11
EOSINOPHIL # BLD AUTO: 0.12 X10(3)/MCL (ref 0–0.9)
EOSINOPHIL NFR BLD AUTO: 1.2 %
ERYTHROCYTE [DISTWIDTH] IN BLOOD BY AUTOMATED COUNT: 13.8 % (ref 11.5–17)
GFR SERPLBLD CREATININE-BSD FMLA CKD-EPI: 44 ML/MIN/1.73/M2
GLOBULIN SER-MCNC: 3.7 GM/DL (ref 2.4–3.5)
GLUCOSE SERPL-MCNC: 97 MG/DL (ref 82–115)
GLUCOSE UR QL STRIP: NEGATIVE
HCT VFR BLD AUTO: 45.8 % (ref 37–47)
HGB BLD-MCNC: 15.1 G/DL (ref 12–16)
HGB UR QL STRIP: ABNORMAL
IMM GRANULOCYTES # BLD AUTO: 0.01 X10(3)/MCL (ref 0–0.04)
IMM GRANULOCYTES NFR BLD AUTO: 0.1 %
KETONES UR QL STRIP: NEGATIVE
LACTATE SERPL-SCNC: 1 MMOL/L (ref 0.5–2.2)
LEUKOCYTE ESTERASE UR QL STRIP: NEGATIVE
LIPASE SERPL-CCNC: 82 U/L
LYMPHOCYTES # BLD AUTO: 2.79 X10(3)/MCL (ref 0.6–4.6)
LYMPHOCYTES NFR BLD AUTO: 28.1 %
MCH RBC QN AUTO: 29.7 PG (ref 27–31)
MCHC RBC AUTO-ENTMCNC: 33 G/DL (ref 33–36)
MCV RBC AUTO: 90.2 FL (ref 80–94)
MONOCYTES # BLD AUTO: 0.58 X10(3)/MCL (ref 0.1–1.3)
MONOCYTES NFR BLD AUTO: 5.8 %
NEUTROPHILS # BLD AUTO: 6.38 X10(3)/MCL (ref 2.1–9.2)
NEUTROPHILS NFR BLD AUTO: 64.3 %
NITRITE UR QL STRIP: NEGATIVE
NRBC BLD AUTO-RTO: 0 %
PH UR STRIP: 7.5 [PH]
PLATELET # BLD AUTO: 167 X10(3)/MCL (ref 130–400)
PMV BLD AUTO: 10.5 FL (ref 7.4–10.4)
POTASSIUM SERPL-SCNC: 4.1 MMOL/L (ref 3.5–5.1)
PROT SERPL-MCNC: 7.5 GM/DL (ref 5.8–7.6)
PROT UR QL STRIP: 100
RBC # BLD AUTO: 5.08 X10(6)/MCL (ref 4.2–5.4)
RBC #/AREA URNS AUTO: ABNORMAL /HPF
SODIUM SERPL-SCNC: 141 MMOL/L (ref 136–145)
SP GR UR STRIP.AUTO: 1.01 (ref 1–1.03)
SQUAMOUS #/AREA URNS AUTO: ABNORMAL /HPF
UROBILINOGEN UR STRIP-ACNC: 0.2
WBC # BLD AUTO: 9.93 X10(3)/MCL (ref 4.5–11.5)
WBC #/AREA URNS AUTO: ABNORMAL /HPF

## 2025-03-28 PROCEDURE — 81003 URINALYSIS AUTO W/O SCOPE: CPT | Performed by: EMERGENCY MEDICINE

## 2025-03-28 PROCEDURE — 80053 COMPREHEN METABOLIC PANEL: CPT | Performed by: EMERGENCY MEDICINE

## 2025-03-28 PROCEDURE — 83690 ASSAY OF LIPASE: CPT | Performed by: EMERGENCY MEDICINE

## 2025-03-28 PROCEDURE — 63600175 PHARM REV CODE 636 W HCPCS: Performed by: EMERGENCY MEDICINE

## 2025-03-28 PROCEDURE — 96375 TX/PRO/DX INJ NEW DRUG ADDON: CPT

## 2025-03-28 PROCEDURE — 96374 THER/PROPH/DIAG INJ IV PUSH: CPT

## 2025-03-28 PROCEDURE — 85025 COMPLETE CBC W/AUTO DIFF WBC: CPT | Performed by: EMERGENCY MEDICINE

## 2025-03-28 PROCEDURE — 83605 ASSAY OF LACTIC ACID: CPT | Performed by: EMERGENCY MEDICINE

## 2025-03-28 PROCEDURE — 99285 EMERGENCY DEPT VISIT HI MDM: CPT

## 2025-03-28 RX ORDER — MORPHINE SULFATE 4 MG/ML
2 INJECTION, SOLUTION INTRAMUSCULAR; INTRAVENOUS
Refills: 0 | Status: COMPLETED | OUTPATIENT
Start: 2025-03-28 | End: 2025-03-28

## 2025-03-28 RX ORDER — ONDANSETRON HYDROCHLORIDE 2 MG/ML
4 INJECTION, SOLUTION INTRAVENOUS
Status: COMPLETED | OUTPATIENT
Start: 2025-03-28 | End: 2025-03-28

## 2025-03-28 RX ADMIN — MORPHINE SULFATE 2 MG: 4 INJECTION INTRAVENOUS at 10:03

## 2025-03-28 RX ADMIN — ONDANSETRON 4 MG: 2 INJECTION INTRAMUSCULAR; INTRAVENOUS at 10:03

## 2025-03-28 RX ADMIN — SODIUM CHLORIDE, POTASSIUM CHLORIDE, SODIUM LACTATE AND CALCIUM CHLORIDE 1000 ML: 600; 310; 30; 20 INJECTION, SOLUTION INTRAVENOUS at 10:03

## 2025-03-29 ENCOUNTER — ANESTHESIA EVENT (OUTPATIENT)
Dept: SURGERY | Facility: HOSPITAL | Age: 85
End: 2025-03-29
Payer: MEDICARE

## 2025-03-29 ENCOUNTER — ANESTHESIA (OUTPATIENT)
Dept: SURGERY | Facility: HOSPITAL | Age: 85
End: 2025-03-29
Payer: MEDICARE

## 2025-03-29 LAB
ABORH RETYPE: NORMAL
AFP-TM SERPL-MCNC: 3.2 NG/ML
ANION GAP SERPL CALC-SCNC: 11 MEQ/L
BASOPHILS # BLD AUTO: 0.04 X10(3)/MCL
BASOPHILS NFR BLD AUTO: 0.6 %
BUN SERPL-MCNC: 13.3 MG/DL (ref 9.8–20.1)
CALCIUM SERPL-MCNC: 8.6 MG/DL (ref 8.4–10.2)
CANCER AG125 SERPL-ACNC: 7.4 UNIT/ML (ref 0–35)
CANCER AG19-9 SERPL-ACNC: <2.06 UNIT/ML (ref 0–37)
CEA SERPL-MCNC: <1.73 NG/ML (ref 0–3)
CHLORIDE SERPL-SCNC: 106 MMOL/L (ref 98–107)
CO2 SERPL-SCNC: 24 MMOL/L (ref 23–31)
CREAT SERPL-MCNC: 1.07 MG/DL (ref 0.55–1.02)
CREAT/UREA NIT SERPL: 12
EOSINOPHIL # BLD AUTO: 0.06 X10(3)/MCL (ref 0–0.9)
EOSINOPHIL NFR BLD AUTO: 0.9 %
ERYTHROCYTE [DISTWIDTH] IN BLOOD BY AUTOMATED COUNT: 14.1 % (ref 11.5–17)
GFR SERPLBLD CREATININE-BSD FMLA CKD-EPI: 52 ML/MIN/1.73/M2
GLUCOSE SERPL-MCNC: 80 MG/DL (ref 82–115)
GROUP & RH: NORMAL
HCT VFR BLD AUTO: 42.3 % (ref 37–47)
HGB BLD-MCNC: 13.5 G/DL (ref 12–16)
IMM GRANULOCYTES # BLD AUTO: 0.02 X10(3)/MCL (ref 0–0.04)
IMM GRANULOCYTES NFR BLD AUTO: 0.3 %
INDIRECT COOMBS: NORMAL
LYMPHOCYTES # BLD AUTO: 1.4 X10(3)/MCL (ref 0.6–4.6)
LYMPHOCYTES NFR BLD AUTO: 20.3 %
MCH RBC QN AUTO: 28.8 PG (ref 27–31)
MCHC RBC AUTO-ENTMCNC: 31.9 G/DL (ref 33–36)
MCV RBC AUTO: 90.4 FL (ref 80–94)
MONOCYTES # BLD AUTO: 0.5 X10(3)/MCL (ref 0.1–1.3)
MONOCYTES NFR BLD AUTO: 7.3 %
NEUTROPHILS # BLD AUTO: 4.87 X10(3)/MCL (ref 2.1–9.2)
NEUTROPHILS NFR BLD AUTO: 70.6 %
NRBC BLD AUTO-RTO: 0 %
PLATELET # BLD AUTO: 143 X10(3)/MCL (ref 130–400)
PMV BLD AUTO: 11.7 FL (ref 7.4–10.4)
POTASSIUM SERPL-SCNC: 3.9 MMOL/L (ref 3.5–5.1)
RBC # BLD AUTO: 4.68 X10(6)/MCL (ref 4.2–5.4)
SODIUM SERPL-SCNC: 141 MMOL/L (ref 136–145)
SPECIMEN OUTDATE: NORMAL
WBC # BLD AUTO: 6.89 X10(3)/MCL (ref 4.5–11.5)

## 2025-03-29 PROCEDURE — 36415 COLL VENOUS BLD VENIPUNCTURE: CPT

## 2025-03-29 PROCEDURE — 36415 COLL VENOUS BLD VENIPUNCTURE: CPT | Performed by: SURGERY

## 2025-03-29 PROCEDURE — 80048 BASIC METABOLIC PNL TOTAL CA: CPT | Performed by: STUDENT IN AN ORGANIZED HEALTH CARE EDUCATION/TRAINING PROGRAM

## 2025-03-29 PROCEDURE — 82378 CARCINOEMBRYONIC ANTIGEN: CPT

## 2025-03-29 PROCEDURE — 86304 IMMUNOASSAY TUMOR CA 125: CPT

## 2025-03-29 PROCEDURE — 63600175 PHARM REV CODE 636 W HCPCS: Performed by: SURGERY

## 2025-03-29 PROCEDURE — 71000033 HC RECOVERY, INTIAL HOUR: Performed by: SURGERY

## 2025-03-29 PROCEDURE — 0DNW4ZZ RELEASE PERITONEUM, PERCUTANEOUS ENDOSCOPIC APPROACH: ICD-10-PCS | Performed by: SURGERY

## 2025-03-29 PROCEDURE — 63600175 PHARM REV CODE 636 W HCPCS: Performed by: STUDENT IN AN ORGANIZED HEALTH CARE EDUCATION/TRAINING PROGRAM

## 2025-03-29 PROCEDURE — 11000001 HC ACUTE MED/SURG PRIVATE ROOM

## 2025-03-29 PROCEDURE — 25000003 PHARM REV CODE 250: Performed by: EMERGENCY MEDICINE

## 2025-03-29 PROCEDURE — 63600175 PHARM REV CODE 636 W HCPCS

## 2025-03-29 PROCEDURE — 36415 COLL VENOUS BLD VENIPUNCTURE: CPT | Performed by: STUDENT IN AN ORGANIZED HEALTH CARE EDUCATION/TRAINING PROGRAM

## 2025-03-29 PROCEDURE — 25000003 PHARM REV CODE 250

## 2025-03-29 PROCEDURE — 86301 IMMUNOASSAY TUMOR CA 19-9: CPT

## 2025-03-29 PROCEDURE — 25500020 PHARM REV CODE 255: Performed by: EMERGENCY MEDICINE

## 2025-03-29 PROCEDURE — 0DN84ZZ RELEASE SMALL INTESTINE, PERCUTANEOUS ENDOSCOPIC APPROACH: ICD-10-PCS | Performed by: SURGERY

## 2025-03-29 PROCEDURE — 25000003 PHARM REV CODE 250: Performed by: SURGERY

## 2025-03-29 PROCEDURE — 63600175 PHARM REV CODE 636 W HCPCS: Performed by: EMERGENCY MEDICINE

## 2025-03-29 PROCEDURE — D9220A PRA ANESTHESIA: Mod: ANES,,, | Performed by: STUDENT IN AN ORGANIZED HEALTH CARE EDUCATION/TRAINING PROGRAM

## 2025-03-29 PROCEDURE — 36000708 HC OR TIME LEV III 1ST 15 MIN: Performed by: SURGERY

## 2025-03-29 PROCEDURE — 63600175 PHARM REV CODE 636 W HCPCS: Mod: JZ,TB

## 2025-03-29 PROCEDURE — 0DN80ZZ RELEASE SMALL INTESTINE, OPEN APPROACH: ICD-10-PCS | Performed by: SURGERY

## 2025-03-29 PROCEDURE — 82105 ALPHA-FETOPROTEIN SERUM: CPT

## 2025-03-29 PROCEDURE — 36000709 HC OR TIME LEV III EA ADD 15 MIN: Performed by: SURGERY

## 2025-03-29 PROCEDURE — 37000008 HC ANESTHESIA 1ST 15 MINUTES: Performed by: SURGERY

## 2025-03-29 PROCEDURE — 85025 COMPLETE CBC W/AUTO DIFF WBC: CPT | Performed by: STUDENT IN AN ORGANIZED HEALTH CARE EDUCATION/TRAINING PROGRAM

## 2025-03-29 PROCEDURE — 25000003 PHARM REV CODE 250: Performed by: STUDENT IN AN ORGANIZED HEALTH CARE EDUCATION/TRAINING PROGRAM

## 2025-03-29 PROCEDURE — 0WQF4ZZ REPAIR ABDOMINAL WALL, PERCUTANEOUS ENDOSCOPIC APPROACH: ICD-10-PCS | Performed by: SURGERY

## 2025-03-29 PROCEDURE — 96375 TX/PRO/DX INJ NEW DRUG ADDON: CPT

## 2025-03-29 PROCEDURE — D9220A PRA ANESTHESIA: Mod: CRNA,,,

## 2025-03-29 PROCEDURE — 96376 TX/PRO/DX INJ SAME DRUG ADON: CPT

## 2025-03-29 PROCEDURE — 27201423 OPTIME MED/SURG SUP & DEVICES STERILE SUPPLY: Performed by: SURGERY

## 2025-03-29 PROCEDURE — 37000009 HC ANESTHESIA EA ADD 15 MINS: Performed by: SURGERY

## 2025-03-29 PROCEDURE — 63600175 PHARM REV CODE 636 W HCPCS: Mod: JZ,TB | Performed by: STUDENT IN AN ORGANIZED HEALTH CARE EDUCATION/TRAINING PROGRAM

## 2025-03-29 PROCEDURE — 86900 BLOOD TYPING SEROLOGIC ABO: CPT | Performed by: SURGERY

## 2025-03-29 RX ORDER — ACETAMINOPHEN 10 MG/ML
INJECTION, SOLUTION INTRAVENOUS
Status: DISCONTINUED | OUTPATIENT
Start: 2025-03-29 | End: 2025-03-29

## 2025-03-29 RX ORDER — ACETAMINOPHEN 10 MG/ML
1000 INJECTION, SOLUTION INTRAVENOUS EVERY 8 HOURS
Status: DISPENSED | OUTPATIENT
Start: 2025-03-29 | End: 2025-03-30

## 2025-03-29 RX ORDER — CEFAZOLIN SODIUM 1 G/3ML
INJECTION, POWDER, FOR SOLUTION INTRAMUSCULAR; INTRAVENOUS
Status: DISCONTINUED | OUTPATIENT
Start: 2025-03-29 | End: 2025-03-29

## 2025-03-29 RX ORDER — OXYCODONE HYDROCHLORIDE 5 MG/1
10 TABLET ORAL EVERY 4 HOURS PRN
Refills: 0 | Status: DISCONTINUED | OUTPATIENT
Start: 2025-03-29 | End: 2025-03-29

## 2025-03-29 RX ORDER — LIDOCAINE HYDROCHLORIDE 20 MG/ML
INJECTION, SOLUTION EPIDURAL; INFILTRATION; INTRACAUDAL; PERINEURAL
Status: DISCONTINUED | OUTPATIENT
Start: 2025-03-29 | End: 2025-03-29

## 2025-03-29 RX ORDER — SODIUM CHLORIDE, SODIUM LACTATE, POTASSIUM CHLORIDE, CALCIUM CHLORIDE 600; 310; 30; 20 MG/100ML; MG/100ML; MG/100ML; MG/100ML
INJECTION, SOLUTION INTRAVENOUS CONTINUOUS
Status: DISCONTINUED | OUTPATIENT
Start: 2025-03-29 | End: 2025-04-03

## 2025-03-29 RX ORDER — LIDOCAINE HYDROCHLORIDE 10 MG/ML
1 INJECTION, SOLUTION INFILTRATION; PERINEURAL ONCE AS NEEDED
Status: DISCONTINUED | OUTPATIENT
Start: 2025-03-29 | End: 2025-04-04 | Stop reason: HOSPADM

## 2025-03-29 RX ORDER — LABETALOL HYDROCHLORIDE 5 MG/ML
10 INJECTION, SOLUTION INTRAVENOUS
Status: COMPLETED | OUTPATIENT
Start: 2025-03-29 | End: 2025-03-29

## 2025-03-29 RX ORDER — ONDANSETRON HYDROCHLORIDE 2 MG/ML
INJECTION, SOLUTION INTRAVENOUS
Status: DISCONTINUED | OUTPATIENT
Start: 2025-03-29 | End: 2025-03-29

## 2025-03-29 RX ORDER — SODIUM CHLORIDE 9 MG/ML
1000 INJECTION, SOLUTION INTRAVENOUS
Status: COMPLETED | OUTPATIENT
Start: 2025-03-29 | End: 2025-03-29

## 2025-03-29 RX ORDER — ACETAMINOPHEN 325 MG/1
650 TABLET ORAL EVERY 4 HOURS PRN
Status: DISCONTINUED | OUTPATIENT
Start: 2025-03-29 | End: 2025-03-29

## 2025-03-29 RX ORDER — HYDROMORPHONE HYDROCHLORIDE 2 MG/ML
0.2 INJECTION, SOLUTION INTRAMUSCULAR; INTRAVENOUS; SUBCUTANEOUS EVERY 5 MIN PRN
Status: DISCONTINUED | OUTPATIENT
Start: 2025-03-29 | End: 2025-03-29 | Stop reason: HOSPADM

## 2025-03-29 RX ORDER — BUPIVACAINE HYDROCHLORIDE 2.5 MG/ML
INJECTION, SOLUTION EPIDURAL; INFILTRATION; INTRACAUDAL; PERINEURAL
Status: DISCONTINUED | OUTPATIENT
Start: 2025-03-29 | End: 2025-03-29 | Stop reason: HOSPADM

## 2025-03-29 RX ORDER — SUCCINYLCHOLINE CHLORIDE 20 MG/ML
INJECTION INTRAMUSCULAR; INTRAVENOUS
Status: DISCONTINUED | OUTPATIENT
Start: 2025-03-29 | End: 2025-03-29

## 2025-03-29 RX ORDER — PHENYLEPHRINE HYDROCHLORIDE 10 MG/ML
INJECTION INTRAVENOUS
Status: DISCONTINUED | OUTPATIENT
Start: 2025-03-29 | End: 2025-03-29

## 2025-03-29 RX ORDER — SODIUM CHLORIDE 0.9 % (FLUSH) 0.9 %
10 SYRINGE (ML) INJECTION
Status: DISCONTINUED | OUTPATIENT
Start: 2025-03-29 | End: 2025-04-04 | Stop reason: HOSPADM

## 2025-03-29 RX ORDER — MUPIROCIN 20 MG/G
OINTMENT TOPICAL 2 TIMES DAILY
Status: COMPLETED | OUTPATIENT
Start: 2025-03-29 | End: 2025-04-03

## 2025-03-29 RX ORDER — HYDROMORPHONE HYDROCHLORIDE 2 MG/ML
0.2 INJECTION, SOLUTION INTRAMUSCULAR; INTRAVENOUS; SUBCUTANEOUS EVERY 6 HOURS PRN
Status: DISCONTINUED | OUTPATIENT
Start: 2025-03-29 | End: 2025-03-30

## 2025-03-29 RX ORDER — CARVEDILOL 12.5 MG/1
12.5 TABLET ORAL 2 TIMES DAILY
Status: DISCONTINUED | OUTPATIENT
Start: 2025-03-29 | End: 2025-04-04 | Stop reason: HOSPADM

## 2025-03-29 RX ORDER — HYDROMORPHONE HYDROCHLORIDE 2 MG/ML
0.5 INJECTION, SOLUTION INTRAMUSCULAR; INTRAVENOUS; SUBCUTANEOUS EVERY 6 HOURS PRN
Status: DISCONTINUED | OUTPATIENT
Start: 2025-03-29 | End: 2025-03-30

## 2025-03-29 RX ORDER — ONDANSETRON HYDROCHLORIDE 2 MG/ML
4 INJECTION, SOLUTION INTRAVENOUS DAILY PRN
Status: DISCONTINUED | OUTPATIENT
Start: 2025-03-29 | End: 2025-03-29 | Stop reason: HOSPADM

## 2025-03-29 RX ORDER — SODIUM CHLORIDE 0.9 % (FLUSH) 0.9 %
10 SYRINGE (ML) INJECTION
Status: DISCONTINUED | OUTPATIENT
Start: 2025-03-29 | End: 2025-03-29 | Stop reason: HOSPADM

## 2025-03-29 RX ORDER — MORPHINE SULFATE 4 MG/ML
2 INJECTION, SOLUTION INTRAMUSCULAR; INTRAVENOUS
Status: COMPLETED | OUTPATIENT
Start: 2025-03-29 | End: 2025-03-29

## 2025-03-29 RX ORDER — PROPOFOL 10 MG/ML
VIAL (ML) INTRAVENOUS
Status: DISCONTINUED | OUTPATIENT
Start: 2025-03-29 | End: 2025-03-29

## 2025-03-29 RX ORDER — TALC
6 POWDER (GRAM) TOPICAL NIGHTLY PRN
Status: DISCONTINUED | OUTPATIENT
Start: 2025-03-29 | End: 2025-03-29

## 2025-03-29 RX ORDER — ONDANSETRON 4 MG/1
8 TABLET, ORALLY DISINTEGRATING ORAL EVERY 8 HOURS PRN
Status: DISCONTINUED | OUTPATIENT
Start: 2025-03-29 | End: 2025-03-29

## 2025-03-29 RX ORDER — ACETAMINOPHEN 325 MG/1
650 TABLET ORAL EVERY 8 HOURS PRN
Status: DISCONTINUED | OUTPATIENT
Start: 2025-03-29 | End: 2025-03-29

## 2025-03-29 RX ORDER — FENTANYL CITRATE 50 UG/ML
INJECTION, SOLUTION INTRAMUSCULAR; INTRAVENOUS
Status: DISCONTINUED | OUTPATIENT
Start: 2025-03-29 | End: 2025-03-29

## 2025-03-29 RX ORDER — MORPHINE SULFATE 4 MG/ML
4 INJECTION, SOLUTION INTRAMUSCULAR; INTRAVENOUS
Refills: 0 | Status: DISCONTINUED | OUTPATIENT
Start: 2025-03-29 | End: 2025-03-29

## 2025-03-29 RX ORDER — ROCURONIUM BROMIDE 10 MG/ML
INJECTION, SOLUTION INTRAVENOUS
Status: DISCONTINUED | OUTPATIENT
Start: 2025-03-29 | End: 2025-03-29

## 2025-03-29 RX ORDER — GLUCAGON 1 MG
1 KIT INJECTION
Status: DISCONTINUED | OUTPATIENT
Start: 2025-03-29 | End: 2025-03-29 | Stop reason: HOSPADM

## 2025-03-29 RX ORDER — CEFAZOLIN SODIUM 2 G/50ML
2 SOLUTION INTRAVENOUS ONCE
Status: DISCONTINUED | OUTPATIENT
Start: 2025-03-29 | End: 2025-03-29 | Stop reason: HOSPADM

## 2025-03-29 RX ORDER — OXYCODONE HYDROCHLORIDE 5 MG/1
5 TABLET ORAL EVERY 4 HOURS PRN
Refills: 0 | Status: DISCONTINUED | OUTPATIENT
Start: 2025-03-29 | End: 2025-03-29

## 2025-03-29 RX ADMIN — HYDROMORPHONE HYDROCHLORIDE 0.2 MG: 2 INJECTION, SOLUTION INTRAMUSCULAR; INTRAVENOUS; SUBCUTANEOUS at 02:03

## 2025-03-29 RX ADMIN — SODIUM CHLORIDE, SODIUM GLUCONATE, SODIUM ACETATE, POTASSIUM CHLORIDE AND MAGNESIUM CHLORIDE: 526; 502; 368; 37; 30 INJECTION, SOLUTION INTRAVENOUS at 10:03

## 2025-03-29 RX ADMIN — SODIUM CHLORIDE 1000 ML: 9 INJECTION, SOLUTION INTRAVENOUS at 01:03

## 2025-03-29 RX ADMIN — CARVEDILOL 12.5 MG: 12.5 TABLET, FILM COATED ORAL at 09:03

## 2025-03-29 RX ADMIN — PROPOFOL 120 MG: 10 INJECTION, EMULSION INTRAVENOUS at 10:03

## 2025-03-29 RX ADMIN — SUGAMMADEX 200 MG: 100 INJECTION, SOLUTION INTRAVENOUS at 01:03

## 2025-03-29 RX ADMIN — SODIUM CHLORIDE, POTASSIUM CHLORIDE, SODIUM LACTATE AND CALCIUM CHLORIDE: 600; 310; 30; 20 INJECTION, SOLUTION INTRAVENOUS at 02:03

## 2025-03-29 RX ADMIN — ROCURONIUM BROMIDE 45 MG: 10 SOLUTION INTRAVENOUS at 11:03

## 2025-03-29 RX ADMIN — IOHEXOL 100 ML: 350 INJECTION, SOLUTION INTRAVENOUS at 12:03

## 2025-03-29 RX ADMIN — CEFAZOLIN 2 G: 330 INJECTION, POWDER, FOR SOLUTION INTRAMUSCULAR; INTRAVENOUS at 11:03

## 2025-03-29 RX ADMIN — LIDOCAINE HYDROCHLORIDE 100 MG: 20 INJECTION, SOLUTION INTRAVENOUS at 10:03

## 2025-03-29 RX ADMIN — ROCURONIUM BROMIDE 20 MG: 10 SOLUTION INTRAVENOUS at 11:03

## 2025-03-29 RX ADMIN — FENTANYL CITRATE 100 MCG: 50 INJECTION, SOLUTION INTRAMUSCULAR; INTRAVENOUS at 10:03

## 2025-03-29 RX ADMIN — SODIUM CHLORIDE, POTASSIUM CHLORIDE, SODIUM LACTATE AND CALCIUM CHLORIDE: 600; 310; 30; 20 INJECTION, SOLUTION INTRAVENOUS at 05:03

## 2025-03-29 RX ADMIN — MUPIROCIN: 20 OINTMENT TOPICAL at 10:03

## 2025-03-29 RX ADMIN — ACETAMINOPHEN 1000 MG: 10 INJECTION INTRAVENOUS at 09:03

## 2025-03-29 RX ADMIN — HYPROMELLOSE 2910 1 DROP: 5 SOLUTION/ DROPS OPHTHALMIC at 06:03

## 2025-03-29 RX ADMIN — MORPHINE SULFATE 2 MG: 4 INJECTION INTRAVENOUS at 01:03

## 2025-03-29 RX ADMIN — PHENYLEPHRINE HYDROCHLORIDE 100 MCG: 10 INJECTION INTRAVENOUS at 11:03

## 2025-03-29 RX ADMIN — ONDANSETRON 4 MG: 2 INJECTION INTRAMUSCULAR; INTRAVENOUS at 01:03

## 2025-03-29 RX ADMIN — HYDROMORPHONE HYDROCHLORIDE 0.5 MG: 2 INJECTION, SOLUTION INTRAMUSCULAR; INTRAVENOUS; SUBCUTANEOUS at 05:03

## 2025-03-29 RX ADMIN — SUCCINYLCHOLINE CHLORIDE 120 MG: 20 INJECTION, SOLUTION INTRAMUSCULAR; INTRAVENOUS at 10:03

## 2025-03-29 RX ADMIN — ROCURONIUM BROMIDE 5 MG: 10 SOLUTION INTRAVENOUS at 10:03

## 2025-03-29 RX ADMIN — ACETAMINOPHEN 1000 MG: 10 INJECTION, SOLUTION INTRAVENOUS at 01:03

## 2025-03-29 RX ADMIN — LABETALOL HYDROCHLORIDE 10 MG: 5 INJECTION, SOLUTION INTRAVENOUS at 01:03

## 2025-03-29 NOTE — TRANSFER OF CARE
"Anesthesia Transfer of Care Note    Patient: Nell Mock    Procedure(s) Performed: Procedure(s) (LRB):  LAPAROSCOPY, DIAGNOSTIC (N/A)    Patient location: PACU    Anesthesia Type: general    Transport from OR: Transported from OR on room air with adequate spontaneous ventilation    Post pain: adequate analgesia    Post assessment: no apparent anesthetic complications    Post vital signs: stable    Level of consciousness: awake    Nausea/Vomiting: no nausea/vomiting    Complications: none    Transfer of care protocol was followed    Last vitals: Visit Vitals  BP (!) 163/64   Pulse 63   Temp 36.6 °C (97.9 °F) (Oral)   Resp 19   Ht 5' 1" (1.549 m)   Wt 88.5 kg (195 lb)   SpO2 (!) 93%   Breastfeeding No   BMI 36.84 kg/m²     "

## 2025-03-29 NOTE — ANESTHESIA POSTPROCEDURE EVALUATION
Anesthesia Post Evaluation    Patient: Nell Mock    Procedure(s) Performed: Procedure(s) (LRB):  LAPAROSCOPY, DIAGNOSTIC (N/A)    Final Anesthesia Type: general      Patient location during evaluation: PACU  Patient participation: Yes- Able to Participate  Level of consciousness: awake and alert  Post-procedure vital signs: reviewed and stable  Pain management: adequate  Airway patency: patent    PONV status at discharge: No PONV  Anesthetic complications: no      Respiratory status: unassisted  Hydration status: euvolemic  Follow-up not needed.              Vitals Value Taken Time   /68 03/29/25 14:53   Temp Nl   03/29/25 16:45   Pulse 63 03/29/25 14:53   Resp 16 03/29/25 14:43   SpO2 97 % 03/29/25 14:53   Vitals shown include unfiled device data.      Event Time   Out of Recovery 03/29/2025 14:42:50         Pain/Mak Score: Pain Rating Prior to Med Admin: 7 (3/29/2025  2:40 PM)  Mak Score: 8 (3/29/2025  2:22 PM)

## 2025-03-29 NOTE — ED PROVIDER NOTES
Encounter Date: 3/28/2025       History     Chief Complaint   Patient presents with    Abdominal Pain     My whole stomach hurts on and off since Wednesday but all day today with nausea     84 [facility].  Patient states pain started intermittently on Wednesday starting yesterday morning has been continuous.  Aching and diffuse mostly periumbilical.  She has had a history of multiple previous surgeries she reports no surgeries in the last 10 years surgery started in the 80s and 90s.  She had a cm a recall on removed due to a ruptured diverticulitis she has had appendectomy cholecystectomy hysterectomy bladder mesh surgery.  She has had some nausea without vomiting.  States her last bowel movement was yesterday morning was very small.        Review of patient's allergies indicates:   Allergen Reactions    Latex Swelling    Benazepril      cough  cough      Crestor [rosuvastatin]     Amoxicillin-pot clavulanate      Patient does not recall reaction type  Patient does not recall reaction type      Ciprofloxacin Rash     Other reaction(s): Bactrim  Urticaria,hives  Urticaria,hives  Urticaria,hives  Urticaria,hives      Shellfish containing products Nausea Only    Sulfamethoxazole-trimethoprim Itching and Other (See Comments)     bleeding  Bleeding (C diff colitis)       Past Medical History:   Diagnosis Date    Arthritis     CKD (chronic kidney disease) stage 3, GFR 30-59 ml/min     Digestive disorder     Essential (primary) hypertension     Leukemia     in remission    MI (myocardial infarction)      Past Surgical History:   Procedure Laterality Date    APPENDECTOMY      BLADDER SURGERY      BONE MARROW BIOPSY W/ ASPIRATION      CATARACT EXTRACTION W/  INTRAOCULAR LENS IMPLANT Bilateral     COLONOSCOPY N/A 09/13/2023    Procedure: COLON;  Surgeon: Camilo Gordillo MD;  Location: [facility] ENDOSCOPY;  Service: Gastroenterology;  Laterality: N/A;  LATEX ALLERGY    CORONARY  ANGIOPLASTY WITH STENT PLACEMENT      HYSTERECTOMY      INJECTION OF JOINT Right 6/12/2024    Procedure: Injection, Joint, Hip right hip injection with anesthesia;  Surgeon: Ismael Mackey MD;  Location: [facility] OR;  Service: Orthopedics;  Laterality: Right;  right hip injection with anesthesia    NECK SURGERY      SINUS SURGERY      x3    TONSILLECTOMY       Family History   Family history unknown: Yes     Social History[1]  Review of Systems   Constitutional:  Negative for chills and fever.   Respiratory:  Negative for cough and shortness of breath.    Cardiovascular:  Negative for chest pain.   Gastrointestinal:  Positive for abdominal pain and nausea. Negative for vomiting.   All other systems reviewed and are negative.      Physical Exam     Initial Vitals [03/28/25 2144]   BP Pulse Resp Temp SpO2   (!) 195/83 85 20 98.3 °F (36.8 °C) 97 %      MAP       --         Physical Exam    Nursing note and vitals reviewed.  Constitutional: She appears well-developed and well-nourished. No distress.   HENT:   Head: Normocephalic and atraumatic.   Eyes: Conjunctivae are normal.   Cardiovascular:  Normal rate and intact distal pulses.           Pulmonary/Chest: No respiratory distress. She has no rhonchi.   Abdominal: Abdomen is soft. Bowel sounds are normal. There is abdominal tenderness.   Bowel sounds are present.  Just tenderness and guarding periumbilical and a in the upper quadrants mild tenderness right lower quadrant There is guarding. There is no rebound.   Musculoskeletal:         General: No edema.     Neurological: She is alert. She has normal strength.   Skin: Skin is warm and dry.   Psychiatric: She has a normal mood and affect.         ED Course   Procedures  Labs Reviewed   URINALYSIS, REFLEX TO URINE CULTURE - Abnormal       Result Value    Color, UA Yellow      Appearance, UA Clear      Specific Gravity, UA 1.015      pH, UA 7.5      Protein,  (*)     Glucose, UA Negative      Ketones, UA Negative       Blood, UA Trace (*)     Bilirubin, UA Negative      Urobilinogen, UA 0.2      Nitrites, UA Negative      Leukocyte Esterase, UA Negative     COMPREHENSIVE METABOLIC PANEL - Abnormal    Sodium 141      Potassium 4.1      Chloride 106      CO2 25      Glucose 97      Blood Urea Nitrogen 13.8      Creatinine 1.23 (*)     Calcium 9.8      Protein Total 7.5      Albumin 3.8      Globulin 3.7 (*)     Albumin/Globulin Ratio 1.0 (*)     Bilirubin Total 0.5       (*)     ALT 15      AST 36      eGFR 44      Anion Gap 10.0      BUN/Creatinine Ratio 11     LIPASE - Abnormal    Lipase Level 82 (*)    CBC WITH DIFFERENTIAL - Abnormal    WBC 9.93      RBC 5.08      Hgb 15.1      Hct 45.8      MCV 90.2      MCH 29.7      MCHC 33.0      RDW 13.8      Platelet 167      MPV 10.5 (*)     Neut % 64.3      Lymph % 28.1      Mono % 5.8      Eos % 1.2      Basophil % 0.5      Imm Grans % 0.1      Neut # 6.38      Lymph # 2.79      Mono # 0.58      Eos # 0.12      Baso # 0.05      Imm Gran # 0.01      NRBC% 0.0     URINALYSIS, MICROSCOPIC - Abnormal    Bacteria, UA Rare      RBC, UA 0-2      WBC, UA 0-2      Squamous Epithelial Cells, UA Few (*)    LACTIC ACID, PLASMA - Normal    Lactic Acid Level 1.0     CBC W/ AUTO DIFFERENTIAL    Narrative:     The following orders were created for panel order CBC auto differential.  Procedure                               Abnormality         Status                     ---------                               -----------         ------                     CBC with Differential[[id number]]       Abnormal            Final result                 Please view results for these tests on the individual orders.   BASIC METABOLIC PANEL   CBC W/ AUTO DIFFERENTIAL    Narrative:     The following orders were created for panel order CBC auto differential.  Procedure                               Abnormality         Status                     ---------                               -----------         ------                      CBC with Differential[[id number]]                                                        Please view results for these tests on the individual orders.   CBC WITH DIFFERENTIAL          Imaging Results              CT Abdomen Pelvis With IV Contrast NO Oral Contrast (Preliminary result)  Result time 03/29/25 01:02:06      Preliminary result by Ventura Fortune MD (03/29/25 01:02:06)                   Narrative:    START OF REPORT:  Technique: CT of the abdomen and pelvis was performed with axial images as well as sagittal and coronal reconstruction images with intravenous contrast.    Comparison: Comparison is with study dated 2024-02-12 18:38:43.    Clinical History: Abd pain, priors sent.    Dosage Information: Automated Exposure Control was utilized 604.97 mGy.cm.    Findings:  Lines and Tubes: None.  Thorax:  Lungs: Mild linear opacity is present at the visualized lung bases, consistent with scarring and atelectasis. There are multiple stable non calcified nodules in the image lung base with range measurement of 4to 8mm, the largest of which is at the posterior right lower lobe on image 2 series 2. No focal infiltrate or consolidation is seen.  Pleura: No effusions or thickening.  Heart: The heart size is within normal limits. Moderate coronary artery calcification is seen.  Liver: A tiny granuloma is seen in the anterior right hepatic lobe.  Biliary System: No intrahepatic or extrahepatic biliary duct dilatation is seen.  Gallbladder: Surgical clips are seen in the gallbladder fossa which may reflect prior cholecystectomy correlate with surgical history and visual inspection.  Pancreas: The pancreas appears unremarkable.  Spleen: There are numerous low attenuating foci in the spleen.  Adrenals: The adrenal glands appear unremarkable.  Kidneys: Multiple cysts are identified in the left kidney the largest of which measures 2.0 cm is on Image 39, Series 4 in the lower pole of the left kidney.  Multiple cysts are identified in the right kidney the largest of which measures 2.0 cm is on Image 33, Series 4 in the mid pole of the right kidney. The kidneys appear unremarkable with no stones masses or hydronephrosis.  Aorta: There is moderate calcification of the abdominal aorta and its branches.  IVC: Unremarkable.  Bowel:  Esophagus: There is a small hiatal hernia.  Stomach: The stomach appears unremarkable.  Duodenum: A mesenteric swirl is seen adjacent to the 3rd segment of the duodenum. The superior mesenteric vein swirls around the superior mesenteric artery. The proximal jejunum is seen directed towards the right with associated mild wall thickening. 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  Colon: Nondistended.  Appendix: No appendix is identified and surgical clips are seen at the base of the cecum consistent with appendectomy.    Pelvis:  Bladder: The bladder is nondistended but appears otherwise unremarkable.  Female:  Uterus: The uterus is not identified.  Ovaries: No adnexal masses are seen.    Bony structures:  Dorsal Spine: There is subtle spondylosis of the visualized dorsal spine.  Bony Pelvis: There is mild degenerative change of the bilateral hips.    Notifications: The results were discussed with the emergency room physician (Dr Reina) prior to dictation at 2025-03-29 01:00:01 CDT.      Impression:  1. There are multiple stable non calcified nodules in the image lung base with range measurement of 4to 8mm, the largest of which is at the posterior right lower lobe on image 2 series 2. This is of concern for metastasis. No focal infiltrate or consolidation is seen.  2. There are numerous low attenuating foci in the spleen. This is of concern for metastasis.  3. A mesenteric swirl is seen adjacent to the 3rd segment of the duodenum. The superior  mesenteric vein swirls around the superior mesenteric artery. The proximal jejunum is seen directed towards the right with associated mild wall thickening. 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 Correlate clinically as regards additional evaluation and follow-up.  4. Details and other findings as discussed above.                                         Medications   carvediloL tablet 12.5 mg (has no administration in time range)   LIDOcaine HCL 10 mg/ml (1%) injection 1 mL (has no administration in time range)   sodium chloride 0.9% flush 10 mL (has no administration in time range)   ondansetron disintegrating tablet 8 mg (has no administration in time range)   melatonin tablet 6 mg (has no administration in time range)   acetaminophen tablet 650 mg (has no administration in time range)   lactated ringers infusion (has no administration in time range)   acetaminophen tablet 650 mg (has no administration in time range)   oxyCODONE immediate release tablet 5 mg (has no administration in time range)   oxyCODONE immediate release tablet Tab 10 mg (has no administration in time range)   morphine injection 4 mg (has no administration in time range)   morphine injection 2 mg (2 mg Intravenous Given 3/28/25 2231)   lactated ringers bolus 1,000 mL (0 mLs Intravenous Stopped 3/29/25 0031)   ondansetron injection 4 mg (4 mg Intravenous Given 3/28/25 2238)   iohexoL (OMNIPAQUE 350) injection 100 mL (100 mLs Intravenous Given 3/29/25 0028)   morphine injection 2 mg (2 mg Intravenous Given 3/29/25 0140)   0.9% NaCl infusion (1,000 mLs Intravenous New Bag 3/29/25 0135)   labetaloL injection 10 mg (10 mg Intravenous Given 3/29/25 0136)     Medical Decision Making  Differential diagnosis includes but is not limited to:  Volvulus, internal hernia, enteritis    Patient was transferred for  general surgery evaluation.  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  Lactate is normal labs are reassuring.  No vomiting.  Does have tenderness on exam.  Discussed with General surgery is planning to admit      Amount and/or Complexity of Data Reviewed  Labs: ordered. Decision-making details documented in ED Course.  Radiology: ordered.    Risk  Prescription drug management.  Decision regarding hospitalization.               ED Course as of 03/29/25 0433   Sat Mar 29, 2025   0116 Patient needs General surgery evaluation which we do not have.  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

## 2025-03-29 NOTE — ANESTHESIA PROCEDURE NOTES
Intubation    Date/Time: 3/29/2025 10:38 AM    Performed by: Celine Kothari CRNA  Authorized by: Duran oYon MD    Intubation:     Induction:  Rapid sequence induction    Intubated:  Postinduction    Mask Ventilation:  Not attempted    Attempts:  1    Attempted By:  CRNA    Method of Intubation:  Direct    Blade:  Helms 2    Laryngeal View Grade: Grade I - full view of cords      Difficult Airway Encountered?: No      Complications:  None    Airway Device:  Oral endotracheal tube    Airway Device Size:  7.5    Style/Cuff Inflation:  Cuffed (inflated to minimal occlusive pressure)    Inflation Amount (mL):  5    Tube secured:  22    Secured at:  The lips    Placement Verified By:  Capnometry    Complicating Factors:  None    Findings Post-Intubation:  BS equal bilateral and atraumatic/condition of teeth unchanged  Notes:      Cricoid pressure maintained from LOC until + ETCO2 confirmed.

## 2025-03-29 NOTE — H&P
Acute Care Surgery   History and Physical     Patient Name: Nell Mock  YOB: 1940  Date: 03/29/2025 4:02 AM  Date of Admission: 3/28/2025  HD#0  POD#* No surgery found *    PRESENTING HISTORY   Chief Complaint/Reason for Admission: internal hernia, abd pain   History source(s): patient and chart   History of Present Illness:  Patient is a 84-year-old female with a past medical history of hypertension, MI 2019, CAD status post stenting, CKD 3, most recent echo April of 2024 with an EF of 55-60%, extensive abdominal surgical history including appendectomy, colon resection for diverticulitis, hysterectomy, mesh and sling for bladder incontinence who presented to an outside hospital for abdominal pain for the past 2 days.  Patient states that she initially thought it was her diverticulitis pain with a has worsened which brought her to the emergency department.  She had some nausea no emesis.  She had a normal bowel movement the day prior to presentation and a small bowel movement the morning of presentation to the hospital.     Review of Systems:  12 point ROS negative except as stated in HPI    PAST HISTORY:   Past medical history:  Past Medical History:   Diagnosis Date    Arthritis     CKD (chronic kidney disease) stage 3, GFR 30-59 ml/min     Digestive disorder     Essential (primary) hypertension     Leukemia     in remission    MI (myocardial infarction)        Past surgical history:  Past Surgical History:   Procedure Laterality Date    APPENDECTOMY      BLADDER SURGERY      BONE MARROW BIOPSY W/ ASPIRATION      CATARACT EXTRACTION W/  INTRAOCULAR LENS IMPLANT Bilateral     COLONOSCOPY N/A 09/13/2023    Procedure: COLON;  Surgeon: Camilo Gordillo MD;  Location: [facility] ENDOSCOPY;  Service: Gastroenterology;  Laterality: N/A;  LATEX ALLERGY    CORONARY ANGIOPLASTY WITH STENT PLACEMENT      HYSTERECTOMY      INJECTION OF JOINT Right 6/12/2024    Procedure: Injection, Joint, Hip right  hip injection with anesthesia;  Surgeon: Ismael Mackey MD;  Location: [facility];  Service: Orthopedics;  Laterality: Right;  right hip injection with anesthesia    NECK SURGERY      SINUS SURGERY      x3    TONSILLECTOMY         Family history:  Family History   Family history unknown: Yes       Social history:  Social History     Socioeconomic History    Marital status:    Tobacco Use    Smoking status: Never    Smokeless tobacco: Never   Substance and Sexual Activity    Alcohol use: Yes     Comment: wine yearly    Drug use: Never    Sexual activity: Not Currently     Partners: Male     Social Drivers of Health     Financial Resource Strain: Low Risk  (4/25/2022)    Received from [facility]    Overall Financial Resource Strain (CARDIA)     Difficulty of Paying Living Expenses: Not hard at all   Food Insecurity: No Food Insecurity (4/25/2022)    Received from [facility]    Hunger Vital Sign     Worried About Running Out of Food in the Last Year: Never true     Ran Out of Food in the Last Year: Never true   Transportation Needs: Unknown (4/25/2022)    Received from [facility]    PRAPARE - Transportation     Lack of Transportation (Medical): No   Housing Stability: Unknown (4/25/2022)    Received from [facility]sidi[facility] and [facility]    Housing Stability Vital Sign     Unable to Pay for Housing in the Last Year: No     Number of Places Lived in the Last Year: 1     Tobacco Use History[1]   Social History     Substance and Sexual Activity   Alcohol Use Yes    Comment: wine yearly        MEDICATIONS & ALLERGIES:     No current facility-administered medications on file prior to encounter.     Current Outpatient Medications on File Prior to  Encounter   Medication Sig    aspirin 81 MG Chew Take 1 tablet (81 mg total) by mouth once daily. (Patient taking differently: Take 81 mg by mouth every other day.)    co-enzyme Q-10 30 mg capsule Take 30 mg by mouth once daily.    cranberry 500 mg Cap Take 1 capsule by mouth Daily.    furosemide (LASIX) 20 MG tablet Take 20 mg by mouth daily as needed.    NIFEdipine (ADALAT CC) 60 MG TbSR Take 60 mg by mouth once daily.    omega-3 fatty acids/fish oil (FISH OIL-OMEGA-3 FATTY ACIDS) 300-1,000 mg capsule Take by mouth once daily.    vitamin D (VITAMIN D3) 1000 units Tab Take 1,000 Units by mouth once daily.    carvediloL (COREG) 12.5 MG tablet Take 1 tablet (12.5 mg total) by mouth 2 (two) times daily.    pantoprazole (PROTONIX) 40 MG tablet Take 1 tablet (40 mg total) by mouth once daily.     Allergies:   Review of patient's allergies indicates:   Allergen Reactions    Latex Swelling    Benazepril      cough  cough      Crestor [rosuvastatin]     Amoxicillin-pot clavulanate      Patient does not recall reaction type  Patient does not recall reaction type      Ciprofloxacin Rash     Other reaction(s): Bactrim  Urticaria,hives  Urticaria,hives  Urticaria,hives  Urticaria,hives      Shellfish containing products Nausea Only    Sulfamethoxazole-trimethoprim Itching and Other (See Comments)     bleeding  Bleeding (C diff colitis)       Scheduled Meds:   carvediloL  12.5 mg Oral BID     Continuous Infusions:   lactated ringers   Intravenous Continuous         PRN Meds:  Current Facility-Administered Medications:     acetaminophen, 650 mg, Oral, Q8H PRN    acetaminophen, 650 mg, Oral, Q4H PRN    LIDOcaine HCL 10 mg/ml (1%), 1 mL, Intradermal, Once PRN    melatonin, 6 mg, Oral, Nightly PRN    ondansetron, 8 mg, Oral, Q8H PRN    oxyCODONE, 5 mg, Oral, Q4H PRN    oxyCODONE, 10 mg, Oral, Q4H PRN    sodium chloride 0.9%, 10 mL, Intravenous, PRN    OBJECTIVE:   Vital Signs:  VITAL SIGNS: 24 HR MIN & MAX LAST   Temp  Min: 98.2 °F  "(36.8 °C)  Max: 98.3 °F (36.8 °C)  98.2 °F (36.8 °C)   BP  Min: 130/79  Max: 207/81  (!) 190/80    Pulse  Min: 62  Max: 93  62    Resp  Min: 16  Max: 20  20    SpO2  Min: 93 %  Max: 98 %  (!) 94 %      HT: 5' 1" (154.9 cm)  WT: 88.5 kg (195 lb)  BMI: 36.9     Intake/output:  Intake/Output - Last 3 Shifts       None          No intake or output data in the 24 hours ending 03/29/25 0402      Physical Exam:  General: Well developed, well nourished, no acute distress  HEENT: Normocephalic, PERRL  CV: RR  Resp: NWOB  GI:  Abdomen soft,  non-distended, no guarding, no rebound, tenderness to palpation diffusely but most prominently in the midepigastrium, non peritoneal, well-healed surgical scars, obese abdomen  :  Deferred  MSK: No muscle atrophy, cyanosis, peripheral edema, moving all extremities spontaneously  Skin/wounds:  No rashes, ulcers, erythema  Neuro:  CNII-XII grossly intact, alert and oriented to person, place, and time    Labs:  Troponin:  No results for input(s): "TROPONINI" in the last 72 hours.  CBC:  Recent Labs     03/28/25  2154   WBC 9.93   RBC 5.08   HGB 15.1   HCT 45.8      MCV 90.2   MCH 29.7   MCHC 33.0     CMP:  Recent Labs     03/28/25  2154   CALCIUM 9.8   ALBUMIN 3.8      K 4.1   CO2 25      BUN 13.8   CREATININE 1.23*   ALKPHOS 158*   ALT 15   AST 36   BILITOT 0.5     Lactic Acid:  Recent Labs     03/28/25  2334   LACTATE 1.0     ETOH:  No results for input(s): "ETHANOL" in the last 72 hours.   Urine Drug Screen:  No results for input(s): "COCAINE", "OPIATE", "BARBITURATE", "AMPHETAMINE", "FENTANYL", "CANNABINOIDS", "MDMA" in the last 72 hours.    Invalid input(s): "BENZODIAZEPINE", "PHENCYCLIDINE"   ABG:  No results for input(s): "PH", "PO2", "PCO2", "HCO3", "BE" in the last 168 hours.   I have reviewed all pertinent lab results within the past 24 hours.    Diagnostic Results:  Imaging Results              CT Abdomen Pelvis With IV Contrast NO Oral Contrast (Preliminary " result)  Result time 03/29/25 01:02:06      Preliminary result by Ventura Fortune MD (03/29/25 01:02:06)                   Narrative:    START OF REPORT:  Technique: CT of the abdomen and pelvis was performed with axial images as well as sagittal and coronal reconstruction images with intravenous contrast.    Comparison: Comparison is with study dated 2024-02-12 18:38:43.    Clinical History: Abd pain, priors sent.    Dosage Information: Automated Exposure Control was utilized 604.97 mGy.cm.    Findings:  Lines and Tubes: None.  Thorax:  Lungs: Mild linear opacity is present at the visualized lung bases, consistent with scarring and atelectasis. There are multiple stable non calcified nodules in the image lung base with range measurement of 4to 8mm, the largest of which is at the posterior right lower lobe on image 2 series 2. No focal infiltrate or consolidation is seen.  Pleura: No effusions or thickening.  Heart: The heart size is within normal limits. Moderate coronary artery calcification is seen.  Liver: A tiny granuloma is seen in the anterior right hepatic lobe.  Biliary System: No intrahepatic or extrahepatic biliary duct dilatation is seen.  Gallbladder: Surgical clips are seen in the gallbladder fossa which may reflect prior cholecystectomy correlate with surgical history and visual inspection.  Pancreas: The pancreas appears unremarkable.  Spleen: There are numerous low attenuating foci in the spleen.  Adrenals: The adrenal glands appear unremarkable.  Kidneys: Multiple cysts are identified in the left kidney the largest of which measures 2.0 cm is on Image 39, Series 4 in the lower pole of the left kidney. Multiple cysts are identified in the right kidney the largest of which measures 2.0 cm is on Image 33, Series 4 in the mid pole of the right kidney. The kidneys appear unremarkable with no stones masses or hydronephrosis.  Aorta: There is moderate calcification of the abdominal aorta and its  branches.  IVC: Unremarkable.  Bowel:  Esophagus: There is a small hiatal hernia.  Stomach: The stomach appears unremarkable.  Duodenum: A mesenteric swirl is seen adjacent to the 3rd segment of the duodenum. The superior mesenteric vein swirls around the superior mesenteric artery. The proximal jejunum is seen directed towards the right with associated mild wall thickening. 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  Colon: Nondistended.  Appendix: No appendix is identified and surgical clips are seen at the base of the cecum consistent with appendectomy.    Pelvis:  Bladder: The bladder is nondistended but appears otherwise unremarkable.  Female:  Uterus: The uterus is not identified.  Ovaries: No adnexal masses are seen.    Bony structures:  Dorsal Spine: There is subtle spondylosis of the visualized dorsal spine.  Bony Pelvis: There is mild degenerative change of the bilateral hips.    Notifications: The results were discussed with the emergency room physician (Dr Reina) prior to dictation at 2025-03-29 01:00:01 CDT.      Impression:  1. There are multiple stable non calcified nodules in the image lung base with range measurement of 4to 8mm, the largest of which is at the posterior right lower lobe on image 2 series 2. This is of concern for metastasis. No focal infiltrate or consolidation is seen.  2. There are numerous low attenuating foci in the spleen. This is of concern for metastasis.  3. A mesenteric swirl is seen adjacent to the 3rd segment of the duodenum. The superior mesenteric vein swirls around the superior mesenteric artery. The proximal jejunum is seen directed towards the right with associated mild wall thickening. This suggests mild mesenteric volvulus without associated obstruction but with a portion of the proximal jejunum seen previously on the left now  demonstrated to the right of midline. Correlate clinically as regards additional evaluation and follow-up.  4. Details and other findings as discussed above.                                       I have reviewed all pertinent imaging results/findings within the past 24 hours.    ASSESSMENT & PLAN:    Present Illness:  Patient is a 84-year-old female with a past medical history of hypertension, MI 2019, CAD status post stenting, CKD 3, most recent echo April of 2024 with an EF of 55-60%, extensive abdominal surgical history including appendectomy, colon resection for diverticulitis, hysterectomy, mesh and sling for bladder incontinence who is presenting with internal hernia seen on CT scan as well as abdominal pain     -Admit to General surgery team   -NPO   -we will plan for operating room today for diagnostic laparoscopy possible exploratory laparotomy and all other indicated procedures, consents to be obtained.    -maintenance IV fluids   -pain control  -home medications including Coreg      Nevin Holly MD  [employer] General Surgery PGY4              [1]   Social History  Tobacco Use   Smoking Status Never   Smokeless Tobacco Never

## 2025-03-29 NOTE — NURSING
Pt c/o R eye pain. Upon exam, pt's R eye is red, swollen, teary. Dr. Maradiaga was outside, so he came to examine. Ordered eye drops and recommended ice pack. Will continue to monitor.

## 2025-03-29 NOTE — BRIEF OP NOTE
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.2]    Anesthesia: General    Operative Findings (including complications, if any): small bowel with many dense adhesions to the anterior abdominal wall    Description of Technical Procedures: see operative dictation    Significant Surgical Tasks Conducted by the Assistant(s), if Applicable: see operative dictation    Estimated Blood Loss (EBL): * No values recorded between 3/29/2025 10:32 AM and 3/29/2025  1:43 PM *           Implants: * No implants in log *    Specimens:   Specimen (24h ago, onward)      None           * No specimens in log *           Condition: Good    Disposition: PACU - hemodynamically stable.    Attestation: Op Note Attestation: The attending physician performed the procedure.

## 2025-03-29 NOTE — ANESTHESIA PREPROCEDURE EVALUATION
03/29/2025  Nell Mock is a 84 y.o., female.    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     I have reviewed the Nursing Notes. I have reviewed the NPO Status.   I have reviewed the Medications.     Review of Systems  Anesthesia Hx:  No problems with previous Anesthesia                Cardiovascular:  Exercise tolerance: good   Hypertension  Past MI                   Functional Capacity good / => 4 METS                         Pulmonary:  Pulmonary Normal                       Renal/:   Denies Chronic Renal Disease.                Hepatic/GI:  Hepatic/GI Normal                    Musculoskeletal:  Arthritis               Endocrine:        Obesity / BMI > 30  Dermatological:  Skin Normal    Psych:  Psychiatric Normal                    Physical Exam  General: Well nourished, Cooperative and Alert    Airway:  Mallampati: II   Mouth Opening: Normal  TM Distance: Normal  Tongue: Normal    Dental:  Intact    Chest/Lungs:  Normal Respiratory Rate    Heart:  Rate: Normal        Anesthesia Plan  Type of Anesthesia, risks & benefits discussed:    Anesthesia Type: Gen ETT  Intra-op Monitoring Plan: Standard ASA Monitors  Post Op Pain Control Plan: multimodal analgesia  Induction:  IV and rapid sequence  Informed Consent: Informed consent signed with the Patient and all parties understand the risks and agree with anesthesia plan.  All questions answered. Patient consented to blood products? Yes  ASA Score: 3  Day of Surgery Review of History & Physical: H&P Update referred to the surgeon/provider.  Anesthesia Plan Notes:   Aspiration precautions, GETA/RSI/cricoid    Ready For Surgery From Anesthesia Perspective.     .

## 2025-03-29 NOTE — ED PROVIDER NOTES
Encounter Date: 3/28/2025       History     Chief Complaint   Patient presents with    Abdominal Pain     My whole stomach hurts on and off since Wednesday but all day today with nausea     84-year-old female history of hypertension, leukemia, coronary artery disease with 1 stent, CKD, diverticulitis and multiple surgeries including partial colectomy, hysterectomy, cholecystectomy, appendectomy and bladder mesh was in her usual state of health until 2 days ago when she developed intermittent diffuse abdominal pain which is worse today.  She also says her abdomen feels swollen.  Denies diarrhea and constipation.  Last bowel movement today.  No blood or melena.  +nausea but no vomiting.    The history is provided by the patient.     Review of patient's allergies indicates:   Allergen Reactions    Latex Swelling    Benazepril      cough  cough      Crestor [rosuvastatin]     Amoxicillin-pot clavulanate      Patient does not recall reaction type  Patient does not recall reaction type      Ciprofloxacin Rash     Other reaction(s): Bactrim  Urticaria,hives  Urticaria,hives  Urticaria,hives  Urticaria,hives      Shellfish containing products Nausea Only    Sulfamethoxazole-trimethoprim Itching and Other (See Comments)     bleeding  Bleeding (C diff colitis)       Past Medical History:   Diagnosis Date    Arthritis     CKD (chronic kidney disease) stage 3, GFR 30-59 ml/min     Digestive disorder     Essential (primary) hypertension     Leukemia     in remission    MI (myocardial infarction)      Past Surgical History:   Procedure Laterality Date    APPENDECTOMY      BLADDER SURGERY      BONE MARROW BIOPSY W/ ASPIRATION      CATARACT EXTRACTION W/  INTRAOCULAR LENS IMPLANT Bilateral     COLONOSCOPY N/A 09/13/2023    Procedure: COLON;  Surgeon: Camilo Gordillo MD;  Location: [facility] ENDOSCOPY;  Service: Gastroenterology;  Laterality: N/A;  LATEX ALLERGY    CORONARY ANGIOPLASTY WITH STENT PLACEMENT       HYSTERECTOMY      INJECTION OF JOINT Right 6/12/2024    Procedure: Injection, Joint, Hip right hip injection with anesthesia;  Surgeon: Ismael Mackey MD;  Location: [facility] OR;  Service: Orthopedics;  Laterality: Right;  right hip injection with anesthesia    NECK SURGERY      SINUS SURGERY      x3    TONSILLECTOMY       Family History   Family history unknown: Yes     Social History[1]  Review of Systems   Constitutional:  Negative for chills, diaphoresis, fatigue and fever.   HENT:  Negative for congestion, drooling, ear discharge, ear pain, postnasal drip, rhinorrhea, sinus pressure, sinus pain, sore throat and tinnitus.    Eyes:  Negative for discharge.   Respiratory:  Negative for cough, chest tightness, shortness of breath and wheezing.    Cardiovascular:  Negative for chest pain and palpitations.   Gastrointestinal:  Positive for abdominal distention, abdominal pain and nausea. Negative for blood in stool, constipation, diarrhea and vomiting.   Genitourinary:  Negative for frequency, hematuria and urgency.   Musculoskeletal:  Negative for back pain, neck pain and neck stiffness.   Skin:  Negative for rash.   Neurological:  Negative for syncope, weakness, light-headedness, numbness and headaches.   Psychiatric/Behavioral:  Negative for suicidal ideas.        Physical Exam     Initial Vitals [03/28/25 2144]   BP Pulse Resp Temp SpO2   (!) 195/83 85 20 98.3 °F (36.8 °C) 97 %      MAP       --         Physical Exam    Nursing note and vitals reviewed.  HENT: Mouth/Throat: Oropharynx is clear and moist.   Eyes: Conjunctivae are normal.   Cardiovascular:  Normal rate.           Pulmonary/Chest: Breath sounds normal. No respiratory distress.   Abdominal: Abdomen is soft. There is generalized abdominal tenderness. There is guarding (Voluntary).   Musculoskeletal:         General: Normal range of motion.     Neurological: She is alert and oriented to person, place, and time.   Skin: No rash noted. No  erythema. No pallor.         ED Course   Procedures  Labs Reviewed   URINALYSIS, REFLEX TO URINE CULTURE - Abnormal       Result Value    Color, UA Yellow      Appearance, UA Clear      Specific Gravity, UA 1.015      pH, UA 7.5      Protein,  (*)     Glucose, UA Negative      Ketones, UA Negative      Blood, UA Trace (*)     Bilirubin, UA Negative      Urobilinogen, UA 0.2      Nitrites, UA Negative      Leukocyte Esterase, UA Negative     COMPREHENSIVE METABOLIC PANEL - Abnormal    Sodium 141      Potassium 4.1      Chloride 106      CO2 25      Glucose 97      Blood Urea Nitrogen 13.8      Creatinine 1.23 (*)     Calcium 9.8      Protein Total 7.5      Albumin 3.8      Globulin 3.7 (*)     Albumin/Globulin Ratio 1.0 (*)     Bilirubin Total 0.5       (*)     ALT 15      AST 36      eGFR 44      Anion Gap 10.0      BUN/Creatinine Ratio 11     LIPASE - Abnormal    Lipase Level 82 (*)    CBC WITH DIFFERENTIAL - Abnormal    WBC 9.93      RBC 5.08      Hgb 15.1      Hct 45.8      MCV 90.2      MCH 29.7      MCHC 33.0      RDW 13.8      Platelet 167      MPV 10.5 (*)     Neut % 64.3      Lymph % 28.1      Mono % 5.8      Eos % 1.2      Basophil % 0.5      Imm Grans % 0.1      Neut # 6.38      Lymph # 2.79      Mono # 0.58      Eos # 0.12      Baso # 0.05      Imm Gran # 0.01      NRBC% 0.0     URINALYSIS, MICROSCOPIC - Abnormal    Bacteria, UA Rare      RBC, UA 0-2      WBC, UA 0-2      Squamous Epithelial Cells, UA Few (*)    LACTIC ACID, PLASMA - Normal    Lactic Acid Level 1.0     CBC W/ AUTO DIFFERENTIAL    Narrative:     The following orders were created for panel order CBC auto differential.  Procedure                               Abnormality         Status                     ---------                               -----------         ------                     CBC with Differential[[id number]]       Abnormal            Final result                 Please view results for these tests on the  individual orders.          Imaging Results              CT Abdomen Pelvis With IV Contrast NO Oral Contrast (Preliminary result)  Result time 03/29/25 01:02:06      Preliminary result by Ventura Fortune MD (03/29/25 01:02:06)                   Narrative:    START OF REPORT:  Technique: CT of the abdomen and pelvis was performed with axial images as well as sagittal and coronal reconstruction images with intravenous contrast.    Comparison: Comparison is with study dated 2024-02-12 18:38:43.    Clinical History: Abd pain, priors sent.    Dosage Information: Automated Exposure Control was utilized 604.97 mGy.cm.    Findings:  Lines and Tubes: None.  Thorax:  Lungs: Mild linear opacity is present at the visualized lung bases, consistent with scarring and atelectasis. There are multiple stable non calcified nodules in the image lung base with range measurement of 4to 8mm, the largest of which is at the posterior right lower lobe on image 2 series 2. No focal infiltrate or consolidation is seen.  Pleura: No effusions or thickening.  Heart: The heart size is within normal limits. Moderate coronary artery calcification is seen.  Liver: A tiny granuloma is seen in the anterior right hepatic lobe.  Biliary System: No intrahepatic or extrahepatic biliary duct dilatation is seen.  Gallbladder: Surgical clips are seen in the gallbladder fossa which may reflect prior cholecystectomy correlate with surgical history and visual inspection.  Pancreas: The pancreas appears unremarkable.  Spleen: There are numerous low attenuating foci in the spleen.  Adrenals: The adrenal glands appear unremarkable.  Kidneys: Multiple cysts are identified in the left kidney the largest of which measures 2.0 cm is on Image 39, Series 4 in the lower pole of the left kidney. Multiple cysts are identified in the right kidney the largest of which measures 2.0 cm is on Image 33, Series 4 in the mid pole of the right kidney. The kidneys appear  unremarkable with no stones masses or hydronephrosis.  Aorta: There is moderate calcification of the abdominal aorta and its branches.  IVC: Unremarkable.  Bowel:  Esophagus: There is a small hiatal hernia.  Stomach: The stomach appears unremarkable.  Duodenum: A mesenteric swirl is seen adjacent to the 3rd segment of the duodenum. The superior mesenteric vein swirls around the superior mesenteric artery. The proximal jejunum is seen directed towards the right with associated mild wall thickening. 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  Colon: Nondistended.  Appendix: No appendix is identified and surgical clips are seen at the base of the cecum consistent with appendectomy.    Pelvis:  Bladder: The bladder is nondistended but appears otherwise unremarkable.  Female:  Uterus: The uterus is not identified.  Ovaries: No adnexal masses are seen.    Bony structures:  Dorsal Spine: There is subtle spondylosis of the visualized dorsal spine.  Bony Pelvis: There is mild degenerative change of the bilateral hips.    Notifications: The results were discussed with the emergency room physician (Dr Reina) prior to dictation at 2025-03-29 01:00:01 CDT.      Impression:  1. There are multiple stable non calcified nodules in the image lung base with range measurement of 4to 8mm, the largest of which is at the posterior right lower lobe on image 2 series 2. This is of concern for metastasis. No focal infiltrate or consolidation is seen.  2. There are numerous low attenuating foci in the spleen. This is of concern for metastasis.  3. A mesenteric swirl is seen adjacent to the 3rd segment of the duodenum. The superior mesenteric vein swirls around the superior mesenteric artery. The proximal jejunum is seen directed towards the right with associated mild wall thickening. This suggests mild  mesenteric volvulus without associated obstruction but with a portion of the proximal jejunum seen previously on the left now demonstrated to the right of midline. Correlate clinically as regards additional evaluation and follow-up.  4. Details and other findings as discussed above.                                         Medications   morphine injection 2 mg (has no administration in time range)   0.9% NaCl infusion (has no administration in time range)   labetaloL injection 10 mg (has no administration in time range)   morphine injection 2 mg (2 mg Intravenous Given 3/28/25 2231)   lactated ringers bolus 1,000 mL (0 mLs Intravenous Stopped 3/29/25 0031)   ondansetron injection 4 mg (4 mg Intravenous Given 3/28/25 2238)   iohexoL (OMNIPAQUE 350) injection 100 mL (100 mLs Intravenous Given 3/29/25 0028)     Medical Decision Making  Medical Decision Making  Problem list/ differential diagnosis including but not limited to:  bowel obstruction/ ileus,  volvulus, pancreatitis, gastroenteritis, colitis,  gastroparesis, hepatitis, uti/pyelonephritis, renal colic    Patient's chronic illnesses impacting care:  Multiple surgeries, CAD    Diagnostic test considered but not ordered:    My interpretations:      Radiology reports      Discussion of case with external qualified healthcare professionals:  Not applicable    Review of external notes( inpt, ems, NH, clinic):      Decision rules/scores:    Medications reviewed:  Medications ordered in the ER:  IV fluids, morphine, Zofran  Discharge prescriptions:    Social variables possible impacting patient's healthcare:    Code status/discussion    Shared decision making:    Consideration for admission versus discharge: stable for transfer    Amount and/or Complexity of Data Reviewed  Labs: ordered. Decision-making details documented in ED Course.  Radiology: ordered.    Risk  Prescription drug management.               ED Course as of 04/01/25 0729   Sat Mar 29, 2025   0116  Patient needs General surgery evaluation which we do not have.  Dr D[facility]az at [facility] accepts pt to their ER [WC]   0119 Lactic Acid Level: 1.0 [WC]   0120 Creatinine(!): 1.23 [WC]   0120 WBC: 9.93 [WC]   0313 General surgery has been consulted they will come and evaluate [LF]      ED Course User Index  [LF] Mingo Díaz MD  [WC] Colby Reina MD                           Clinical Impression:  Final diagnoses:  [K56.2] Volvulus (Primary)  [R10.9] Abdominal pain, unspecified abdominal location          ED Disposition Condition    Transfer to Another Facility Stable                    [1]  Social History  Tobacco Use    Smoking status: Never    Smokeless tobacco: Never   Substance Use Topics    Alcohol use: Yes     Comment: wine yearly    Drug use: Never      Colby Reina MD  04/01/25 0729

## 2025-03-30 PROCEDURE — 25000003 PHARM REV CODE 250

## 2025-03-30 PROCEDURE — 25000003 PHARM REV CODE 250: Performed by: SURGERY

## 2025-03-30 PROCEDURE — 99233 SBSQ HOSP IP/OBS HIGH 50: CPT | Mod: ,,, | Performed by: SURGERY

## 2025-03-30 PROCEDURE — 27000221 HC OXYGEN, UP TO 24 HOURS

## 2025-03-30 PROCEDURE — 94760 N-INVAS EAR/PLS OXIMETRY 1: CPT

## 2025-03-30 PROCEDURE — 11000001 HC ACUTE MED/SURG PRIVATE ROOM

## 2025-03-30 PROCEDURE — 63600175 PHARM REV CODE 636 W HCPCS

## 2025-03-30 PROCEDURE — 25000003 PHARM REV CODE 250: Performed by: STUDENT IN AN ORGANIZED HEALTH CARE EDUCATION/TRAINING PROGRAM

## 2025-03-30 PROCEDURE — 63600175 PHARM REV CODE 636 W HCPCS: Performed by: STUDENT IN AN ORGANIZED HEALTH CARE EDUCATION/TRAINING PROGRAM

## 2025-03-30 RX ORDER — OXYCODONE HYDROCHLORIDE 10 MG/1
10 TABLET ORAL EVERY 6 HOURS PRN
Refills: 0 | Status: DISCONTINUED | OUTPATIENT
Start: 2025-03-30 | End: 2025-04-04 | Stop reason: HOSPADM

## 2025-03-30 RX ORDER — ONDANSETRON HYDROCHLORIDE 2 MG/ML
4 INJECTION, SOLUTION INTRAVENOUS EVERY 6 HOURS PRN
Status: DISCONTINUED | OUTPATIENT
Start: 2025-03-30 | End: 2025-04-04 | Stop reason: HOSPADM

## 2025-03-30 RX ORDER — METHOCARBAMOL 500 MG/1
500 TABLET, FILM COATED ORAL 3 TIMES DAILY PRN
Status: DISCONTINUED | OUTPATIENT
Start: 2025-03-30 | End: 2025-04-04 | Stop reason: HOSPADM

## 2025-03-30 RX ORDER — NIFEDIPINE 60 MG/1
60 TABLET, EXTENDED RELEASE ORAL DAILY
Status: DISCONTINUED | OUTPATIENT
Start: 2025-03-30 | End: 2025-04-04 | Stop reason: HOSPADM

## 2025-03-30 RX ORDER — ENOXAPARIN SODIUM 100 MG/ML
30 INJECTION SUBCUTANEOUS EVERY 24 HOURS
Status: DISCONTINUED | OUTPATIENT
Start: 2025-03-30 | End: 2025-04-04 | Stop reason: HOSPADM

## 2025-03-30 RX ORDER — HYDROMORPHONE HYDROCHLORIDE 2 MG/ML
0.5 INJECTION, SOLUTION INTRAMUSCULAR; INTRAVENOUS; SUBCUTANEOUS EVERY 6 HOURS PRN
Status: DISCONTINUED | OUTPATIENT
Start: 2025-03-30 | End: 2025-04-04 | Stop reason: HOSPADM

## 2025-03-30 RX ORDER — ONDANSETRON 4 MG/1
4 TABLET, ORALLY DISINTEGRATING ORAL EVERY 6 HOURS PRN
Status: DISCONTINUED | OUTPATIENT
Start: 2025-03-30 | End: 2025-04-04 | Stop reason: HOSPADM

## 2025-03-30 RX ORDER — HYDRALAZINE HYDROCHLORIDE 20 MG/ML
10 INJECTION INTRAMUSCULAR; INTRAVENOUS EVERY 6 HOURS PRN
Status: DISCONTINUED | OUTPATIENT
Start: 2025-03-30 | End: 2025-04-04 | Stop reason: HOSPADM

## 2025-03-30 RX ORDER — ACETAMINOPHEN 10 MG/ML
1000 INJECTION, SOLUTION INTRAVENOUS EVERY 8 HOURS
Status: COMPLETED | OUTPATIENT
Start: 2025-03-30 | End: 2025-03-31

## 2025-03-30 RX ADMIN — MUPIROCIN: 20 OINTMENT TOPICAL at 10:03

## 2025-03-30 RX ADMIN — ACETAMINOPHEN 1000 MG: 10 INJECTION INTRAVENOUS at 10:03

## 2025-03-30 RX ADMIN — ONDANSETRON 4 MG: 4 TABLET, ORALLY DISINTEGRATING ORAL at 05:03

## 2025-03-30 RX ADMIN — ENOXAPARIN SODIUM 30 MG: 30 INJECTION SUBCUTANEOUS at 05:03

## 2025-03-30 RX ADMIN — SODIUM CHLORIDE, POTASSIUM CHLORIDE, SODIUM LACTATE AND CALCIUM CHLORIDE: 600; 310; 30; 20 INJECTION, SOLUTION INTRAVENOUS at 05:03

## 2025-03-30 RX ADMIN — ACETAMINOPHEN 1000 MG: 10 INJECTION INTRAVENOUS at 02:03

## 2025-03-30 RX ADMIN — HYDRALAZINE HYDROCHLORIDE 10 MG: 20 INJECTION INTRAMUSCULAR; INTRAVENOUS at 02:03

## 2025-03-30 RX ADMIN — NIFEDIPINE 60 MG: 60 TABLET, FILM COATED, EXTENDED RELEASE ORAL at 10:03

## 2025-03-30 RX ADMIN — ACETAMINOPHEN 1000 MG: 10 INJECTION INTRAVENOUS at 05:03

## 2025-03-30 RX ADMIN — OXYCODONE HYDROCHLORIDE 10 MG: 10 TABLET ORAL at 10:03

## 2025-03-30 RX ADMIN — SODIUM CHLORIDE, POTASSIUM CHLORIDE, SODIUM LACTATE AND CALCIUM CHLORIDE: 600; 310; 30; 20 INJECTION, SOLUTION INTRAVENOUS at 10:03

## 2025-03-30 NOTE — PROGRESS NOTES
"   Acute Care Surgery   Progress Note  Admit Date: 3/28/2025  HD#1  POD#1 Day Post-Op    Subjective:   Interval history:  AF HTNsive 170s  VSOS  Pain well controlled this morning  Passing flatus   Denies BM, nausea, vomiting     Home Meds:  Current Outpatient Medications   Medication Instructions    aspirin 81 mg, Oral, Daily    carvediloL (COREG) 12.5 mg, Oral, 2 times daily    co-enzyme Q-10 30 mg, Daily    cranberry 500 mg Cap 1 capsule, Daily    furosemide (LASIX) 20 mg, Daily PRN    NIFEdipine (ADALAT CC) 60 mg, Daily    omega-3 fatty acids/fish oil (FISH OIL-OMEGA-3 FATTY ACIDS) 300-1,000 mg capsule Daily    pantoprazole (PROTONIX) 40 mg, Oral, Daily    vitamin D (VITAMIN D3) 1,000 Units, Daily      Scheduled Meds:   acetaminophen  1,000 mg Intravenous Q8H    carvediloL  12.5 mg Oral BID    mupirocin   Nasal BID     Continuous Infusions:   lactated ringers   Intravenous Continuous 125 mL/hr at 03/29/25 1836 Rate Verify at 03/29/25 1836     PRN Meds:  Current Facility-Administered Medications:     artificial tears, 1 drop, Both Eyes, PRN    HYDROmorphone, 0.2 mg, Intravenous, Q6H PRN    HYDROmorphone, 0.5 mg, Intravenous, Q6H PRN    LIDOcaine HCL 10 mg/ml (1%), 1 mL, Intradermal, Once PRN    sodium chloride 0.9%, 10 mL, Intravenous, PRN     Objective:     VITAL SIGNS: 24 HR MIN & MAX LAST   Temp  Min: 97.8 °F (36.6 °C)  Max: 98.1 °F (36.7 °C)  97.8 °F (36.6 °C)   BP  Min: 121/68  Max: 213/77  126/77    Pulse  Min: 55  Max: 68  60    Resp  Min: 10  Max: 20  16    SpO2  Min: 92 %  Max: 97 %  97 %      HT: 5' 1" (154.9 cm)  WT: 88.5 kg (195 lb 1.7 oz)  BMI: 36.9     Intake/output:  Intake/Output - Last 3 Shifts         03/28 0700  03/29 0659 03/29 0700  03/30 0659    I.V. (mL/kg) 0 (0) 718.4 (8.1)    IV Piggyback  1100    Total Intake(mL/kg) 0 (0) 1818.4 (20.5)    Urine (mL/kg/hr)  850 (0.4)    Total Output  850    Net 0 +968.4                  Intake/Output Summary (Last 24 hours) at 3/30/2025 7145  Last data " "filed at 3/30/2025 0118  Gross per 24 hour   Intake 1818.36 ml   Output 850 ml   Net 968.36 ml           Lines/drains/airway:       Peripheral IV - Single Lumen 03/28/25 2159 20 G Anterior;Right Forearm (Active)   Site Assessment Clean;Dry;Intact 03/29/25 2119   Line Securement Device Secured with sutureless device 03/29/25 2119   Extremity Assessment Distal to IV No abnormal discoloration 03/29/25 2119   Line Status Infusing 03/29/25 2119   Dressing Status Clean;Dry;Intact 03/29/25 2119   Dressing Intervention Integrity maintained 03/29/25 2119   Number of days: 1            Peripheral IV - Single Lumen 03/29/25 1056 20 G Right Hand (Active)   Site Assessment Clean;Dry;Intact 03/29/25 2119   Line Securement Device Secured with sutureless device 03/29/25 2119   Extremity Assessment Distal to IV No abnormal discoloration 03/29/25 2119   Line Status Saline locked;Flushed 03/29/25 2119   Dressing Status Clean;Dry;Intact 03/29/25 2119   Dressing Intervention Integrity maintained 03/29/25 2119   Number of days: 0            Urethral Catheter 03/29/25 1400 (Active)   Site Assessment Clean;Intact 03/29/25 2119   Collection Container Urimeter 03/29/25 2119   Securement Method secured to top of thigh w/ leg strap 03/29/25 2119   Reason for Continuing Urinary Catheterization Post operative 03/29/25 2119   CAUTI Prevention Bundle Securement Device in place with 1" slack 03/29/25 2119   Output (mL) 850 mL 03/30/25 0118   Number of days: 0       Physical examination:  Gen: NAD, AAOx3, answering questions appropriately  HEENT: on NC  CV: RR  Resp: NWOB  Abd: soft, distended, appropriate TTP  Ext: moving all extremities spontaneously and purposefully  Neuro: CN II-XII grossly intact    Labs:  Renal:  Recent Labs     03/28/25 2154 03/29/25  0532   BUN 13.8 13.3   CREATININE 1.23* 1.07*     No results for input(s): "LACTIC" in the last 72 hours.  FENGI:  Recent Labs     03/28/25 2154 03/29/25  0532    141   K 4.1 3.9   CL " "106 106   CO2 25 24   CALCIUM 9.8 8.6   ALBUMIN 3.8  --    BILITOT 0.5  --    AST 36  --    ALKPHOS 158*  --    ALT 15  --      Heme:  Recent Labs     03/28/25 2154 03/29/25  0532   HGB 15.1 13.5   HCT 45.8 42.3    143     ID:  Recent Labs     03/28/25 2154 03/29/25  0532   WBC 9.93 6.89     CBG:  Recent Labs     03/28/25 2154 03/29/25  0532   GLUCOSE 97 80*      Cardiovascular:  No results for input(s): "TROPONINI", "CKTOTAL", "CKMB", "BNP" in the last 168 hours.  ABG:  No results for input(s): "PH", "PO2", "PCO2", "HCO3", "BE" in the last 168 hours.   I have reviewed all pertinent lab results within the past 24 hours.    Imaging:  CT Abdomen Pelvis With IV Contrast NO Oral Contrast   Final Result      1. Multiple stable non calcified nodules in the image lung base with range measurement of 4to 8mm, the largest of which is at the posterior right lower lobe on image 2 series 2. This is of concern for metastasis. No focal infiltrate or consolidation is seen.   2. Numerous low attenuating foci in the spleen. This is of concern for metastasis.   3. Mesenteric swirl is seen adjacent to the 3rd segment of the duodenum. The superior mesenteric vein swirls around the superior mesenteric artery. The proximal jejunum is seen directed towards the right with associated mild wall thickening. 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 Correlate clinically as regards additional evaluation and follow-up.   4. Details and other findings as discussed above.   5. The findings are concordant with the [facility] report.         Electronically signed by: Casey Reardon   Date:    03/29/2025   Time:    08:27         I have reviewed all pertinent imaging results/findings within the past 24 hours.    Micro/Path/Other:  Microbiology Results (last 7 days)       ** No results found for " the last 168 hours. **           Pathology Results  (Last 7 days)      None             Assessment & Plan:   Patient is a 84-year-old female with a past medical history of hypertension, MI 2019, CAD status post stenting, CKD 3, most recent echo April of 2024 with an EF of 55-60%, extensive abdominal surgical history including appendectomy, colon resection for diverticulitis, hysterectomy, mesh and sling for bladder incontinence who is presenting with internal hernia seen on CT scan as well as abdominal pain. S/p laparoscopic converted to open lysis of adhesions on 3/29.    - ok for CLD  - daily labs   - will restart some home meds today   - prn antihypertensives   - transition to MMPC  - DVT ppx     Mildred Moreno MD  [employer] General Surgery, PGY-2

## 2025-03-31 LAB
ANION GAP SERPL CALC-SCNC: 10 MEQ/L
BASOPHILS # BLD AUTO: 0.03 X10(3)/MCL
BASOPHILS NFR BLD AUTO: 0.5 %
BUN SERPL-MCNC: 10.7 MG/DL (ref 9.8–20.1)
CALCIUM SERPL-MCNC: 8.1 MG/DL (ref 8.4–10.2)
CHLORIDE SERPL-SCNC: 109 MMOL/L (ref 98–107)
CO2 SERPL-SCNC: 23 MMOL/L (ref 23–31)
CREAT SERPL-MCNC: 0.81 MG/DL (ref 0.55–1.02)
CREAT/UREA NIT SERPL: 13
EOSINOPHIL # BLD AUTO: 0.08 X10(3)/MCL (ref 0–0.9)
EOSINOPHIL NFR BLD AUTO: 1.3 %
ERYTHROCYTE [DISTWIDTH] IN BLOOD BY AUTOMATED COUNT: 14 % (ref 11.5–17)
GFR SERPLBLD CREATININE-BSD FMLA CKD-EPI: >60 ML/MIN/1.73/M2
GLUCOSE SERPL-MCNC: 69 MG/DL (ref 82–115)
HCT VFR BLD AUTO: 36.5 % (ref 37–47)
HGB BLD-MCNC: 12.1 G/DL (ref 12–16)
IMM GRANULOCYTES # BLD AUTO: 0.02 X10(3)/MCL (ref 0–0.04)
IMM GRANULOCYTES NFR BLD AUTO: 0.3 %
LYMPHOCYTES # BLD AUTO: 1.4 X10(3)/MCL (ref 0.6–4.6)
LYMPHOCYTES NFR BLD AUTO: 22 %
MCH RBC QN AUTO: 29.2 PG (ref 27–31)
MCHC RBC AUTO-ENTMCNC: 33.2 G/DL (ref 33–36)
MCV RBC AUTO: 88.2 FL (ref 80–94)
MONOCYTES # BLD AUTO: 0.27 X10(3)/MCL (ref 0.1–1.3)
MONOCYTES NFR BLD AUTO: 4.2 %
NEUTROPHILS # BLD AUTO: 4.57 X10(3)/MCL (ref 2.1–9.2)
NEUTROPHILS NFR BLD AUTO: 71.7 %
NRBC BLD AUTO-RTO: 0 %
PLATELET # BLD AUTO: 129 X10(3)/MCL (ref 130–400)
PLATELETS.RETICULATED NFR BLD AUTO: 3.1 % (ref 0.9–11.2)
PMV BLD AUTO: 11.3 FL (ref 7.4–10.4)
POTASSIUM SERPL-SCNC: 3.3 MMOL/L (ref 3.5–5.1)
RBC # BLD AUTO: 4.14 X10(6)/MCL (ref 4.2–5.4)
SODIUM SERPL-SCNC: 142 MMOL/L (ref 136–145)
WBC # BLD AUTO: 6.37 X10(3)/MCL (ref 4.5–11.5)

## 2025-03-31 PROCEDURE — 99233 SBSQ HOSP IP/OBS HIGH 50: CPT | Mod: ,,, | Performed by: SURGERY

## 2025-03-31 PROCEDURE — 25000003 PHARM REV CODE 250: Performed by: SURGERY

## 2025-03-31 PROCEDURE — 63600175 PHARM REV CODE 636 W HCPCS: Mod: JZ,TB

## 2025-03-31 PROCEDURE — 25000003 PHARM REV CODE 250

## 2025-03-31 PROCEDURE — 80048 BASIC METABOLIC PNL TOTAL CA: CPT

## 2025-03-31 PROCEDURE — 25000003 PHARM REV CODE 250: Performed by: STUDENT IN AN ORGANIZED HEALTH CARE EDUCATION/TRAINING PROGRAM

## 2025-03-31 PROCEDURE — 11000001 HC ACUTE MED/SURG PRIVATE ROOM

## 2025-03-31 PROCEDURE — 36415 COLL VENOUS BLD VENIPUNCTURE: CPT

## 2025-03-31 PROCEDURE — 85025 COMPLETE CBC W/AUTO DIFF WBC: CPT

## 2025-03-31 RX ORDER — SILVER NITRATE 38.21; 12.74 MG/1; MG/1
3 STICK TOPICAL ONCE
Status: DISCONTINUED | OUTPATIENT
Start: 2025-03-31 | End: 2025-04-04 | Stop reason: HOSPADM

## 2025-03-31 RX ORDER — SILVER NITRATE 38.21; 12.74 MG/1; MG/1
1 STICK TOPICAL ONCE
Status: DISCONTINUED | OUTPATIENT
Start: 2025-03-31 | End: 2025-03-31

## 2025-03-31 RX ORDER — DIPHENHYDRAMINE HCL 25 MG
25 CAPSULE ORAL ONCE
Status: DISCONTINUED | OUTPATIENT
Start: 2025-04-01 | End: 2025-04-01

## 2025-03-31 RX ADMIN — NIFEDIPINE 60 MG: 60 TABLET, FILM COATED, EXTENDED RELEASE ORAL at 09:03

## 2025-03-31 RX ADMIN — POTASSIUM BICARBONATE 40 MEQ: 391 TABLET, EFFERVESCENT ORAL at 12:03

## 2025-03-31 RX ADMIN — ENOXAPARIN SODIUM 30 MG: 30 INJECTION SUBCUTANEOUS at 05:03

## 2025-03-31 RX ADMIN — CARVEDILOL 12.5 MG: 12.5 TABLET, FILM COATED ORAL at 09:03

## 2025-03-31 RX ADMIN — MUPIROCIN: 20 OINTMENT TOPICAL at 09:03

## 2025-03-31 RX ADMIN — HYDROMORPHONE HYDROCHLORIDE 0.5 MG: 2 INJECTION, SOLUTION INTRAMUSCULAR; INTRAVENOUS; SUBCUTANEOUS at 11:03

## 2025-03-31 RX ADMIN — ACETAMINOPHEN 1000 MG: 10 INJECTION INTRAVENOUS at 05:03

## 2025-03-31 RX ADMIN — MUPIROCIN: 20 OINTMENT TOPICAL at 11:03

## 2025-03-31 NOTE — PROGRESS NOTES
Acute Care Surgery   Progress Note  Admit Date: 3/28/2025  HD#2  POD#2 Days Post-Op    Subjective:   Interval history:  AF, HTN sysR 131-191. Otherwise VSS. Port site w/ venous bleed overnight, sutured w/ overlying surgicell. Doing well w/ pain well controlled. Voiding w/ last BM prior to surgery, passing flatus. No chest pain, shortness of breath, or fever.    Home Meds:  Current Outpatient Medications   Medication Instructions    aspirin 81 mg, Oral, Daily    carvediloL (COREG) 12.5 mg, Oral, 2 times daily    co-enzyme Q-10 30 mg, Daily    cranberry 500 mg Cap 1 capsule, Daily    furosemide (LASIX) 20 mg, Daily PRN    NIFEdipine (ADALAT CC) 60 mg, Daily    omega-3 fatty acids/fish oil (FISH OIL-OMEGA-3 FATTY ACIDS) 300-1,000 mg capsule Daily    pantoprazole (PROTONIX) 40 mg, Oral, Daily    vitamin D (VITAMIN D3) 1,000 Units, Daily      Scheduled Meds:   carvediloL  12.5 mg Oral BID    enoxparin  30 mg Subcutaneous Q24H (prophylaxis, 1700)    mupirocin   Nasal BID    NIFEdipine  60 mg Oral Daily    silver nitrate applicators  3 applicator Topical (Top) Once     Continuous Infusions:   lactated ringers   Intravenous Continuous 125 mL/hr at 03/30/25 2219 New Bag at 03/30/25 2219     PRN Meds:  Current Facility-Administered Medications:     artificial tears, 1 drop, Both Eyes, PRN    hydrALAZINE, 10 mg, Intravenous, Q6H PRN    HYDROmorphone, 0.5 mg, Intravenous, Q6H PRN    LIDOcaine HCL 10 mg/ml (1%), 1 mL, Intradermal, Once PRN    methocarbamoL, 500 mg, Oral, TID PRN    ondansetron, 4 mg, Oral, Q6H PRN    ondansetron, 4 mg, Intravenous, Q6H PRN    oxyCODONE, 10 mg, Oral, Q6H PRN    oxyCODONE, 10 mg, Oral, Q6H PRN    sodium chloride 0.9%, 10 mL, Intravenous, PRN     Objective:     VITAL SIGNS: 24 HR MIN & MAX LAST   Temp  Min: 97.6 °F (36.4 °C)  Max: 98.3 °F (36.8 °C)  98.2 °F (36.8 °C)   BP  Min: 131/68  Max: 191/73  (!) 154/62    Pulse  Min: 50  Max: 83  69    Resp  Min: 16  Max: 16  16    SpO2  Min: 90 %  Max:  "96 %  (!) 93 %      HT: 5' 1" (154.9 cm)  WT: 88.5 kg (195 lb 1.7 oz)  BMI: 36.9     Intake/output:  Intake/Output - Last 3 Shifts         03/29 0700 03/30 0659 03/30 0700 03/31 0659 03/31 0700 04/01 0659    P.O.  840     I.V. (mL/kg) 718.4 (8.1)      IV Piggyback 1100      Total Intake(mL/kg) 1818.4 (20.5) 840 (9.5)     Urine (mL/kg/hr) 850 (0.4) 4000 (1.9)     Stool  0     Total Output 850 4000     Net +968.4 -3160            Stool Occurrence  0 x             Intake/Output Summary (Last 24 hours) at 3/31/2025 0730  Last data filed at 3/31/2025 0600  Gross per 24 hour   Intake 840 ml   Output 4000 ml   Net -3160 ml         Lines/drains/airway:       Peripheral IV - Single Lumen 03/28/25 2159 20 G Anterior;Right Forearm (Active)   Site Assessment Clean;Dry;Intact 03/31/25 0400   Line Securement Device Secured with sutureless device 03/30/25 0800   Extremity Assessment Distal to IV No abnormal discoloration;No redness;No swelling;No warmth 03/30/25 2000   Line Status Infusing 03/31/25 0400   Dressing Status Clean;Dry;Intact 03/31/25 0400   Dressing Intervention Integrity maintained 03/31/25 0400   Number of days: 2            Peripheral IV - Single Lumen 03/29/25 1056 20 G Right Hand (Active)   Site Assessment Clean;Dry;Intact 03/31/25 0400   Line Securement Device Secured with sutureless device 03/30/25 0800   Extremity Assessment Distal to IV No abnormal discoloration;No redness;No swelling;No warmth 03/30/25 2000   Line Status Saline locked 03/31/25 0400   Dressing Status Clean;Dry;Intact 03/31/25 0400   Dressing Intervention Integrity maintained 03/31/25 0400   Number of days: 1            Urethral Catheter 03/29/25 1400 (Active)   Site Assessment Intact;Clean;Dry 03/30/25 2000   Collection Container Urimeter 03/30/25 2000   Securement Method secured to top of thigh w/ adhesive device 03/30/25 2000   Reason for Continuing Urinary Catheterization Post operative 03/30/25 2000   CAUTI Prevention Bundle Securement " "Device in place with 1" clintck 03/30/25 2000   Output (mL) 800 mL 03/31/25 0600   Number of days: 1       Physical examination:  Gen: NAD, AAOx3, answering questions appropriately  HEENT: NC  CV: RR  Resp: NWOB  Abd: S/NT; distended  Ext: moving all extremities spontaneously and purposefully  Neuro: CN II-XII grossly intact  Skin/wounds: venous bleed from upper midline port-site sutured on exam w/ surgicell placed     Labs:  Renal:  Recent Labs     03/28/25 2154 03/29/25  0532   BUN 13.8 13.3   CREATININE 1.23* 1.07*     No results for input(s): "LACTIC" in the last 72 hours.  FENGI:  Recent Labs     03/28/25 2154 03/29/25  0532    141   K 4.1 3.9    106   CO2 25 24   CALCIUM 9.8 8.6   ALBUMIN 3.8  --    BILITOT 0.5  --    AST 36  --    ALKPHOS 158*  --    ALT 15  --      Heme:  Recent Labs     03/28/25 2154 03/29/25  0532 03/31/25  0434   HGB 15.1 13.5 12.1   HCT 45.8 42.3 36.5*    143 129*     ID:  Recent Labs     03/28/25 2154 03/29/25  0532 03/31/25  0434   WBC 9.93 6.89 6.37     CBG:  Recent Labs     03/28/25 2154 03/29/25  0532   GLUCOSE 97 80*      Cardiovascular:  No results for input(s): "TROPONINI", "CKTOTAL", "CKMB", "BNP" in the last 168 hours.  ABG:  No results for input(s): "PH", "PO2", "PCO2", "HCO3", "BE" in the last 168 hours.   I have reviewed all pertinent lab results within the past 24 hours.    Imaging:  X-Ray Abdomen AP 1 View   Final Result      Moderate amount of stool in the right colon.         Electronically signed by: Fidel Peter MD   Date:    03/30/2025   Time:    19:25      CT Abdomen Pelvis With IV Contrast NO Oral Contrast   Final Result      1. Multiple stable non calcified nodules in the image lung base with range measurement of 4to 8mm, the largest of which is at the posterior right lower lobe on image 2 series 2. This is of concern for metastasis. No focal infiltrate or consolidation is seen.   2. Numerous low attenuating foci in the spleen. This is of " concern for metastasis.   3. Mesenteric swirl is seen adjacent to the 3rd segment of the duodenum. The superior mesenteric vein swirls around the superior mesenteric artery. The proximal jejunum is seen directed towards the right with associated mild wall thickening. 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 Correlate clinically as regards additional evaluation and follow-up.   4. Details and other findings as discussed above.   5. The findings are concordant with the [facility] report.         Electronically signed by: Casey Reardon   Date:    2025   Time:    08:27         I have reviewed all pertinent imaging results/findings within the past 24 hours.    Micro/Path/Other:  Microbiology Results (last 7 days)       ** No results found for the last 168 hours. **           Specimens (From admission, onward)      None           Pathology Results  (Last 365 days)                 25 0957  Specimen to Pathology Final result    Narrative:  Patient - ALFREDA MERCHANT                     - 1940 Sex - F   Med Rec # - [id number]                              Dr Ruben Gordillo, MD [place]   The following is an electronic copy of report # [id number] from:   [facility]   [address] [place] [address]   Phone [phone]   DIAGNOSIS:   2025 EEL:fc   1. STOMACH, ANTRUM, BIOPSY:   - MINIMAL CHRONIC INACTIVE GASTRITIS.   - NEGATIVE FOR INTESTINAL METAPLASIA, DYSPLASIA, OR MALIGNANCY.   - NEGATIVE FOR HELICOBACTER ORGANISMS BASED ON H and amp;E EVALUATION.   2. DUODENUM, BIOPSY:   - BENIGN DUODENAL TYPE MUCOSA WITH NO SIGNIFICANT HISTOPATHOLOGICAL    FINDINGS.   - NEGATIVE FOR VILLOUS BLUNTING AND NEGATIVE FOR INCREASED IN    INTRAEPITHELIAL LYMPHOCYTES.  "_______________________________________________________________________________________________________   SPECIMEN AND SOURCE:   1. ANTRUM   2. DUODENUM - CELIAC   CLINICAL INFORMATION:   EARLY SATIETY   EPIGASTRIC PAIN   GROSS EXAMINATION:   02/11/2025 TB/WPL   1. Received in a formalin filled container labeled with the patient's    name and MRN designated "antrum" are 3 fragments of tan-brown tissue    measuring 0.1 to 0.2 cm in greatest dimension. The specimen is entirely    submitted in cassette 1A.   2. Received in a formalin filled container labeled with the patient's    name and MRN designated "duodenum, celiac" are 2 fragments of tan-brown    tissue measuring 0.2 and 0.3 cm in greatest dimension. The specimen is    entirely submitted in cassette 2A.   MICROSCOPIC DESCRIPTION:   Unless gross only is specified, the diagnosis for each specimen is based    on a microscopic examination of sections of tissue.   CODE: 0   Pathologist: Katherine Maldonado MD (Electronic Signature) 02/12/2025 11:16    AM .   [facility] [place] * [address] *    [place] [address].   Technical services performed at the following location(s): [facility] * [address] [place] [address] * [place] [address].   Gross examination performed at: [facility] * [address] [place] [address] * [place] [phone].   End of Report                Assessment & Plan:   84F w/ HTN, MI (2019), CAD s/p stent (EF 55-60% 04/2024), CKD 3, w/ significant PSH here w/ abdominal pain and CT showing internal hernia now s/p lap to open JORGE (3/29). PSH of appendectomy, colon resection for diverticulitis, hysterectomy, mesh and sling for bladder incontinence    - continue NPO  - daily labs   - prn antihypertensives  - MMPC  - DVT ppx - lovenox 30 mg qD  - PT/OT    Disposition/Outpatient Follow Up/ Referrals/ Discharge Instructions:   - Disposition: Continue inpatient stay      Lio Frost MD  [facility] General " Surgery PGY1

## 2025-04-01 LAB
HCT VFR BLD AUTO: 36.2 % (ref 37–47)
HGB BLD-MCNC: 12 G/DL (ref 12–16)

## 2025-04-01 PROCEDURE — 36415 COLL VENOUS BLD VENIPUNCTURE: CPT

## 2025-04-01 PROCEDURE — 63600175 PHARM REV CODE 636 W HCPCS

## 2025-04-01 PROCEDURE — 25000003 PHARM REV CODE 250: Performed by: STUDENT IN AN ORGANIZED HEALTH CARE EDUCATION/TRAINING PROGRAM

## 2025-04-01 PROCEDURE — 11000001 HC ACUTE MED/SURG PRIVATE ROOM

## 2025-04-01 PROCEDURE — 63600175 PHARM REV CODE 636 W HCPCS: Performed by: STUDENT IN AN ORGANIZED HEALTH CARE EDUCATION/TRAINING PROGRAM

## 2025-04-01 PROCEDURE — 25000003 PHARM REV CODE 250

## 2025-04-01 PROCEDURE — 99233 SBSQ HOSP IP/OBS HIGH 50: CPT | Mod: GC,,, | Performed by: STUDENT IN AN ORGANIZED HEALTH CARE EDUCATION/TRAINING PROGRAM

## 2025-04-01 PROCEDURE — 85018 HEMOGLOBIN: CPT

## 2025-04-01 RX ORDER — DIPHENHYDRAMINE HYDROCHLORIDE 50 MG/ML
25 INJECTION, SOLUTION INTRAMUSCULAR; INTRAVENOUS EVERY 6 HOURS PRN
Status: DISCONTINUED | OUTPATIENT
Start: 2025-04-01 | End: 2025-04-04 | Stop reason: HOSPADM

## 2025-04-01 RX ORDER — DIPHENHYDRAMINE HYDROCHLORIDE 50 MG/ML
25 INJECTION, SOLUTION INTRAMUSCULAR; INTRAVENOUS ONCE
Status: COMPLETED | OUTPATIENT
Start: 2025-04-01 | End: 2025-04-01

## 2025-04-01 RX ADMIN — ENOXAPARIN SODIUM 30 MG: 30 INJECTION SUBCUTANEOUS at 05:04

## 2025-04-01 RX ADMIN — NIFEDIPINE 60 MG: 60 TABLET, FILM COATED, EXTENDED RELEASE ORAL at 08:04

## 2025-04-01 RX ADMIN — OXYCODONE HYDROCHLORIDE 10 MG: 10 TABLET ORAL at 02:04

## 2025-04-01 RX ADMIN — CARVEDILOL 12.5 MG: 12.5 TABLET, FILM COATED ORAL at 10:04

## 2025-04-01 RX ADMIN — DIPHENHYDRAMINE HYDROCHLORIDE 25 MG: 50 INJECTION INTRAMUSCULAR; INTRAVENOUS at 09:04

## 2025-04-01 RX ADMIN — SODIUM CHLORIDE, POTASSIUM CHLORIDE, SODIUM LACTATE AND CALCIUM CHLORIDE: 600; 310; 30; 20 INJECTION, SOLUTION INTRAVENOUS at 04:04

## 2025-04-01 RX ADMIN — DIPHENHYDRAMINE HYDROCHLORIDE 25 MG: 50 INJECTION INTRAMUSCULAR; INTRAVENOUS at 12:04

## 2025-04-01 RX ADMIN — MUPIROCIN: 20 OINTMENT TOPICAL at 08:04

## 2025-04-01 RX ADMIN — CARVEDILOL 12.5 MG: 12.5 TABLET, FILM COATED ORAL at 09:04

## 2025-04-01 RX ADMIN — SODIUM CHLORIDE, POTASSIUM CHLORIDE, SODIUM LACTATE AND CALCIUM CHLORIDE: 600; 310; 30; 20 INJECTION, SOLUTION INTRAVENOUS at 12:04

## 2025-04-01 RX ADMIN — MUPIROCIN: 20 OINTMENT TOPICAL at 09:04

## 2025-04-01 NOTE — PROGRESS NOTES
Inpatient Nutrition Assessment    Admit Date: 3/28/2025   Total duration of encounter: 4 days   Patient Age: 84 y.o.    Nutrition Recommendation/Prescription     Advance diet as tolerated per MD. Goal Diet: Low Residue Diet.  If unable to advance diet in the next 1-2 days, consider PPN. Recommend Clinimix E 4.25/5 @ 85 mL/hr with IVPB IL 20% 250 mL 3x/week.   Monitor wt and labs. Replete K as medically feasible.     Communication of Recommendations:  EMR    Nutrition Assessment     Malnutrition Assessment/Nutrition-Focused Physical Exam       Malnutrition Level: other (see comments) (Unable to assess) (04/01/25 1009)  Energy Intake (Malnutrition): less than or equal to 50% for greater than or equal to 5 days (04/01/25 1009)  Weight Loss (Malnutrition): other (see comments) (Does not meet criteria) (04/01/25 1009)                 Scientologist Region (Muscle Loss): other (see comments) (unable to assess)                                A minimum of two characteristics is recommended for diagnosis of either severe or non-severe malnutrition.    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 JORGE (3/29)     Relevant Medical History:   Arthritis   CKD (chronic kidney disease) stage 3, GFR 30-59 ml/min   Digestive disorder   Essential (primary) hypertension   Leukemia   MI (myocardial infarction)       Scheduled Medications:  carvediloL, 12.5 mg, BID  enoxparin, 30 mg, Q24H (prophylaxis, 1700)  mupirocin, , BID  NIFEdipine, 60 mg, Daily  silver nitrate applicators, 3 applicator, Once    Continuous Infusions:  lactated ringers, Last Rate: 125 mL/hr at 04/01/25 1084    PRN Medications:  artificial tears, 1 drop, PRN  hydrALAZINE, 10 mg, Q6H PRN  HYDROmorphone, 0.5 mg, Q6H PRN  LIDOcaine HCL 10 mg/ml (1%), 1 mL,  "Once PRN  methocarbamoL, 500 mg, TID PRN  ondansetron, 4 mg, Q6H PRN  ondansetron, 4 mg, Q6H PRN  oxyCODONE, 10 mg, Q6H PRN  oxyCODONE, 10 mg, Q6H PRN  sodium chloride 0.9%, 10 mL, PRN    Calorie Containing IV Medications: no significant kcals from medications at this time    Recent Labs   Lab 03/28/25  2154 03/29/25  0532 03/31/25  0434 03/31/25  0803 04/01/25  0005    141  --  142  --    K 4.1 3.9  --  3.3*  --    CALCIUM 9.8 8.6  --  8.1*  --     106  --  109*  --    CO2 25 24  --  23  --    BUN 13.8 13.3  --  10.7  --    CREATININE 1.23* 1.07*  --  0.81  --    EGFRNORACEVR 44 52  --  >60  --    GLUCOSE 97 80*  --  69*  --    BILITOT 0.5  --   --   --   --    ALKPHOS 158*  --   --   --   --    ALT 15  --   --   --   --    AST 36  --   --   --   --    ALBUMIN 3.8  --   --   --   --    LIPASE 82*  --   --   --   --    WBC 9.93 6.89 6.37  --   --    HGB 15.1 13.5 12.1  --  12.0   HCT 45.8 42.3 36.5*  --  36.2*     Nutrition Orders:  Diet NPO Except for: Medication      Appetite/Oral Intake: NPO/NPO  Factors Affecting Nutritional Intake: NPO  Social Needs Impacting Access to Food: unable to assess at this time; will attempt on follow-up  Food/Samaritan/Cultural Preferences: unable to obtain  Food Allergies: shellfish and latex  Last Bowel Movement: 03/28/25  Wound(s):  surgical incision     Comments    4/1/25: Pt NPO; has been NPO/Clear liquids since admit; unable to speak with pt;  NFPE on f/u.     Anthropometrics    Height: 5' 1" (154.9 cm), Height Method: Stated  Last Weight: 88.5 kg (195 lb 1.7 oz) (03/29/25 1606), Weight Method: Standard Scale  BMI (Calculated): 36.9  BMI Classification: obese grade II (BMI 35-39.9)     Ideal Body Weight (IBW), Female: 105 lb     % Ideal Body Weight, Female (lb): 185.82 %                             Usual Weight Provided By: EMR weight history    Wt Readings from Last 5 Encounters:   03/29/25 88.5 kg (195 lb 1.7 oz)   10/26/24 89.4 kg (197 lb)   08/13/24 90.7 kg (200 " lb)   08/13/24 92.1 kg (203 lb)   07/25/24 92.1 kg (203 lb)     Weight Change(s) Since Admission:   Wt Readings from Last 1 Encounters:   03/29/25 1606 88.5 kg (195 lb 1.7 oz)   03/28/25 2144 88.5 kg (195 lb)   Admit Weight: 88.5 kg (195 lb) (03/28/25 2144), Weight Method: Stated    Estimated Needs    Weight Used For Calorie Calculations: 88.5 kg (195 lb 1.7 oz)  Energy Calorie Requirements (kcal): 1667 kcal (MSJ x 1.3 SF)  Energy Need Method: [place]-St Jeor  Weight Used For Protein Calculations: 88.5 kg (195 lb 1.7 oz)  Protein Requirements: 106 gm (1.2g/kg)  Fluid Requirements (mL): 2655 mL (30mL/kg)        Enteral Nutrition     Patient not receiving enteral nutrition at this time.    Parenteral Nutrition     Patient not receiving parenteral nutrition support at this time.    Evaluation of Received Nutrient Intake    Calories: not meeting estimated needs  Protein: not meeting estimated needs    Patient Education     Not applicable.    Nutrition Diagnosis     PES: Inadequate oral intake related to acute illness as evidenced by NPO/Clear Liquid diet since admit. (new)     PES:            Nutrition Interventions     Intervention(s): modified composition of parenteral nutrition, modified rate of parenteral nutrition, and collaboration with other providers  Intervention(s): Care coordination or referral;Parenteral nutrition or supplemental parenteral nutrition    Goal: Meet greater than 80% of nutritional needs by follow-up. (new)  Goal: Maintain weight throughout hospitalization. (new)    Nutrition Goals & Monitoring     Dietitian will monitor: energy intake and weight  Discharge planning: too early to determine; pending clinical course  Nutrition Risk/Follow-Up: high (follow-up in 1-4 days)   Please consult if re-assessment needed sooner.

## 2025-04-01 NOTE — PROGRESS NOTES
"   Acute Care Surgery   Progress Note  Admit Date: 3/28/2025  HD#3  POD#3 Days Post-Op    Subjective:   Interval history:  Af, vss  Epigastric incision opened overnight, stitches replaced  Reports continuing epigastric pain from hematoma  Unable to tolerate diet due to feeling of something getting stuck    Home Meds:  Current Outpatient Medications   Medication Instructions    aspirin 81 mg, Oral, Daily    carvediloL (COREG) 12.5 mg, Oral, 2 times daily    co-enzyme Q-10 30 mg, Daily    cranberry 500 mg Cap 1 capsule, Daily    furosemide (LASIX) 20 mg, Daily PRN    NIFEdipine (ADALAT CC) 60 mg, Daily    omega-3 fatty acids/fish oil (FISH OIL-OMEGA-3 FATTY ACIDS) 300-1,000 mg capsule Daily    pantoprazole (PROTONIX) 40 mg, Oral, Daily    vitamin D (VITAMIN D3) 1,000 Units, Daily      Scheduled Meds:   carvediloL  12.5 mg Oral BID    enoxparin  30 mg Subcutaneous Q24H (prophylaxis, 1700)    mupirocin   Nasal BID    NIFEdipine  60 mg Oral Daily    silver nitrate applicators  3 applicator Topical (Top) Once     Continuous Infusions:   lactated ringers   Intravenous Continuous 125 mL/hr at 04/01/25 0414 New Bag at 04/01/25 0414     PRN Meds:  Current Facility-Administered Medications:     artificial tears, 1 drop, Both Eyes, PRN    hydrALAZINE, 10 mg, Intravenous, Q6H PRN    HYDROmorphone, 0.5 mg, Intravenous, Q6H PRN    LIDOcaine HCL 10 mg/ml (1%), 1 mL, Intradermal, Once PRN    methocarbamoL, 500 mg, Oral, TID PRN    ondansetron, 4 mg, Oral, Q6H PRN    ondansetron, 4 mg, Intravenous, Q6H PRN    oxyCODONE, 10 mg, Oral, Q6H PRN    oxyCODONE, 10 mg, Oral, Q6H PRN    sodium chloride 0.9%, 10 mL, Intravenous, PRN     Objective:     VITAL SIGNS: 24 HR MIN & MAX LAST   Temp  Min: 97.9 °F (36.6 °C)  Max: 98.5 °F (36.9 °C)  98 °F (36.7 °C)   BP  Min: 145/69  Max: 175/75  (!) 154/88    Pulse  Min: 66  Max: 83  72    Resp  Min: 18  Max: 20  18    SpO2  Min: 93 %  Max: 96 %  (!) 93 %      HT: 5' 1" (154.9 cm)  WT: 88.5 kg (195 lb " 1.7 oz)  BMI: 36.9     Intake/output:  Intake/Output - Last 3 Shifts         03/30 0700  03/31 0659 03/31 0700 04/01 0659 04/01 0700  04/02 0659    P.O. 840 0     I.V. (mL/kg)       IV Piggyback       Total Intake(mL/kg) 840 (9.5) 0 (0)     Urine (mL/kg/hr) 4000 (1.9) 1200 (0.6)     Stool 0 0     Total Output 4000 1200     Net -3160 -1200            Urine Occurrence  2 x     Stool Occurrence 0 x 0 x             Intake/Output Summary (Last 24 hours) at 4/1/2025 0747  Last data filed at 4/1/2025 0600  Gross per 24 hour   Intake 0 ml   Output 1200 ml   Net -1200 ml           Lines/drains/airway:       Peripheral IV - Single Lumen 03/28/25 2159 20 G Anterior;Right Forearm (Active)   Site Assessment Clean;Dry;Intact 03/31/25 2000   Line Securement Device Secured with sutureless device 03/31/25 0800   Extremity Assessment Distal to IV No abnormal discoloration;No redness;No swelling;No warmth 03/31/25 2000   Line Status Infusing 03/31/25 2000   Dressing Status Clean;Dry;Intact 03/31/25 2000   Dressing Intervention Integrity maintained 03/31/25 2000   Number of days: 3            Peripheral IV - Single Lumen 03/29/25 1056 20 G Right Hand (Active)   Site Assessment Clean;Dry;Intact 03/31/25 2000   Line Securement Device Secured with sutureless device 03/31/25 0800   Extremity Assessment Distal to IV No abnormal discoloration;No redness;No swelling;No warmth 03/31/25 2000   Line Status Saline locked 03/31/25 2000   Dressing Status Clean;Dry;Intact 03/31/25 2000   Dressing Intervention Integrity maintained 03/31/25 2000   Number of days: 2       Physical examination:  Gen: NAD, AAOx3, answering questions appropriately  HEENT: JOSEPH  CV: RR  Resp: NWOB  Abd: S//ND. Tenderness in epigastric/periumbilical region. Large tender hematoma under epigastric incision.   Ext: moving all extremities spontaneously and purposefully  Neuro: CN II-XII grossly intact    Labs:  Renal:  Recent Labs     03/31/25  0803   BUN 10.7   CREATININE  "0.81     No results for input(s): "LACTIC" in the last 72 hours.  FENGI:  Recent Labs     03/31/25  0803      K 3.3*   *   CO2 23   CALCIUM 8.1*     Heme:  Recent Labs     03/31/25  0434 04/01/25  0005   HGB 12.1 12.0   HCT 36.5* 36.2*   *  --      ID:  Recent Labs     03/31/25 0434   WBC 6.37     CBG:  Recent Labs     03/31/25  0803   GLUCOSE 69*      Cardiovascular:  No results for input(s): "TROPONINI", "CKTOTAL", "CKMB", "BNP" in the last 168 hours.  ABG:  No results for input(s): "PH", "PO2", "PCO2", "HCO3", "BE" in the last 168 hours.   I have reviewed all pertinent lab results within the past 24 hours.    Imaging:  X-Ray Abdomen AP 1 View   Final Result      Moderate amount of stool in the right colon.         Electronically signed by: Fdiel Peter MD   Date:    03/30/2025   Time:    19:25      CT Abdomen Pelvis With IV Contrast NO Oral Contrast   Final Result      1. Multiple stable non calcified nodules in the image lung base with range measurement of 4to 8mm, the largest of which is at the posterior right lower lobe on image 2 series 2. This is of concern for metastasis. No focal infiltrate or consolidation is seen.   2. Numerous low attenuating foci in the spleen. This is of concern for metastasis.   3. Mesenteric swirl is seen adjacent to the 3rd segment of the duodenum. The superior mesenteric vein swirls around the superior mesenteric artery. The proximal jejunum is seen directed towards the right with associated mild wall thickening. 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 Correlate clinically as regards additional evaluation and follow-up.   4. Details and other findings as discussed above.   5. The findings are concordant with the [facility] report.         Electronically signed by: Casey Reardon   Date:    03/29/2025 "   Time:    08:27         I have reviewed all pertinent imaging results/findings within the past 24 hours.    Micro/Path/Other:  Microbiology Results (last 7 days)       ** No results found for the last 168 hours. **           Pathology Results  (Last 7 days)      None             Assessment & Plan:   84F w/ HTN, MI (2019), CAD s/p stent (EF 55-60% 04/2024), CKD 3, w/ significant PSH here w/ abdominal pain and CT showing internal hernia now s/p lap to open JORGE (3/29). PSH of appendectomy, colon resection for diverticulitis, hysterectomy, mesh and sling for bladder incontinence. Course complicated by persistent symptomatic port site hematoma     - continue NPO, will discuss with staff if patient requires operative hematoma evacuation  - daily labs   - prn antihypertensives  - MMPC  - DVT ppx - lovenox 30 mg qD  - PT/OT    Shyam Shannon MD  [facility] General Surgery, PGY-2  04/01/2025

## 2025-04-01 NOTE — PLAN OF CARE
Called to bedside to evaluate oozing from port site incision umbilicus near midline mini-laparotomy incision. Noted to have slow persistent oozing, port site probed and resutured. Patient noted to have swelling of the upper abdomen surrounding superior port site (increased from day shift per nursing), tender to palpation and the development of new petechial rash. Though recently started on a clear liquid diet, patient reports that she feels unable to eat because she feels like something is stuck and pressing. Benadryl administered for rash. Check spot H&H. Port site prepped cleaned and resutured. Npo at midnight. Will discuss with staff in AM, possible I&D in OR tomorrow. Please call surgery team with any changes in the exam of this patient.     Mildred Moreno MD  [facility] General Surgery, PGY-2

## 2025-04-01 NOTE — OP NOTE
Date of Procedure: 3/29/2025      Procedure: Procedure(s) (LRB):  LAPAROSCOPY, DIAGNOSTIC (N/A) , extensive laparoscopic lysis of adhesions greater than 1 hour, exploratory laparotomy     Surgeons and Role:     * Casey Maradiaga MD - Primary     Assisting Surgeon:  Mildred Moreno MD, general surgery resident     Indications: This is a 84-year-old female with prior lower midline exploratory laparotomy incision, she presented to the hospital with obstipation and abdominal pain.  CT scan demonstrated what appeared to be a small-bowel obstruction with small bowel volvulus, we decided taken to the operating room for management     Pre-Operative Diagnosis:  Small-bowel obstruction with possible small bowel volvulus     Post-Operative Diagnosis:  Extensive intra-abdominal intestinal and omental adhesions, small bowel volvulus   Anesthesia: General     Operative Findings (including complications, if any): small bowel with many dense adhesions to the anterior abdominal wall causing small bowel volvulus, transition point found the anterior abdominal wall through small incisional hernia defect     Description of Technical Procedures:  Patient was brought to the operating room laid supine on the operating table, general anesthesia was administered she was intubated endotracheally     The abdomen was prepped and draped usual sterile fashion     A 5 mm stab incision was made in the patient's right upper quadrant through which a Veress needle was placed and pneumoperitoneum was achieved     Then a 5 mm Optiview trocar was placed in the patient's right upper quadrant, for additional 5 mm ports were placed under direct endoscopic vision     Attention was turned towards the intestinal adhesions that were mostly focused in the lower portion of the abdomen with the main adhesion point being along the patient's midline.  There was indeed a loop segment of dilated small intestine that appeared to be originating  from the midline anterior abdominal wall through a small ventral hernia defect.    There were many many dense adhesions besides the volvulized segment, laparoscopic endo Shear scissors were used to perform extensive adhesiolysis for more than an hour     The small intestine was very densely adhesed to the abdominal wall in many places mostly along the patient's midline     Ultimately I was able to locate a 2 cm ventral hernia defect along the patient's midline which appeared to be the fixation point causing the small bowel volvulus     This was taken down as well     There appeared to be no enterotomies or injury to the small intestine during this extensive at least adhesiolysis however because of the density of the adhesions in the very close nature of the small bowel to the abdominal wall, I decided to make a midline laparotomy incision using 10. Skin knife so that I could manually inspect the bowel and correct any enterotomies if present     Once the laparoscopic adhesiolysis was complete, I made a midline laparotomy incision about 6 cm superior to the umbilicus in 6 cm inferior to the umbilicus     Medium-size Lance retractor was placed, the small bowel was run in its entirety, all of the areas that had been taken down of the small intestine were inspected very closely and I search for NT any injuries to the small intestine, I found no enterotomies and no areas requiring suturing     The midline abdominal incision was closed with a running loop PDS suture    The skin was closed with 4-0 subcuticular Vicryl sutures and sterile dressings     Patient tolerated procedure well was extubated and brought to the PACU in stable satisfactory condition thank you     Significant Surgical Tasks Conducted by the Assistant(s), if Applicable: see operative dictation     Estimated Blood Loss (EBL): * No values recorded between 3/29/2025 10:32 AM and 3/29/2025  1:43 PM *           Implants: * No implants in log *     Specimens:   None  Specimen (24h ago, onward)        None             * No specimens in log *           Condition: Good     Disposition: PACU - hemodynamically stable.

## 2025-04-02 LAB
BASOPHILS # BLD AUTO: 0.03 X10(3)/MCL
BASOPHILS NFR BLD AUTO: 0.6 %
EOSINOPHIL # BLD AUTO: 0.18 X10(3)/MCL (ref 0–0.9)
EOSINOPHIL NFR BLD AUTO: 3.7 %
ERYTHROCYTE [DISTWIDTH] IN BLOOD BY AUTOMATED COUNT: 14 % (ref 11.5–17)
HCT VFR BLD AUTO: 31.7 % (ref 37–47)
HGB BLD-MCNC: 10.5 G/DL (ref 12–16)
IMM GRANULOCYTES # BLD AUTO: 0.01 X10(3)/MCL (ref 0–0.04)
IMM GRANULOCYTES NFR BLD AUTO: 0.2 %
LYMPHOCYTES # BLD AUTO: 1.16 X10(3)/MCL (ref 0.6–4.6)
LYMPHOCYTES NFR BLD AUTO: 24.1 %
MCH RBC QN AUTO: 29.2 PG (ref 27–31)
MCHC RBC AUTO-ENTMCNC: 33.1 G/DL (ref 33–36)
MCV RBC AUTO: 88.3 FL (ref 80–94)
MONOCYTES # BLD AUTO: 0.27 X10(3)/MCL (ref 0.1–1.3)
MONOCYTES NFR BLD AUTO: 5.6 %
NEUTROPHILS # BLD AUTO: 3.16 X10(3)/MCL (ref 2.1–9.2)
NEUTROPHILS NFR BLD AUTO: 65.8 %
NRBC BLD AUTO-RTO: 0 %
PLATELET # BLD AUTO: 142 X10(3)/MCL (ref 130–400)
PLATELETS.RETICULATED NFR BLD AUTO: 2.8 % (ref 0.9–11.2)
PMV BLD AUTO: 10.8 FL (ref 7.4–10.4)
RBC # BLD AUTO: 3.59 X10(6)/MCL (ref 4.2–5.4)
WBC # BLD AUTO: 4.81 X10(3)/MCL (ref 4.5–11.5)

## 2025-04-02 PROCEDURE — 36415 COLL VENOUS BLD VENIPUNCTURE: CPT | Performed by: STUDENT IN AN ORGANIZED HEALTH CARE EDUCATION/TRAINING PROGRAM

## 2025-04-02 PROCEDURE — 25000003 PHARM REV CODE 250: Performed by: STUDENT IN AN ORGANIZED HEALTH CARE EDUCATION/TRAINING PROGRAM

## 2025-04-02 PROCEDURE — 99233 SBSQ HOSP IP/OBS HIGH 50: CPT | Mod: GC,,, | Performed by: STUDENT IN AN ORGANIZED HEALTH CARE EDUCATION/TRAINING PROGRAM

## 2025-04-02 PROCEDURE — 63600175 PHARM REV CODE 636 W HCPCS: Performed by: STUDENT IN AN ORGANIZED HEALTH CARE EDUCATION/TRAINING PROGRAM

## 2025-04-02 PROCEDURE — 85025 COMPLETE CBC W/AUTO DIFF WBC: CPT | Performed by: STUDENT IN AN ORGANIZED HEALTH CARE EDUCATION/TRAINING PROGRAM

## 2025-04-02 PROCEDURE — 11000001 HC ACUTE MED/SURG PRIVATE ROOM

## 2025-04-02 PROCEDURE — 63600175 PHARM REV CODE 636 W HCPCS

## 2025-04-02 PROCEDURE — 25000003 PHARM REV CODE 250: Performed by: SURGERY

## 2025-04-02 RX ORDER — MICONAZOLE NITRATE 2 G/100G
POWDER TOPICAL 2 TIMES DAILY
Status: DISCONTINUED | OUTPATIENT
Start: 2025-04-02 | End: 2025-04-04 | Stop reason: HOSPADM

## 2025-04-02 RX ADMIN — SODIUM CHLORIDE, POTASSIUM CHLORIDE, SODIUM LACTATE AND CALCIUM CHLORIDE: 600; 310; 30; 20 INJECTION, SOLUTION INTRAVENOUS at 10:04

## 2025-04-02 RX ADMIN — NIFEDIPINE 60 MG: 60 TABLET, FILM COATED, EXTENDED RELEASE ORAL at 09:04

## 2025-04-02 RX ADMIN — MUPIROCIN: 20 OINTMENT TOPICAL at 09:04

## 2025-04-02 RX ADMIN — CARVEDILOL 12.5 MG: 12.5 TABLET, FILM COATED ORAL at 09:04

## 2025-04-02 RX ADMIN — SODIUM CHLORIDE, POTASSIUM CHLORIDE, SODIUM LACTATE AND CALCIUM CHLORIDE: 600; 310; 30; 20 INJECTION, SOLUTION INTRAVENOUS at 05:04

## 2025-04-02 RX ADMIN — MICONAZOLE NITRATE: 20 POWDER TOPICAL at 09:04

## 2025-04-02 RX ADMIN — ENOXAPARIN SODIUM 30 MG: 30 INJECTION SUBCUTANEOUS at 06:04

## 2025-04-02 RX ADMIN — DIPHENHYDRAMINE HYDROCHLORIDE 25 MG: 50 INJECTION INTRAMUSCULAR; INTRAVENOUS at 09:04

## 2025-04-02 NOTE — CONSULTS
[facility] - 8th Floor Med Surg  Wound Care    Patient Name:  Nell Mock   MRN:  [id number]  Date: 4/2/2025  Diagnosis: <principal problem not specified>    History:     Past Medical History:   Diagnosis Date    Arthritis     CKD (chronic kidney disease) stage 3, GFR 30-59 ml/min     Digestive disorder     Essential (primary) hypertension     Leukemia     in remission    MI (myocardial infarction)        Social History[1]    Precautions:     Allergies as of 03/28/2025 - Reviewed 03/28/2025   Allergen Reaction Noted    Latex Swelling 02/18/2016    Benazepril  01/03/2018    Crestor [rosuvastatin]  09/13/2023    Amoxicillin-pot clavulanate  01/03/2018    Ciprofloxacin Rash 02/18/2016    Shellfish containing products Nausea Only 03/30/2023    Sulfamethoxazole-trimethoprim Itching and Other (See Comments) 02/18/2016       WO Assessment Details/Treatment        04/02/25 1529   WOCN Assessment   Visit Date 04/02/25   Visit Time 1529   Consult Type New   [facility] Speciality Wound   Wound moisture   Intervention chart review;assessed;orders   Teaching on-going        Wound 04/02/25 0800 Moisture associated dermatitis Right lateral Upper quadrant   Date First Assessed/Time First Assessed: 04/02/25 0800   Primary Wound Type: Moisture associated dermatitis  Side: Right  Orientation: lateral  Location: Upper quadrant   Wound Image    Dressing Appearance Open to air   Drainage Amount None   Drainage Characteristics/Odor No odor   Appearance Pink;Red;Moist   Tissue loss description Not applicable   Periwound Area Moist;Redness   Care Cleansed with:;Soap and water     WOCN consulted for breast and abdomen folds. No family at bedside. Explained reason for visit. Treatment recommendations: breast folds/abdominal folds/bilateral groins: clean with remedy cleanser, dry well, apply miconazole powder to areas of redness, apply a dryer sheet from purple pack to area. BID/prn if needed. Nursing to cont. Tx recs and  preventative measures. Will follow up.     04/02/2025         [1]   Social History  Socioeconomic History    Marital status:    Tobacco Use    Smoking status: Never    Smokeless tobacco: Never   Substance and Sexual Activity    Alcohol use: Yes     Comment: wine yearly    Drug use: Never    Sexual activity: Not Currently     Partners: Male     Social Drivers of Health     Financial Resource Strain: Patient Declined (3/29/2025)    Overall Financial Resource Strain (CARDIA)     Difficulty of Paying Living Expenses: Patient declined   Food Insecurity: Patient Declined (3/29/2025)    Hunger Vital Sign     Worried About Running Out of Food in the Last Year: Patient declined     Ran Out of Food in the Last Year: Patient declined   Transportation Needs: Patient Declined (3/29/2025)    PRAPARE - Transportation     Lack of Transportation (Medical): Patient declined     Lack of Transportation (Non-Medical): Patient declined   Stress: Patient Declined (3/29/2025)    [employer] - Occupational Stress Questionnaire     Feeling of Stress : Patient declined   Housing Stability: Patient Declined (3/29/2025)    Housing Stability Vital Sign     Unable to Pay for Housing in the Last Year: Patient declined     Homeless in the Last Year: Patient declined

## 2025-04-02 NOTE — PROGRESS NOTES
"   Acute Care Surgery   Progress Note  Admit Date: 3/28/2025  HD#4  POD#4 Days Post-Op    Subjective:   Interval history:  Af,vss  Tolerating CLD a little better  Still reports pain in rectus hematoma area  +BM    Home Meds:  Current Outpatient Medications   Medication Instructions    aspirin 81 mg, Oral, Daily    carvediloL (COREG) 12.5 mg, Oral, 2 times daily    co-enzyme Q-10 30 mg, Daily    cranberry 500 mg Cap 1 capsule, Daily    furosemide (LASIX) 20 mg, Daily PRN    NIFEdipine (ADALAT CC) 60 mg, Daily    omega-3 fatty acids/fish oil (FISH OIL-OMEGA-3 FATTY ACIDS) 300-1,000 mg capsule Daily    pantoprazole (PROTONIX) 40 mg, Oral, Daily    vitamin D (VITAMIN D3) 1,000 Units, Daily      Scheduled Meds:   carvediloL  12.5 mg Oral BID    enoxparin  30 mg Subcutaneous Q24H (prophylaxis, 1700)    mupirocin   Nasal BID    NIFEdipine  60 mg Oral Daily    silver nitrate applicators  3 applicator Topical (Top) Once     Continuous Infusions:   lactated ringers   Intravenous Continuous 125 mL/hr at 04/02/25 0514 New Bag at 04/02/25 0514     PRN Meds:  Current Facility-Administered Medications:     artificial tears, 1 drop, Both Eyes, PRN    diphenhydrAMINE, 25 mg, Intravenous, Q6H PRN    hydrALAZINE, 10 mg, Intravenous, Q6H PRN    HYDROmorphone, 0.5 mg, Intravenous, Q6H PRN    LIDOcaine HCL 10 mg/ml (1%), 1 mL, Intradermal, Once PRN    methocarbamoL, 500 mg, Oral, TID PRN    ondansetron, 4 mg, Oral, Q6H PRN    ondansetron, 4 mg, Intravenous, Q6H PRN    oxyCODONE, 10 mg, Oral, Q6H PRN    oxyCODONE, 10 mg, Oral, Q6H PRN    sodium chloride 0.9%, 10 mL, Intravenous, PRN     Objective:     VITAL SIGNS: 24 HR MIN & MAX LAST   Temp  Min: 98 °F (36.7 °C)  Max: 98.9 °F (37.2 °C)  98 °F (36.7 °C)   BP  Min: 122/66  Max: 155/73  122/66    Pulse  Min: 57  Max: 70  61    Resp  Min: 16  Max: 18  18    SpO2  Min: 93 %  Max: 96 %  95 %      HT: 5' 1" (154.9 cm)  WT: 88.5 kg (195 lb 1.7 oz)  BMI: 36.9     Intake/output:  Intake/Output - " "Last 3 Shifts         03/31 0700 04/01 0659 04/01 0700 04/02 0659    P.O. 0     Total Intake(mL/kg) 0 (0)     Urine (mL/kg/hr) 1200 (0.6) 100 (0)    Stool 0     Total Output 1200 100    Net -1200 -100          Urine Occurrence 2 x 2 x    Stool Occurrence 0 x 0 x            Intake/Output Summary (Last 24 hours) at 4/2/2025 0631  Last data filed at 4/1/2025 1441  Gross per 24 hour   Intake --   Output 100 ml   Net -100 ml           Lines/drains/airway:       Peripheral IV - Single Lumen 03/28/25 2159 20 G Right Antecubital (Active)   Site Assessment Clean;Dry;Intact 04/02/25 0400   Line Securement Device Secured with sutureless device 03/31/25 0800   Extremity Assessment Distal to IV No abnormal discoloration;No redness;No swelling;No warmth 04/01/25 2000   Line Status Infusing 04/02/25 0400   Dressing Status Clean;Dry;Intact 04/02/25 0400   Dressing Intervention Integrity maintained 04/02/25 0400   Number of days: 4       Physical examination:  Gen: NAD, AAOx3, answering questions appropriately  HEENT: PERRLA  CV: RR  Resp: NWOB  Abd: S//ND. Tenderness in epigastric/periumbilical region. Large tender hematoma under epigastric incision.   Ext: moving all extremities spontaneously and purposefully  Neuro: CN II-XII grossly intact    Labs:  Renal:  Recent Labs     03/31/25  0803   BUN 10.7   CREATININE 0.81     No results for input(s): "LACTIC" in the last 72 hours.  FENGI:  Recent Labs     03/31/25  0803      K 3.3*   *   CO2 23   CALCIUM 8.1*     Heme:  Recent Labs     03/31/25  0434 04/01/25  0005   HGB 12.1 12.0   HCT 36.5* 36.2*   *  --      ID:  Recent Labs     03/31/25 0434   WBC 6.37     CBG:  Recent Labs     03/31/25  0803   GLUCOSE 69*      Cardiovascular:  No results for input(s): "TROPONINI", "CKTOTAL", "CKMB", "BNP" in the last 168 hours.  ABG:  No results for input(s): "PH", "PO2", "PCO2", "HCO3", "BE" in the last 168 hours.   I have reviewed all pertinent lab results within the past " 24 hours.    Imaging:  X-Ray Abdomen AP 1 View   Final Result      Moderate amount of stool in the right colon.         Electronically signed by: Fidel Peter MD   Date:    03/30/2025   Time:    19:25      CT Abdomen Pelvis With IV Contrast NO Oral Contrast   Final Result      1. Multiple stable non calcified nodules in the image lung base with range measurement of 4to 8mm, the largest of which is at the posterior right lower lobe on image 2 series 2. This is of concern for metastasis. No focal infiltrate or consolidation is seen.   2. Numerous low attenuating foci in the spleen. This is of concern for metastasis.   3. Mesenteric swirl is seen adjacent to the 3rd segment of the duodenum. The superior mesenteric vein swirls around the superior mesenteric artery. The proximal jejunum is seen directed towards the right with associated mild wall thickening. 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 Correlate clinically as regards additional evaluation and follow-up.   4. Details and other findings as discussed above.   5. The findings are concordant with the [facility] report.         Electronically signed by: Casey Reardon   Date:    03/29/2025   Time:    08:27         I have reviewed all pertinent imaging results/findings within the past 24 hours.    Micro/Path/Other:  Microbiology Results (last 7 days)       ** No results found for the last 168 hours. **           Pathology Results  (Last 7 days)      None             Assessment & Plan:   Nell Mock is a84F w/ HTN, MI (2019), CAD s/p stent (EF 55-60% 04/2024), CKD 3, w/ significant PSH here w/ abdominal pain and CT showing internal hernia now s/p lap to open JORGE (3/29). PSH of appendectomy, colon resection for diverticulitis, hysterectomy, mesh and sling for bladder incontinence. Course complicated by  persistent symptomatic port site hematoma     - CLD  - daily labs   - prn antihypertensives  - MMPC  - DVT ppx - lovenox 30 mg qD  - PT/OT    Shyam Shannon MD  [employer] General Surgery, PGY-2  04/02/2025

## 2025-04-02 NOTE — PLAN OF CARE
04/02/25 1439   Discharge Assessment   Assessment Type Discharge Planning Assessment   Confirmed/corrected address, phone number and insurance Yes   Source of Information patient   When was your last doctors appointment? 02/09/23   Communicated WALT with patient/caregiver Date not available/Unable to determine   Reason For Admission volvulus   People in Home child(mario), adult   Do you expect to return to your current living situation? Yes   Do you have help at home or someone to help you manage your care at home? Yes   Who are your caregiver(s) and their phone number(s)? Kathryn Mock [phone]   Prior to hospitilization cognitive status: Alert/Oriented   Current cognitive status: Alert/Oriented   Walking or Climbing Stairs Difficulty no   Dressing/Bathing Difficulty no   Home Accessibility stairs to enter home   Number of Stairs, Main Entrance none   Stair Railings, Main Entrance none   Home Layout Able to live on 1st floor   Equipment Currently Used at Home none   Patient currently being followed by outpatient case management? No   Do you currently have service(s) that help you manage your care at home? No   Do you take prescription medications? Yes   Do you have prescription coverage? Yes   Do you have any problems affording any of your prescribed medications? No   Is the patient taking medications as prescribed? yes   Who is going to help you get home at discharge? Kathryn Mock   How do you get to doctors appointments? car, drives self   Are you on dialysis? No   Do you take coumadin? No   Discharge Plan A Other  (tbd)   Discharge Plan B Other  (tbd)   DME Needed Upon Discharge  other (see comments)  (tbd)   Discharge Plan discussed with: Patient   Transition of Care Barriers None   OTHER   Name(s) of People in Home Axel Emili     Assessment done with pt in bed. Pt lives with her adult son who works full time. Her daughter, Kathryn, assist her as needed. No DME. No HH. Pt uses [facility] at [facility]  [address].

## 2025-04-02 NOTE — PLAN OF CARE
04/02/25 0819   Medicare Message   Important Message from Medicare regarding Discharge Appeal Rights Given to patient/caregiver;Explained to patient/caregiver

## 2025-04-03 LAB
ALBUMIN SERPL-MCNC: 2.5 G/DL (ref 3.4–4.8)
ALBUMIN/GLOB SERPL: 1.1 RATIO (ref 1.1–2)
ALP SERPL-CCNC: 214 UNIT/L (ref 40–150)
ALT SERPL-CCNC: 26 UNIT/L (ref 0–55)
ANION GAP SERPL CALC-SCNC: 12 MEQ/L
AST SERPL-CCNC: 48 UNIT/L (ref 11–45)
BASOPHILS # BLD AUTO: 0.04 X10(3)/MCL
BASOPHILS NFR BLD AUTO: 0.8 %
BILIRUB SERPL-MCNC: 0.4 MG/DL
BUN SERPL-MCNC: 6.7 MG/DL (ref 9.8–20.1)
CALCIUM SERPL-MCNC: 7.8 MG/DL (ref 8.4–10.2)
CHLORIDE SERPL-SCNC: 107 MMOL/L (ref 98–107)
CO2 SERPL-SCNC: 22 MMOL/L (ref 23–31)
CREAT SERPL-MCNC: 0.72 MG/DL (ref 0.55–1.02)
CREAT/UREA NIT SERPL: 9
EOSINOPHIL # BLD AUTO: 0.15 X10(3)/MCL (ref 0–0.9)
EOSINOPHIL NFR BLD AUTO: 3.1 %
ERYTHROCYTE [DISTWIDTH] IN BLOOD BY AUTOMATED COUNT: 14 % (ref 11.5–17)
GFR SERPLBLD CREATININE-BSD FMLA CKD-EPI: >60 ML/MIN/1.73/M2
GLOBULIN SER-MCNC: 2.2 GM/DL (ref 2.4–3.5)
GLUCOSE SERPL-MCNC: 87 MG/DL (ref 82–115)
HCT VFR BLD AUTO: 30.5 % (ref 37–47)
HGB BLD-MCNC: 10.1 G/DL (ref 12–16)
IMM GRANULOCYTES # BLD AUTO: 0.01 X10(3)/MCL (ref 0–0.04)
IMM GRANULOCYTES NFR BLD AUTO: 0.2 %
LYMPHOCYTES # BLD AUTO: 1.36 X10(3)/MCL (ref 0.6–4.6)
LYMPHOCYTES NFR BLD AUTO: 28.3 %
MCH RBC QN AUTO: 29.6 PG (ref 27–31)
MCHC RBC AUTO-ENTMCNC: 33.1 G/DL (ref 33–36)
MCV RBC AUTO: 89.4 FL (ref 80–94)
MONOCYTES # BLD AUTO: 0.28 X10(3)/MCL (ref 0.1–1.3)
MONOCYTES NFR BLD AUTO: 5.8 %
NEUTROPHILS # BLD AUTO: 2.96 X10(3)/MCL (ref 2.1–9.2)
NEUTROPHILS NFR BLD AUTO: 61.8 %
NRBC BLD AUTO-RTO: 0 %
PLATELET # BLD AUTO: 138 X10(3)/MCL (ref 130–400)
PMV BLD AUTO: 11.1 FL (ref 7.4–10.4)
POTASSIUM SERPL-SCNC: 3 MMOL/L (ref 3.5–5.1)
PROT SERPL-MCNC: 4.7 GM/DL (ref 5.8–7.6)
RBC # BLD AUTO: 3.41 X10(6)/MCL (ref 4.2–5.4)
SODIUM SERPL-SCNC: 141 MMOL/L (ref 136–145)
WBC # BLD AUTO: 4.8 X10(3)/MCL (ref 4.5–11.5)

## 2025-04-03 PROCEDURE — 25000003 PHARM REV CODE 250: Performed by: STUDENT IN AN ORGANIZED HEALTH CARE EDUCATION/TRAINING PROGRAM

## 2025-04-03 PROCEDURE — 11000001 HC ACUTE MED/SURG PRIVATE ROOM

## 2025-04-03 PROCEDURE — 85025 COMPLETE CBC W/AUTO DIFF WBC: CPT | Performed by: STUDENT IN AN ORGANIZED HEALTH CARE EDUCATION/TRAINING PROGRAM

## 2025-04-03 PROCEDURE — 36415 COLL VENOUS BLD VENIPUNCTURE: CPT | Performed by: STUDENT IN AN ORGANIZED HEALTH CARE EDUCATION/TRAINING PROGRAM

## 2025-04-03 PROCEDURE — 63600175 PHARM REV CODE 636 W HCPCS: Performed by: STUDENT IN AN ORGANIZED HEALTH CARE EDUCATION/TRAINING PROGRAM

## 2025-04-03 PROCEDURE — 63600175 PHARM REV CODE 636 W HCPCS

## 2025-04-03 PROCEDURE — 99233 SBSQ HOSP IP/OBS HIGH 50: CPT | Mod: GC,,, | Performed by: STUDENT IN AN ORGANIZED HEALTH CARE EDUCATION/TRAINING PROGRAM

## 2025-04-03 PROCEDURE — 80053 COMPREHEN METABOLIC PANEL: CPT | Performed by: STUDENT IN AN ORGANIZED HEALTH CARE EDUCATION/TRAINING PROGRAM

## 2025-04-03 RX ADMIN — CARVEDILOL 12.5 MG: 12.5 TABLET, FILM COATED ORAL at 08:04

## 2025-04-03 RX ADMIN — MICONAZOLE NITRATE: 20 POWDER TOPICAL at 08:04

## 2025-04-03 RX ADMIN — ENOXAPARIN SODIUM 30 MG: 30 INJECTION SUBCUTANEOUS at 05:04

## 2025-04-03 RX ADMIN — SODIUM CHLORIDE, POTASSIUM CHLORIDE, SODIUM LACTATE AND CALCIUM CHLORIDE: 600; 310; 30; 20 INJECTION, SOLUTION INTRAVENOUS at 05:04

## 2025-04-03 RX ADMIN — MUPIROCIN: 20 OINTMENT TOPICAL at 08:04

## 2025-04-03 RX ADMIN — NIFEDIPINE 60 MG: 60 TABLET, FILM COATED, EXTENDED RELEASE ORAL at 08:04

## 2025-04-03 NOTE — PLAN OF CARE
Problem: Adult Inpatient Plan of Care  Goal: Plan of Care Review  Outcome: Progressing  Goal: Patient-Specific Goal (Individualized)  Outcome: Progressing  Goal: Absence of Hospital-Acquired Illness or Injury  Outcome: Progressing  Goal: Optimal Comfort and Wellbeing  Outcome: Progressing  Goal: Readiness for Transition of Care  Outcome: Progressing     Problem: Wound  Goal: Optimal Coping  Outcome: Progressing  Goal: Optimal Functional Ability  Outcome: Progressing  Goal: Absence of Infection Signs and Symptoms  Outcome: Progressing  Goal: Improved Oral Intake  Outcome: Progressing  Goal: Optimal Pain Control and Function  Outcome: Progressing  Goal: Skin Health and Integrity  Outcome: Progressing  Goal: Optimal Wound Healing  Outcome: Progressing     Problem: Infection  Goal: Absence of Infection Signs and Symptoms  Outcome: Progressing     Problem: Fall Injury Risk  Goal: Absence of Fall and Fall-Related Injury  Outcome: Progressing      - - -

## 2025-04-03 NOTE — PROGRESS NOTES
Inpatient Nutrition Assessment    Admit Date: 3/28/2025   Total duration of encounter: 6 days   Patient Age: 84 y.o.    Nutrition Recommendation/Prescription     Continue Regular Diet as tolerated.   Monitor wt, labs, and intake. Replete K as medically feasible.    Communication of Recommendations: reviewed with patient    Nutrition Assessment     Malnutrition Assessment/Nutrition-Focused Physical Exam       Malnutrition Level: other (see comments) (Unable to assess) (04/01/25 1009)  Energy Intake (Malnutrition): less than or equal to 50% for greater than or equal to 5 days (04/01/25 1009)  Weight Loss (Malnutrition): other (see comments) (Does not meet criteria) (04/01/25 1009)                 [place] (Muscle Loss): other (see comments) (unable to assess)                                A minimum of two characteristics is recommended for diagnosis of either severe or non-severe malnutrition.    Chart Review    Reason Seen: continuous nutrition monitoring and follow-up    Malnutrition Screening Tool Results   Have you recently lost weight without trying?: No  Have you been eating poorly because of a decreased appetite?: No   MST Score: 0   Diagnosis:  Volvulus s/p lap to open JORGE (3/29)     Relevant Medical History:   Arthritis   CKD (chronic kidney disease) stage 3, GFR 30-59 ml/min   Digestive disorder   Essential (primary) hypertension   Leukemia   MI (myocardial infarction)       Scheduled Medications:  carvediloL, 12.5 mg, BID  enoxparin, 30 mg, Q24H (prophylaxis, 1700)  miconazole NITRATE 2 %, , BID  NIFEdipine, 60 mg, Daily  silver nitrate applicators, 3 applicator, Once    Continuous Infusions:     PRN Medications:  artificial tears, 1 drop, PRN  diphenhydrAMINE, 25 mg, Q6H PRN  hydrALAZINE, 10 mg, Q6H PRN  HYDROmorphone, 0.5 mg, Q6H PRN  LIDOcaine HCL 10 mg/ml (1%), 1 mL, Once PRN  methocarbamoL, 500 mg, TID PRN  ondansetron, 4 mg, Q6H PRN  ondansetron, 4 mg, Q6H PRN  oxyCODONE, 10 mg, Q6H  "PRN  oxyCODONE, 10 mg, Q6H PRN  sodium chloride 0.9%, 10 mL, PRN    Calorie Containing IV Medications: no significant kcals from medications at this time    Recent Labs   Lab 03/28/25  2154 03/29/25  0532 03/31/25  0434 03/31/25  0803 04/01/25  0005 04/02/25  0701 04/03/25  0801    141  --  142  --   --  141   K 4.1 3.9  --  3.3*  --   --  3.0*   CALCIUM 9.8 8.6  --  8.1*  --   --  7.8*    106  --  109*  --   --  107   CO2 25 24  --  23  --   --  22*   BUN 13.8 13.3  --  10.7  --   --  6.7*   CREATININE 1.23* 1.07*  --  0.81  --   --  0.72   EGFRNORACEVR 44 52  --  >60  --   --  >60   GLUCOSE 97 80*  --  69*  --   --  87   BILITOT 0.5  --   --   --   --   --  0.4   ALKPHOS 158*  --   --   --   --   --  214*   ALT 15  --   --   --   --   --  26   AST 36  --   --   --   --   --  48*   ALBUMIN 3.8  --   --   --   --   --  2.5*   LIPASE 82*  --   --   --   --   --   --    WBC 9.93 6.89 6.37  --   --  4.81 4.80   HGB 15.1 13.5 12.1  --  12.0 10.5* 10.1*   HCT 45.8 42.3 36.5*  --  36.2* 31.7* 30.5*     Nutrition Orders:  Diet Adult Regular      Appetite/Oral Intake: fair/50-75% of meals  Factors Affecting Nutritional Intake: none identified  Social Needs Impacting Access to Food: none identified  Food/Zoroastrianism/Cultural Preferences: none reported  Food Allergies: shellfish and latex  Last Bowel Movement: 03/28/25  Wound(s):     Wound 04/02/25 0800 Moisture associated dermatitis Right lateral Upper quadrant-Tissue loss description: Not applicable surgical incision     Comments    4/1/25: Pt NPO; has been NPO/Clear liquids since admit; unable to speak with pt;  NFPE on f/u.     4/3/25: Pt now on regular diet and tolerating well; reports fair intake of meals; states that her usual wt is 195 lbs.     Anthropometrics    Height: 5' 1" (154.9 cm), Height Method: Stated  Last Weight: 88.5 kg (195 lb 1.7 oz) (03/29/25 1606), Weight Method: Standard Scale  BMI (Calculated): 36.9  BMI Classification: obese grade II (BMI " 35-39.9)     Ideal Body Weight (IBW), Female: 105 lb     % Ideal Body Weight, Female (lb): 185.82 %                             Usual Weight Provided By: EMR weight history    Wt Readings from Last 5 Encounters:   03/29/25 88.5 kg (195 lb 1.7 oz)   10/26/24 89.4 kg (197 lb)   08/13/24 90.7 kg (200 lb)   08/13/24 92.1 kg (203 lb)   07/25/24 92.1 kg (203 lb)     Weight Change(s) Since Admission:   Wt Readings from Last 1 Encounters:   03/29/25 1606 88.5 kg (195 lb 1.7 oz)   03/28/25 2144 88.5 kg (195 lb)   Admit Weight: 88.5 kg (195 lb) (03/28/25 2144), Weight Method: Stated    Estimated Needs    Weight Used For Calorie Calculations: 88.5 kg (195 lb 1.7 oz)  Energy Calorie Requirements (kcal): 1667 kcal (MSJ x 1.3 SF)  Energy Need Method: [place]-St Jeor  Weight Used For Protein Calculations: 88.5 kg (195 lb 1.7 oz)  Protein Requirements: 106 gm (1.2g/kg)  Fluid Requirements (mL): 2655 mL (30mL/kg)        Enteral Nutrition     Patient not receiving enteral nutrition at this time.    Parenteral Nutrition     Patient not receiving parenteral nutrition support at this time.    Evaluation of Received Nutrient Intake    Calories: meeting estimated needs  Protein: meeting estimated needs    Patient Education     Not applicable.    Nutrition Diagnosis     PES: Inadequate oral intake related to acute illness as evidenced by NPO/Clear Liquid diet since admit. (resolved)     PES:            Nutrition Interventions     Intervention(s): modified composition of parenteral nutrition, modified rate of parenteral nutrition, and collaboration with other providers  Intervention(s): Care coordination or referral;Parenteral nutrition or supplemental parenteral nutrition    Goal: Meet greater than 80% of nutritional needs by follow-up. (goal progressing)  Goal: Maintain weight throughout hospitalization. (goal progressing)    Nutrition Goals & Monitoring     Dietitian will monitor: energy intake and weight  Discharge planning: continue  Regular diet  Nutrition Risk/Follow-Up: low (follow-up in 5-7 days)   Please consult if re-assessment needed sooner.

## 2025-04-03 NOTE — DISCHARGE INSTRUCTIONS
Wound care instructions: (Exploratory Lap)  Leave midline abdominal incision staples open to air  Okay to shower over staples  No soaking in bath tub for 6-8 weeks  No driving for 6-8 weeks and while on narcotics  No heavy lifting greater than 10 lb for 6 weeks

## 2025-04-03 NOTE — PROGRESS NOTES
"   Acute Care Surgery   Progress Note  Admit Date: 3/28/2025  HD#5  POD#5 Days Post-Op    Subjective:   Interval history:  Af, vss  Tolerating CLD better today  +ambulatory  +BM  Has abdominal binder, says it helps with pain    Home Meds:  Current Outpatient Medications   Medication Instructions    aspirin 81 mg, Oral, Daily    carvediloL (COREG) 12.5 mg, Oral, 2 times daily    co-enzyme Q-10 30 mg, Daily    cranberry 500 mg Cap 1 capsule, Daily    furosemide (LASIX) 20 mg, Daily PRN    NIFEdipine (ADALAT CC) 60 mg, Daily    omega-3 fatty acids/fish oil (FISH OIL-OMEGA-3 FATTY ACIDS) 300-1,000 mg capsule Daily    pantoprazole (PROTONIX) 40 mg, Oral, Daily    vitamin D (VITAMIN D3) 1,000 Units, Daily      Scheduled Meds:   carvediloL  12.5 mg Oral BID    enoxparin  30 mg Subcutaneous Q24H (prophylaxis, 1700)    miconazole NITRATE 2 %   Topical (Top) BID    mupirocin   Nasal BID    NIFEdipine  60 mg Oral Daily    silver nitrate applicators  3 applicator Topical (Top) Once     Continuous Infusions:   lactated ringers   Intravenous Continuous 125 mL/hr at 04/03/25 0559 New Bag at 04/03/25 0559     PRN Meds:  Current Facility-Administered Medications:     artificial tears, 1 drop, Both Eyes, PRN    diphenhydrAMINE, 25 mg, Intravenous, Q6H PRN    hydrALAZINE, 10 mg, Intravenous, Q6H PRN    HYDROmorphone, 0.5 mg, Intravenous, Q6H PRN    LIDOcaine HCL 10 mg/ml (1%), 1 mL, Intradermal, Once PRN    methocarbamoL, 500 mg, Oral, TID PRN    ondansetron, 4 mg, Oral, Q6H PRN    ondansetron, 4 mg, Intravenous, Q6H PRN    oxyCODONE, 10 mg, Oral, Q6H PRN    oxyCODONE, 10 mg, Oral, Q6H PRN    sodium chloride 0.9%, 10 mL, Intravenous, PRN     Objective:     VITAL SIGNS: 24 HR MIN & MAX LAST   Temp  Min: 97.8 °F (36.6 °C)  Max: 98.3 °F (36.8 °C)  98.3 °F (36.8 °C)   BP  Min: 117/69  Max: 154/63  (!) 146/69    Pulse  Min: 59  Max: 62  62    Resp  Min: 17  Max: 18  17    SpO2  Min: 95 %  Max: 97 %  96 %      HT: 5' 1" (154.9 cm)  WT: 88.5 " "kg (195 lb 1.7 oz)  BMI: 36.9     Intake/output:  Intake/Output - Last 3 Shifts         04/01 0700 04/02 0659 04/02 0700 04/03 0659 04/03 0700 04/04 0659    P.O.  430     Total Intake(mL/kg)  430 (4.9)     Urine (mL/kg/hr) 100 (0) 500 (0.2)     Stool  0     Total Output 100 500     Net -100 -70            Urine Occurrence 2 x 5 x     Stool Occurrence 0 x 0 x             Intake/Output Summary (Last 24 hours) at 4/3/2025 0722  Last data filed at 4/2/2025 1511  Gross per 24 hour   Intake 430 ml   Output 500 ml   Net -70 ml           Lines/drains/airway:       Peripheral IV - Single Lumen 03/28/25 2159 20 G Right Antecubital (Active)   Site Assessment Clean;Dry;Intact;No redness;No swelling 04/02/25 2000   Line Securement Device Secured with sutureless device 04/02/25 2000   Extremity Assessment Distal to IV No abnormal discoloration;No redness;No swelling;No warmth 04/02/25 2000   Line Status Capped;Saline locked 04/02/25 2000   Dressing Status Clean;Dry;Intact 04/02/25 2000   Dressing Intervention Integrity maintained 04/02/25 2000   Number of days: 5            Peripheral IV - Single Lumen 04/02/25 0800 20 G Anterior;Distal;Right Wrist (Active)   Site Assessment Clean;Dry;Intact;No redness;No swelling 04/02/25 2000   Extremity Assessment Distal to IV No abnormal discoloration;No redness;No swelling;No warmth 04/02/25 2000   Line Status Infusing 04/02/25 2000   Dressing Status Clean;Dry;Intact 04/02/25 2000   Dressing Intervention Integrity maintained 04/02/25 2000   Number of days: 0       Physical examination:  Gen: NAD, AAOx3, answering questions appropriately  HEENT: JOSEPH  CV: RR  Resp: NWOB  Abd: S//ND. Tenderness in epigastric/periumbilical region. Large tender hematoma under epigastric incision.   Ext: moving all extremities spontaneously and purposefully  Neuro: CN II-XII grossly intact    Labs:  Renal:  Recent Labs     03/31/25  0803   BUN 10.7   CREATININE 0.81     No results for input(s): "LACTIC" in " "the last 72 hours.  FENGI:  Recent Labs     03/31/25  0803      K 3.3*   *   CO2 23   CALCIUM 8.1*     Heme:  Recent Labs     04/01/25  0005 04/02/25  0701   HGB 12.0 10.5*   HCT 36.2* 31.7*   PLT  --  142     ID:  Recent Labs     04/02/25  0701   WBC 4.81     CBG:  Recent Labs     03/31/25  0803   GLUCOSE 69*      Cardiovascular:  No results for input(s): "TROPONINI", "CKTOTAL", "CKMB", "BNP" in the last 168 hours.  ABG:  No results for input(s): "PH", "PO2", "PCO2", "HCO3", "BE" in the last 168 hours.   I have reviewed all pertinent lab results within the past 24 hours.    Imaging:  X-Ray Abdomen AP 1 View   Final Result      No acute abdominal radiographic abnormality.         Electronically signed by: Alley Peña   Date:    04/02/2025   Time:    13:42      X-Ray Abdomen AP 1 View   Final Result      Moderate amount of stool in the right colon.         Electronically signed by: Fidel Peter MD   Date:    03/30/2025   Time:    19:25      CT Abdomen Pelvis With IV Contrast NO Oral Contrast   Final Result      1. Multiple stable non calcified nodules in the image lung base with range measurement of 4to 8mm, the largest of which is at the posterior right lower lobe on image 2 series 2. This is of concern for metastasis. No focal infiltrate or consolidation is seen.   2. Numerous low attenuating foci in the spleen. This is of concern for metastasis.   3. Mesenteric swirl is seen adjacent to the 3rd segment of the duodenum. The superior mesenteric vein swirls around the superior mesenteric artery. The proximal jejunum is seen directed towards the right with associated mild wall thickening. 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 Correlate clinically as regards additional evaluation and follow-up.   4. Details and other findings as discussed above.   5. The " findings are concordant with the [facility] report.         Electronically signed by: Casey Reardon   Date:    03/29/2025   Time:    08:27      Radiology   Final Result         I have reviewed all pertinent imaging results/findings within the past 24 hours.    Micro/Path/Other:  Microbiology Results (last 7 days)       ** No results found for the last 168 hours. **           Pathology Results  (Last 7 days)      None             Assessment & Plan:   Nell Mock is a Nell Mock is a84F w/ HTN, MI (2019), CAD s/p stent (EF 55-60% 04/2024), CKD 3, w/ significant PSH here w/ abdominal pain and CT showing internal hernia now s/p lap to open JORGE (3/29). PSH of appendectomy, colon resection for diverticulitis, hysterectomy, mesh and sling for bladder incontinence. Course complicated by persistent symptomatic port site hematoma     - regular diet   - daily labs   - prn antihypertensives  - MMPC  - DVT ppx - lovenox 30 mg qD  - PT/OT    Shyam Shannon MD  [employer] General Surgery, PGY-2  04/03/2025

## 2025-04-04 ENCOUNTER — TELEPHONE (OUTPATIENT)
Facility: CLINIC | Age: 85
End: 2025-04-04
Payer: MEDICARE

## 2025-04-04 VITALS
BODY MASS INDEX: 36.84 KG/M2 | SYSTOLIC BLOOD PRESSURE: 155 MMHG | HEART RATE: 68 BPM | RESPIRATION RATE: 20 BRPM | TEMPERATURE: 98 F | HEIGHT: 61 IN | WEIGHT: 195.13 LBS | DIASTOLIC BLOOD PRESSURE: 70 MMHG | OXYGEN SATURATION: 96 %

## 2025-04-04 PROBLEM — Z98.890 S/P EXPLORATORY LAPAROTOMY: Status: ACTIVE | Noted: 2025-04-04

## 2025-04-04 LAB
ANION GAP SERPL CALC-SCNC: 9 MEQ/L
BUN SERPL-MCNC: 7.4 MG/DL (ref 9.8–20.1)
CALCIUM SERPL-MCNC: 8.3 MG/DL (ref 8.4–10.2)
CHLORIDE SERPL-SCNC: 108 MMOL/L (ref 98–107)
CO2 SERPL-SCNC: 26 MMOL/L (ref 23–31)
CREAT SERPL-MCNC: 0.79 MG/DL (ref 0.55–1.02)
CREAT/UREA NIT SERPL: 9
GFR SERPLBLD CREATININE-BSD FMLA CKD-EPI: >60 ML/MIN/1.73/M2
GLUCOSE SERPL-MCNC: 88 MG/DL (ref 82–115)
POTASSIUM SERPL-SCNC: 3.1 MMOL/L (ref 3.5–5.1)
SODIUM SERPL-SCNC: 143 MMOL/L (ref 136–145)

## 2025-04-04 PROCEDURE — 25000003 PHARM REV CODE 250: Performed by: STUDENT IN AN ORGANIZED HEALTH CARE EDUCATION/TRAINING PROGRAM

## 2025-04-04 PROCEDURE — 25000003 PHARM REV CODE 250

## 2025-04-04 PROCEDURE — 99238 HOSP IP/OBS DSCHRG MGMT 30/<: CPT | Mod: GC,,, | Performed by: STUDENT IN AN ORGANIZED HEALTH CARE EDUCATION/TRAINING PROGRAM

## 2025-04-04 PROCEDURE — 36415 COLL VENOUS BLD VENIPUNCTURE: CPT

## 2025-04-04 PROCEDURE — 80048 BASIC METABOLIC PNL TOTAL CA: CPT

## 2025-04-04 RX ORDER — OXYCODONE HYDROCHLORIDE 10 MG/1
10 TABLET ORAL EVERY 6 HOURS PRN
Qty: 15 TABLET | Refills: 0 | Status: SHIPPED | OUTPATIENT
Start: 2025-04-04 | End: 2025-04-09

## 2025-04-04 RX ADMIN — MICONAZOLE NITRATE: 20 POWDER TOPICAL at 09:04

## 2025-04-04 RX ADMIN — OXYCODONE HYDROCHLORIDE 10 MG: 10 TABLET ORAL at 06:04

## 2025-04-04 RX ADMIN — POTASSIUM BICARBONATE 25 MEQ: 977.5 TABLET, EFFERVESCENT ORAL at 09:04

## 2025-04-04 RX ADMIN — NIFEDIPINE 60 MG: 60 TABLET, FILM COATED, EXTENDED RELEASE ORAL at 09:04

## 2025-04-04 RX ADMIN — CARVEDILOL 12.5 MG: 12.5 TABLET, FILM COATED ORAL at 09:04

## 2025-04-04 NOTE — PLAN OF CARE
04/04/25 1206   Final Note   Assessment Type Final Discharge Note   Anticipated Discharge Disposition Home   What phone number can be called within the next 1-3 days to see how you are doing after discharge? [phone]   Hospital Resources/Appts/Education Provided Appointments scheduled and added to AVS   Post-Acute Status   Discharge Delays None known at this time     Pt being discharged home today. PCP appt made and added to AVS. No further dc needs at this time.

## 2025-04-04 NOTE — HOSPITAL COURSE
Nell Mock is an 84 year old female who was admitted for an internal hernia s/p lap converted to open lysis of adhesions 3/29 with Dr Maradiaga. Tolerating diet. Bowel function returned. Ambulating. Pain controlled. Was given potassium during hospital course. Patient verbalized understanding of all discharge instructions, new prescription usage, follow up appointments, signs and symptoms to be aware of for immediate evaluation. Follow up: PCP (Dr Colon), [facility], Immunologist (Dr Vargas), and Cardiologist (Dr Hatfield).

## 2025-04-04 NOTE — PLAN OF CARE
Problem: Wound  Goal: Optimal Coping  Outcome: Met  Goal: Optimal Functional Ability  Outcome: Met  Goal: Absence of Infection Signs and Symptoms  Outcome: Met  Goal: Improved Oral Intake  Outcome: Met  Goal: Optimal Pain Control and Function  Outcome: Met  Goal: Skin Health and Integrity  Outcome: Met  Goal: Optimal Wound Healing  Outcome: Met     Problem: Fall Injury Risk  Goal: Absence of Fall and Fall-Related Injury  Outcome: Met

## 2025-04-04 NOTE — DISCHARGE SUMMARY
[facility] - 8th Floor Med Surg  General Surgery  Discharge Summary      Patient Name: Nell Mock  MRN: [id number]  Admission Date: 3/28/2025  Hospital Length of Stay: 6 days  Discharge Date and Time: 04/04/2025 10:06 AM  Attending Physician: Reji Marinao MD   Discharging Provider: Mago Munson PA-C  Primary Care Provider: No primary care provider on file.    HPI:   No notes on file    Procedure(s) (LRB):  LAPAROSCOPY, DIAGNOSTIC (N/A)      Indwelling Lines/Drains at time of discharge:   Lines/Drains/Airways       None                 Hospital Course: Nell Mock is an 84 year old female who was admitted for an internal hernia s/p lap converted to open lysis of adhesions 3/29 with Dr Maradiaga. Tolerating diet. Bowel function returned. Ambulating. Pain controlled. Was given potassium during hospital course. Patient verbalized understanding of all discharge instructions, new prescription usage, follow up appointments, signs and symptoms to be aware of for immediate evaluation. Follow up: PCP (Dr Colon), [facility] clinic, Immunologist (Dr Vargas), and Cardiologist (Dr Hatfield).    Goals of Care Treatment Preferences:  Code Status: Full Code      Consults:   Consults (From admission, onward)          Status Ordering Provider     Inpatient consult to Social Work/Case Management  Once        Provider:  (Not yet assigned)    MAGO French            Significant Diagnostic Studies: N/A    Pending Diagnostic Studies:       None          Final Active Diagnoses:    Diagnosis Date Noted POA    PRINCIPAL PROBLEM:  S/P laparotomy with lysis of adhesions [Z98.890] 04/04/2025 Not Applicable      Problems Resolved During this Admission:      Discharged Condition: good    Disposition: Home or Self Care    Follow Up:   Follow-up Information       Clinic, Acute Care Surgery Follow up.    Contact information:  [address]  [address]  [facility] [address]  [phone]               Aj Colon MD  Follow up in 1 week(s).    Specialty: Family Medicine  Why: 04/09/25 @1020  Contact information:  [address]  [place] [address]  [phone]               Katherine Vargas MD .    Specialties: Allergy and Immunology, Allergy, Immunology  Contact information:  [address]  [facility] [address]  [facility]  [facility] [address]  [phone]               Wil Hatfield MD Follow up.    Specialty: Cardiology  Contact information:  [address]  [facility] [address]  [phone]                           Patient Instructions:      No driving until:   Order Comments: UNTIL [facility] CLINIC APPOINTMENT     Notify your health care provider if you experience any of the following:  temperature >100.4     Notify your health care provider if you experience any of the following:  persistent nausea and vomiting or diarrhea     Notify your health care provider if you experience any of the following:  severe uncontrolled pain     Notify your health care provider if you experience any of the following:  redness, tenderness, or signs of infection (pain, swelling, redness, odor or green/yellow discharge around incision site)     No dressing needed     Activity as tolerated     Medications:  Reconciled Home Medications:      Medication List        START taking these medications      oxyCODONE 10 mg Tab immediate release tablet  Commonly known as: ROXICODONE  Take 1 tablet (10 mg total) by mouth every 6 (six) hours as needed for Pain.            CHANGE how you take these medications      aspirin 81 MG Chew  Take 1 tablet (81 mg total) by mouth once daily.  What changed: when to take this            CONTINUE taking these medications      carvediloL 12.5 MG tablet  Commonly known as: COREG  Take 1 tablet (12.5 mg total) by mouth 2 (two) times daily.     co-enzyme Q-10 30 mg capsule  Take 30 mg by mouth once daily.     cranberry 500 mg Cap  Take 1 capsule by mouth Daily.     fish oil-omega-3  fatty acids 300-1,000 mg capsule  Take by mouth once daily.     furosemide 20 MG tablet  Commonly known as: LASIX  Take 20 mg by mouth daily as needed.     NIFEdipine 60 MG Tbsr  Commonly known as: ADALAT CC  Take 60 mg by mouth once daily.     pantoprazole 40 MG tablet  Commonly known as: PROTONIX  Take 1 tablet (40 mg total) by mouth once daily.     vitamin D 1000 units Tab  Commonly known as: VITAMIN D3  Take 1,000 Units by mouth once daily.            Cynthia Munson PA-C  General Surgery  [facility] - 8th Floor Med Surg

## 2025-04-04 NOTE — PLAN OF CARE
Problem: Fall Injury Risk  Goal: Absence of Fall and Fall-Related Injury  Outcome: Progressing     Problem: Wound  Goal: Optimal Coping  Outcome: Progressing  Goal: Optimal Functional Ability  Outcome: Progressing  Goal: Absence of Infection Signs and Symptoms  Outcome: Progressing  Goal: Improved Oral Intake  Outcome: Progressing  Goal: Optimal Pain Control and Function  Outcome: Progressing  Goal: Skin Health and Integrity  Outcome: Progressing  Goal: Optimal Wound Healing  Outcome: Progressing

## 2025-04-07 ENCOUNTER — PATIENT OUTREACH (OUTPATIENT)
Dept: ADMINISTRATIVE | Facility: CLINIC | Age: 85
End: 2025-04-07
Payer: MEDICARE

## 2025-04-07 ENCOUNTER — PATIENT MESSAGE (OUTPATIENT)
Dept: ADMINISTRATIVE | Facility: CLINIC | Age: 85
End: 2025-04-07
Payer: MEDICARE

## 2025-04-07 NOTE — PROGRESS NOTES
C3 nurse attempted to contact Nell Mock for a TCC post hospital discharge follow up call. No answer. Voicemail left with instructions to return call. Spoke to patient's daughter who stated she will inform patient to return call. The patient has a scheduled HOSFU appointment with Aj Colon MD on 4/9 @ 1020.

## 2025-04-08 NOTE — PROGRESS NOTES
C3 nurse spoke with Nell Mock for a TCC post hospital discharge follow up call. The patient has a scheduled HOSFU appointment with Aj Colon MD on 4/9 @ 1020.

## 2025-04-15 ENCOUNTER — OFFICE VISIT (OUTPATIENT)
Dept: SURGERY | Facility: CLINIC | Age: 85
End: 2025-04-15
Payer: MEDICARE

## 2025-04-15 VITALS
OXYGEN SATURATION: 95 % | SYSTOLIC BLOOD PRESSURE: 142 MMHG | HEIGHT: 61 IN | WEIGHT: 195.13 LBS | HEART RATE: 69 BPM | DIASTOLIC BLOOD PRESSURE: 81 MMHG | TEMPERATURE: 98 F | BODY MASS INDEX: 36.84 KG/M2

## 2025-04-15 DIAGNOSIS — K45.8 INTERNAL HERNIA: ICD-10-CM

## 2025-04-15 DIAGNOSIS — Z98.890 S/P LAPAROTOMY WITH LYSIS OF ADHESIONS: Primary | ICD-10-CM

## 2025-04-15 PROCEDURE — 99024 POSTOP FOLLOW-UP VISIT: CPT | Mod: POP,,,

## 2025-04-15 NOTE — PROGRESS NOTES
Acute Care Surgery Clinic Note  Date of surgery/procedure:  3/29/25  Operation/Procedure:  Lap converted to open, lysis of adhesions with internal hernia and small-bowel volvulus  Surgeon: Dr. Casey Maradiaga    Subjective:   Nell Mock is a 84 y.o. female who presents to the clinic for postop evaluation and staple/suture removal.  Tolerating diet.  Pain control without narcotics.  Ambulating without difficulty.  Having bowel movements.  Reports dark stools, has a GI appointment in May.  Denies abdominal pain, nausea, vomiting, fevers or chills.      Past medical history:  Past Medical History:   Diagnosis Date    Arthritis     CKD (chronic kidney disease) stage 3, GFR 30-59 ml/min     Digestive disorder     Essential (primary) hypertension     Leukemia     in remission    MI (myocardial infarction)      Past Medical History:   Diagnosis Date    Arthritis     CKD (chronic kidney disease) stage 3, GFR 30-59 ml/min     Digestive disorder     Essential (primary) hypertension     Leukemia     in remission    MI (myocardial infarction)      Past surgical history:  Past Surgical History:   Procedure Laterality Date    APPENDECTOMY      BLADDER SURGERY      BONE MARROW BIOPSY W/ ASPIRATION      CATARACT EXTRACTION W/  INTRAOCULAR LENS IMPLANT Bilateral     COLONOSCOPY N/A 09/13/2023    Procedure: COLON;  Surgeon: Camilo Gordillo MD;  Location: [facility] ENDOSCOPY;  Service: Gastroenterology;  Laterality: N/A;  LATEX ALLERGY    CORONARY ANGIOPLASTY WITH STENT PLACEMENT      DIAGNOSTIC LAPAROSCOPY N/A 3/29/2025    Procedure: LAPAROSCOPY, DIAGNOSTIC;  Surgeon: Casey Maradiaga MD;  Location: [facility];  Service: General;  Laterality: N/A;    HYSTERECTOMY      INJECTION OF JOINT Right 6/12/2024    Procedure: Injection, Joint, Hip right hip injection with anesthesia;  Surgeon: Ismael Mackey MD;  Location: [facility] OR;  Service: Orthopedics;  Laterality: Right;  right hip injection with anesthesia    NECK SURGERY       SINUS SURGERY      x3    TONSILLECTOMY       Family history:  Family History   Family history unknown: Yes     Social history:  Social History     Socioeconomic History    Marital status:    Tobacco Use    Smoking status: Never    Smokeless tobacco: Never   Substance and Sexual Activity    Alcohol use: Yes     Comment: wine yearly    Drug use: Never    Sexual activity: Not Currently     Partners: Male     Social Drivers of Health     Financial Resource Strain: Patient Declined (3/29/2025)    Overall Financial Resource Strain (CARDIA)     Difficulty of Paying Living Expenses: Patient declined   Food Insecurity: Patient Declined (3/29/2025)    Hunger Vital Sign     Worried About Running Out of Food in the Last Year: Patient declined     Ran Out of Food in the Last Year: Patient declined   Transportation Needs: Patient Declined (3/29/2025)    PRAPARE - Transportation     Lack of Transportation (Medical): Patient declined     Lack of Transportation (Non-Medical): Patient declined   Stress: Patient Declined (3/29/2025)    [employer] [place] [employer] - Occupational Stress Questionnaire     Feeling of Stress : Patient declined   Housing Stability: Patient Declined (3/29/2025)    Housing Stability Vital Sign     Unable to Pay for Housing in the Last Year: Patient declined     Homeless in the Last Year: Patient declined     Tobacco Use History[1]   Social History     Substance and Sexual Activity   Alcohol Use Yes    Comment: wine yearly      Allergies:   Review of patient's allergies indicates:   Allergen Reactions    Latex Swelling    Benazepril      cough  cough      Crestor [rosuvastatin]     Amoxicillin-pot clavulanate      Patient does not recall reaction type  Patient does not recall reaction type      Ciprofloxacin Rash     Other reaction(s): Bactrim  Urticaria,hives  Urticaria,hives  Urticaria,hives  Urticaria,hives      Shellfish containing products Nausea Only    Sulfamethoxazole-trimethoprim  Itching and Other (See Comments)     bleeding  Bleeding (C diff colitis)       Home Meds:   Current Outpatient Medications   Medication Instructions    aspirin 81 mg, Oral, Daily    carvediloL (COREG) 12.5 mg, Oral, 2 times daily    co-enzyme Q-10 30 mg, Daily    cranberry 500 mg Cap 1 capsule, Daily    furosemide (LASIX) 20 mg, Daily PRN    NIFEdipine (ADALAT CC) 60 mg, Daily    omega-3 fatty acids/fish oil (FISH OIL-OMEGA-3 FATTY ACIDS) 300-1,000 mg capsule Daily    pantoprazole (PROTONIX) 40 mg, Oral, Daily    vitamin D (VITAMIN D3) 1,000 Units, Daily    Medications Ordered Prior to Encounter[2]   Current Outpatient Medications on File Prior to Visit   Medication Sig    aspirin 81 MG Chew Take 1 tablet (81 mg total) by mouth once daily.    carvediloL (COREG) 12.5 MG tablet Take 1 tablet (12.5 mg total) by mouth 2 (two) times daily.    co-enzyme Q-10 30 mg capsule Take 30 mg by mouth once daily.    cranberry 500 mg Cap Take 1 capsule by mouth Daily.    furosemide (LASIX) 20 MG tablet Take 20 mg by mouth daily as needed.    NIFEdipine (ADALAT CC) 60 MG TbSR Take 60 mg by mouth once daily.    omega-3 fatty acids/fish oil (FISH OIL-OMEGA-3 FATTY ACIDS) 300-1,000 mg capsule Take by mouth once daily.    vitamin D (VITAMIN D3) 1000 units Tab Take 1,000 Units by mouth once daily.    pantoprazole (PROTONIX) 40 MG tablet Take 1 tablet (40 mg total) by mouth once daily.     No current facility-administered medications on file prior to visit.      Outpatient Medications as of 4/15/2025   Medication Sig Dispense Refill    aspirin 81 MG Chew Take 1 tablet (81 mg total) by mouth once daily. 90 tablet 3    carvediloL (COREG) 12.5 MG tablet Take 1 tablet (12.5 mg total) by mouth 2 (two) times daily. 60 tablet 3    co-enzyme Q-10 30 mg capsule Take 30 mg by mouth once daily.      cranberry 500 mg Cap Take 1 capsule by mouth Daily.      furosemide (LASIX) 20 MG tablet Take 20 mg by mouth daily as needed.      NIFEdipine (ADALAT CC)  "60 MG TbSR Take 60 mg by mouth once daily.      omega-3 fatty acids/fish oil (FISH OIL-OMEGA-3 FATTY ACIDS) 300-1,000 mg capsule Take by mouth once daily.      vitamin D (VITAMIN D3) 1000 units Tab Take 1,000 Units by mouth once daily.      pantoprazole (PROTONIX) 40 MG tablet Take 1 tablet (40 mg total) by mouth once daily. 30 tablet 11     No current facility-administered medications on file as of 4/15/2025.        ROS  A 12-point review of systems was performed with pertinent positives and negatives as per HPI     Objective:   Vitals:  Vitals:    04/15/25 0954   BP: (!) 142/81   Pulse: 69   Temp: 97.8 °F (36.6 °C)      Vitals:    04/15/25 0954   BP: (!) 142/81   Pulse: 69   Temp: 97.8 °F (36.6 °C)   SpO2: 95%   Weight: 88.5 kg (195 lb 1.7 oz)   Height: 5' 1" (1.549 m)       Physical Exam:  General: NAD  HEENT: Normocephalic  CV: RR  Pulm: NLB on RA   Abd: Soft, ND, NTTP, no rebound, no guarding  Extremities: no edema in bilateral lower extremities   Skin:  Incision Clean/Dry/Intact. No evidence of infection. Some erythema near staples. Suture to RUQ incision c/d/i    Pathology:  none    VISIT DIAGNOSES:    ICD-10-CM ICD-9-CM   1. S/P laparotomy with lysis of adhesions  Z98.890 V45.89   2. Internal hernia  K45.8 553.8       Plan:  - all staples and suture were removed in clinic  - no heavy lifting greater than 10 lb for additional 4 weeks  - Driving precautions  - follow up GI  - ED precautions given  - Follow up PCP   - Return PRN, no scheduled appointment needed. Call for concerns.    Future Appointments   Date Time Provider Department Center   4/15/2025 10:20 AM POST-OP EMERGENT, [facility] GENERAL SURGERY [facility] [place]          The above findings, diagnostics, and treatment plan were discussed with Dr. Reji Mariano who is in agreement with the plan of care except as stated in additional documentation.         [1]   Social History  Tobacco Use   Smoking Status Never   Smokeless Tobacco Never   [2]   Current " Outpatient Medications on File Prior to Visit   Medication Sig Dispense Refill    aspirin 81 MG Chew Take 1 tablet (81 mg total) by mouth once daily. 90 tablet 3    carvediloL (COREG) 12.5 MG tablet Take 1 tablet (12.5 mg total) by mouth 2 (two) times daily. 60 tablet 3    co-enzyme Q-10 30 mg capsule Take 30 mg by mouth once daily.      cranberry 500 mg Cap Take 1 capsule by mouth Daily.      furosemide (LASIX) 20 MG tablet Take 20 mg by mouth daily as needed.      NIFEdipine (ADALAT CC) 60 MG TbSR Take 60 mg by mouth once daily.      omega-3 fatty acids/fish oil (FISH OIL-OMEGA-3 FATTY ACIDS) 300-1,000 mg capsule Take by mouth once daily.      vitamin D (VITAMIN D3) 1000 units Tab Take 1,000 Units by mouth once daily.      pantoprazole (PROTONIX) 40 MG tablet Take 1 tablet (40 mg total) by mouth once daily. 30 tablet 11     No current facility-administered medications on file prior to visit.

## 2025-04-16 ENCOUNTER — LAB VISIT (OUTPATIENT)
Dept: LAB | Facility: HOSPITAL | Age: 85
End: 2025-04-16
Attending: INTERNAL MEDICINE
Payer: MEDICARE

## 2025-04-16 DIAGNOSIS — R19.5 ABNORMAL FECES: Primary | ICD-10-CM

## 2025-04-16 LAB
BASOPHILS # BLD AUTO: 0.07 X10(3)/MCL
BASOPHILS NFR BLD AUTO: 1.3 %
EOSINOPHIL # BLD AUTO: 0.12 X10(3)/MCL (ref 0–0.9)
EOSINOPHIL NFR BLD AUTO: 2.3 %
ERYTHROCYTE [DISTWIDTH] IN BLOOD BY AUTOMATED COUNT: 14.6 % (ref 11.5–17)
HCT VFR BLD AUTO: 37.6 % (ref 37–47)
HGB BLD-MCNC: 12.2 G/DL (ref 12–16)
IMM GRANULOCYTES # BLD AUTO: 0.02 X10(3)/MCL (ref 0–0.04)
IMM GRANULOCYTES NFR BLD AUTO: 0.4 %
LYMPHOCYTES # BLD AUTO: 1.81 X10(3)/MCL (ref 0.6–4.6)
LYMPHOCYTES NFR BLD AUTO: 34 %
MCH RBC QN AUTO: 29.9 PG (ref 27–31)
MCHC RBC AUTO-ENTMCNC: 32.4 G/DL (ref 33–36)
MCV RBC AUTO: 92.2 FL (ref 80–94)
MONOCYTES # BLD AUTO: 0.33 X10(3)/MCL (ref 0.1–1.3)
MONOCYTES NFR BLD AUTO: 6.2 %
NEUTROPHILS # BLD AUTO: 2.97 X10(3)/MCL (ref 2.1–9.2)
NEUTROPHILS NFR BLD AUTO: 55.8 %
NRBC BLD AUTO-RTO: 0 %
PLATELET # BLD AUTO: 199 X10(3)/MCL (ref 130–400)
PMV BLD AUTO: 10.6 FL (ref 7.4–10.4)
RBC # BLD AUTO: 4.08 X10(6)/MCL (ref 4.2–5.4)
WBC # BLD AUTO: 5.32 X10(3)/MCL (ref 4.5–11.5)

## 2025-04-16 PROCEDURE — 36415 COLL VENOUS BLD VENIPUNCTURE: CPT

## 2025-04-16 PROCEDURE — 85025 COMPLETE CBC W/AUTO DIFF WBC: CPT

## 2025-04-22 ENCOUNTER — OFFICE VISIT (OUTPATIENT)
Dept: SURGERY | Facility: CLINIC | Age: 85
End: 2025-04-22
Payer: MEDICARE

## 2025-04-22 VITALS
OXYGEN SATURATION: 96 % | BODY MASS INDEX: 36.84 KG/M2 | WEIGHT: 195.13 LBS | HEIGHT: 61 IN | SYSTOLIC BLOOD PRESSURE: 178 MMHG | DIASTOLIC BLOOD PRESSURE: 71 MMHG | HEART RATE: 60 BPM | TEMPERATURE: 98 F

## 2025-04-22 DIAGNOSIS — Z48.02 ENCOUNTER FOR STAPLE REMOVAL: Primary | ICD-10-CM

## 2025-04-22 PROCEDURE — 99024 POSTOP FOLLOW-UP VISIT: CPT | Mod: POP,,,

## 2025-04-22 NOTE — PROGRESS NOTES
Patient returned to clinic for remaining staple near umbilicus. Removed by Neelima SUERO Midline healing well. Wound precautions provided. RTC PRN.       4/22/2025     The above findings, diagnostics, and treatment plan were discussed with Dr. Casey Maradiaga who will follow with further assessments and recommendations. Please call with any questions, concerns, or clinical status changes.  This note/OR report was created with the assistance of  voice recognition software or phone  dictation.  There may be transcription errors as a result of using this technology however minimal. Effort has been made to assure accuracy of transcription but any obvious errors or omissions should be clarified with the author of the document.

## 2025-05-08 ENCOUNTER — TELEPHONE (OUTPATIENT)
Dept: SURGERY | Facility: CLINIC | Age: 85
End: 2025-05-08
Payer: MEDICARE

## 2025-05-13 ENCOUNTER — OFFICE VISIT (OUTPATIENT)
Dept: SURGERY | Facility: CLINIC | Age: 85
End: 2025-05-13
Payer: MEDICARE

## 2025-05-13 DIAGNOSIS — E66.9 OBESITY, UNSPECIFIED CLASS, UNSPECIFIED OBESITY TYPE, UNSPECIFIED WHETHER SERIOUS COMORBIDITY PRESENT: ICD-10-CM

## 2025-05-13 DIAGNOSIS — T81.89XA GRANULOMA OF SURGICAL WOUND, INITIAL ENCOUNTER: Primary | ICD-10-CM

## 2025-05-13 DIAGNOSIS — Z51.89 ENCOUNTER FOR WOUND CARE: ICD-10-CM

## 2025-05-13 RX ORDER — SILVER NITRATE 38.21; 12.74 MG/1; MG/1
2 STICK TOPICAL ONCE
Status: COMPLETED | OUTPATIENT
Start: 2025-05-13 | End: 2025-05-13

## 2025-05-13 RX ADMIN — SILVER NITRATE 2 APPLICATOR: 38.21; 12.74 STICK TOPICAL at 03:05

## 2025-05-13 NOTE — PROGRESS NOTES
Patient ID: [id number]   Chief Complaint: Post-op Evaluation (3/29 Internal hernia s/p lap converted to open, extensive adhesions, small bowel volvulus- Dr. Maradiaga)    HPI:     Nell Mock is a 84 y.o. female here today for postoperative visit of LAPAROSCOPY, DIAGNOSTIC (N/A) , extensive laparoscopic lysis of adhesions greater than 1 hour, exploratory laparotomy on 3/29/2025. Small bowel with many dense adhesions to the anterior abdominal wall causing small bowel volvulus, transition point found the anterior abdominal wall through small incisional hernia defect     Pt's reports two open wounds on her midline abd incision that is oozing clear fluid, mild pain, some tenderness and cannot wear tight/regular pants because of this. denies any abd pain. having normal bowel movements, passing flatus, no NVD. no constipation.     Patient Care Team:  Aj Colon MD as PCP - General (Family Medicine)   Past Medical History:   Diagnosis Date    Arthritis     CKD (chronic kidney disease) stage 3, GFR 30-59 ml/min     Digestive disorder     Essential (primary) hypertension     Leukemia     in remission    MI (myocardial infarction)       Past Surgical History:   Procedure Laterality Date    APPENDECTOMY      BLADDER SURGERY      BONE MARROW BIOPSY W/ ASPIRATION      CATARACT EXTRACTION W/  INTRAOCULAR LENS IMPLANT Bilateral     COLONOSCOPY N/A 09/13/2023    Procedure: COLON;  Surgeon: Camilo Gordillo MD;  Location: [facility] ENDOSCOPY;  Service: Gastroenterology;  Laterality: N/A;  LATEX ALLERGY    CORONARY ANGIOPLASTY WITH STENT PLACEMENT      DIAGNOSTIC LAPAROSCOPY N/A 3/29/2025    Procedure: LAPAROSCOPY, DIAGNOSTIC;  Surgeon: Casey Maradiaga MD;  Location: [facility] OR;  Service: General;  Laterality: N/A;    HYSTERECTOMY      INJECTION OF JOINT Right 6/12/2024    Procedure: Injection, Joint, Hip right hip injection with anesthesia;  Surgeon: Ismael Mackey MD;  Location: [facility] OR;  Service: Orthopedics;  Laterality:  Right;  right hip injection with anesthesia    NECK SURGERY      SINUS SURGERY      x3    TONSILLECTOMY        Social History     Tobacco Use    Smoking status: Never    Smokeless tobacco: Never   Substance and Sexual Activity    Alcohol use: Yes     Comment: wine yearly    Drug use: Never    Sexual activity: Not Currently     Partners: Male      Current Outpatient Medications   Medication Instructions    aspirin 81 mg, Oral, Daily    carvediloL (COREG) 12.5 mg, Oral, 2 times daily    co-enzyme Q-10 30 mg, Daily    cranberry 500 mg Cap 1 capsule, Daily    furosemide (LASIX) 20 mg, Daily PRN    NIFEdipine (ADALAT CC) 60 mg, Daily    omega-3 fatty acids/fish oil (FISH OIL-OMEGA-3 FATTY ACIDS) 300-1,000 mg capsule Daily    pantoprazole (PROTONIX) 40 mg, Oral, Daily    vitamin D (VITAMIN D3) 1,000 Units, Daily     Review of patient's allergies indicates:   Allergen Reactions    Latex Swelling    Benazepril      cough  cough      Crestor [rosuvastatin]     Amoxicillin-pot clavulanate      Patient does not recall reaction type  Patient does not recall reaction type      Ciprofloxacin Rash     Other reaction(s): Bactrim  Urticaria,hives  Urticaria,hives  Urticaria,hives  Urticaria,hives      Shellfish containing products Nausea Only    Sulfamethoxazole-trimethoprim Itching and Other (See Comments)     bleeding  Bleeding (C diff colitis)          Subjective:     Review of Systems    12 point review of systems conducted, negative except as stated in the history of present illness. See HPI for details.    Objective:     There were no vitals taken for this visit.    Physical Exam:  General:  Alert and oriented.    Gastrointestinal:  Soft, Non-tender, Non-distended, Normal bowel sounds.   Musculoskeletal:  Normal range of motion, Normal strength.    Integumentary:  Two incisional granulomas are present at the inferior aspect of the midline abdominal incision. Each lesion measures approximately 1 cm × 1 cm. The granulomas are  firm, well-circumscribed, and slightly raised above the skin surface. A small amount of serous drainage is noted on the gauze pad overlying the area. There is no surrounding erythema or purulent discharge. The appearance is consistent with suture granulomas, likely related to retained or reactive suture material.  Neurologic:  Alert, Oriented.    Psychiatric:  Cooperative.      Assessment:       ICD-10-CM ICD-9-CM   1. Granuloma of surgical wound, initial encounter  T81.89XA 998.59   2. Obesity, unspecified class, unspecified obesity type, unspecified whether serious comorbidity present  E66.9 278.00   3. Encounter for wound care  Z51.89 V58.89      Plan:     1. Granuloma of surgical wound, initial encounter    2. Obesity, unspecified class, unspecified obesity type, unspecified whether serious comorbidity present    3. Encounter for wound care    Other orders  -     silver nitrate applicators applicator 2 applicator      Treatment:  Both granulomas were treated with topical application of silver nitrate. Gauze was placed over the site following treatment.  Plan:  Patient to follow up as needed (PRN) if symptoms persist or worsen.  - ED precautions given to patient and is to call the office immediately or go to the emergency room if she notices any redness, swelling, severe pain, increase nausea/vomiting, fever, irregular bowel movements or severe diarrhea    I, LINDA Watson, am scribing for, and in the presence of, Casey Maradiaga MD, performed the above scribed service.       No future appointments.   LINDA Lazo

## 2025-05-29 ENCOUNTER — TELEPHONE (OUTPATIENT)
Dept: SURGERY | Facility: CLINIC | Age: 85
End: 2025-05-29
Payer: MEDICARE

## 2025-05-29 NOTE — TELEPHONE ENCOUNTER
Surgeon:  Dr. Casey Maradiaga   Procedure:  Dx lap  Procedure Date:  03/29/2025  Patient Concerns: patient called stating that she is concerned about her incision, she says it looks like two little blisters where her incision was she would like us to take a look at them and determine POC     She sent a photo and it is free from infection however it does look like a dissolvable stitch trying to come through the skin, will get jose Schultz NP and see what she will suggest

## 2025-06-16 ENCOUNTER — HOSPITAL ENCOUNTER (EMERGENCY)
Facility: HOSPITAL | Age: 85
Discharge: HOME | End: 2025-06-16
Attending: EMERGENCY MEDICINE
Payer: MEDICARE

## 2025-06-16 VITALS
SYSTOLIC BLOOD PRESSURE: 182 MMHG | TEMPERATURE: 99 F | HEIGHT: 61 IN | WEIGHT: 195 LBS | BODY MASS INDEX: 36.82 KG/M2 | HEART RATE: 87 BPM | DIASTOLIC BLOOD PRESSURE: 71 MMHG | OXYGEN SATURATION: 97 % | RESPIRATION RATE: 20 BRPM

## 2025-06-16 DIAGNOSIS — R10.33 PERIUMBILICAL ABDOMINAL PAIN: Primary | ICD-10-CM

## 2025-06-16 DIAGNOSIS — R19.8 UMBILICUS DISCHARGE: ICD-10-CM

## 2025-06-16 LAB
ALBUMIN SERPL-MCNC: 3.6 G/DL (ref 3.4–4.8)
ALBUMIN/GLOB SERPL: 0.8 RATIO (ref 1.1–2)
ALP SERPL-CCNC: 152 UNIT/L (ref 40–150)
ALT SERPL-CCNC: 20 UNIT/L (ref 0–55)
ANION GAP SERPL CALC-SCNC: 9 MEQ/L
AST SERPL-CCNC: 44 UNIT/L (ref 11–45)
BASOPHILS # BLD AUTO: 0.05 X10(3)/MCL
BASOPHILS NFR BLD AUTO: 0.8 %
BILIRUB SERPL-MCNC: 0.4 MG/DL
BILIRUB UR QL STRIP.AUTO: NEGATIVE
BUN SERPL-MCNC: 16.4 MG/DL (ref 9.8–20.1)
CALCIUM SERPL-MCNC: 9.8 MG/DL (ref 8.4–10.2)
CHLORIDE SERPL-SCNC: 106 MMOL/L (ref 98–107)
CLARITY UR: CLEAR
CO2 SERPL-SCNC: 26 MMOL/L (ref 23–31)
COLOR UR AUTO: NORMAL
CREAT SERPL-MCNC: 1.02 MG/DL (ref 0.55–1.02)
CREAT/UREA NIT SERPL: 16
EOSINOPHIL # BLD AUTO: 0.03 X10(3)/MCL (ref 0–0.9)
EOSINOPHIL NFR BLD AUTO: 0.5 %
ERYTHROCYTE [DISTWIDTH] IN BLOOD BY AUTOMATED COUNT: 13.5 % (ref 11.5–17)
GFR SERPLBLD CREATININE-BSD FMLA CKD-EPI: 55 ML/MIN/1.73/M2
GLOBULIN SER-MCNC: 4.4 GM/DL (ref 2.4–3.5)
GLUCOSE SERPL-MCNC: 93 MG/DL (ref 82–115)
GLUCOSE UR QL STRIP: NEGATIVE
HCT VFR BLD AUTO: 40.1 % (ref 37–47)
HGB BLD-MCNC: 13.4 G/DL (ref 12–16)
HGB UR QL STRIP: NEGATIVE
IMM GRANULOCYTES # BLD AUTO: 0.01 X10(3)/MCL (ref 0–0.04)
IMM GRANULOCYTES NFR BLD AUTO: 0.2 %
KETONES UR QL STRIP: NEGATIVE
LACTATE SERPL-SCNC: 1.1 MMOL/L (ref 0.5–2.2)
LEUKOCYTE ESTERASE UR QL STRIP: NEGATIVE
LYMPHOCYTES # BLD AUTO: 1.58 X10(3)/MCL (ref 0.6–4.6)
LYMPHOCYTES NFR BLD AUTO: 25.4 %
MCH RBC QN AUTO: 28.7 PG (ref 27–31)
MCHC RBC AUTO-ENTMCNC: 33.4 G/DL (ref 33–36)
MCV RBC AUTO: 85.9 FL (ref 80–94)
MONOCYTES # BLD AUTO: 0.29 X10(3)/MCL (ref 0.1–1.3)
MONOCYTES NFR BLD AUTO: 4.7 %
NEUTROPHILS # BLD AUTO: 4.27 X10(3)/MCL (ref 2.1–9.2)
NEUTROPHILS NFR BLD AUTO: 68.4 %
NITRITE UR QL STRIP: NEGATIVE
NRBC BLD AUTO-RTO: 0 %
PH UR STRIP: 6.5 [PH]
PLATELET # BLD AUTO: 184 X10(3)/MCL (ref 130–400)
PMV BLD AUTO: 11.2 FL (ref 7.4–10.4)
POTASSIUM SERPL-SCNC: 4.2 MMOL/L (ref 3.5–5.1)
PROT SERPL-MCNC: 8 GM/DL (ref 5.8–7.6)
PROT UR QL STRIP: NEGATIVE
RBC # BLD AUTO: 4.67 X10(6)/MCL (ref 4.2–5.4)
SODIUM SERPL-SCNC: 141 MMOL/L (ref 136–145)
SP GR UR STRIP.AUTO: <=1.005 (ref 1–1.03)
UROBILINOGEN UR STRIP-ACNC: 0.2
WBC # BLD AUTO: 6.23 X10(3)/MCL (ref 4.5–11.5)

## 2025-06-16 PROCEDURE — 96365 THER/PROPH/DIAG IV INF INIT: CPT | Mod: 59

## 2025-06-16 PROCEDURE — 80053 COMPREHEN METABOLIC PANEL: CPT | Performed by: EMERGENCY MEDICINE

## 2025-06-16 PROCEDURE — 96375 TX/PRO/DX INJ NEW DRUG ADDON: CPT

## 2025-06-16 PROCEDURE — 83605 ASSAY OF LACTIC ACID: CPT | Performed by: EMERGENCY MEDICINE

## 2025-06-16 PROCEDURE — 85025 COMPLETE CBC W/AUTO DIFF WBC: CPT | Performed by: EMERGENCY MEDICINE

## 2025-06-16 PROCEDURE — 25500020 PHARM REV CODE 255: Performed by: EMERGENCY MEDICINE

## 2025-06-16 PROCEDURE — 25000003 PHARM REV CODE 250: Performed by: EMERGENCY MEDICINE

## 2025-06-16 PROCEDURE — 87040 BLOOD CULTURE FOR BACTERIA: CPT | Performed by: EMERGENCY MEDICINE

## 2025-06-16 PROCEDURE — 63600175 PHARM REV CODE 636 W HCPCS: Mod: JZ,TB | Performed by: EMERGENCY MEDICINE

## 2025-06-16 PROCEDURE — 99285 EMERGENCY DEPT VISIT HI MDM: CPT | Mod: 25

## 2025-06-16 PROCEDURE — 81003 URINALYSIS AUTO W/O SCOPE: CPT | Performed by: EMERGENCY MEDICINE

## 2025-06-16 PROCEDURE — 96361 HYDRATE IV INFUSION ADD-ON: CPT

## 2025-06-16 RX ORDER — KETOROLAC TROMETHAMINE 30 MG/ML
15 INJECTION, SOLUTION INTRAMUSCULAR; INTRAVENOUS
Status: COMPLETED | OUTPATIENT
Start: 2025-06-16 | End: 2025-06-16

## 2025-06-16 RX ORDER — PANTOPRAZOLE SODIUM 40 MG/1
40 TABLET, DELAYED RELEASE ORAL DAILY
COMMUNITY

## 2025-06-16 RX ORDER — DICYCLOMINE HYDROCHLORIDE 10 MG/1
10 CAPSULE ORAL EVERY 12 HOURS PRN
Qty: 15 CAPSULE | Refills: 0 | Status: SHIPPED | OUTPATIENT
Start: 2025-06-16

## 2025-06-16 RX ORDER — NAPROXEN SODIUM 220 MG/1
81 TABLET, FILM COATED ORAL EVERY OTHER DAY
COMMUNITY

## 2025-06-16 RX ORDER — ONDANSETRON 4 MG/1
4 TABLET, FILM COATED ORAL EVERY 6 HOURS
Qty: 12 TABLET | Refills: 0 | Status: SHIPPED | OUTPATIENT
Start: 2025-06-16 | End: 2025-06-19

## 2025-06-16 RX ORDER — CLINDAMYCIN PHOSPHATE 600 MG/50ML
600 INJECTION, SOLUTION INTRAVENOUS
Status: COMPLETED | OUTPATIENT
Start: 2025-06-16 | End: 2025-06-16

## 2025-06-16 RX ORDER — CLINDAMYCIN HYDROCHLORIDE 300 MG/1
300 CAPSULE ORAL EVERY 8 HOURS
Qty: 21 CAPSULE | Refills: 0 | Status: SHIPPED | OUTPATIENT
Start: 2025-06-16 | End: 2025-06-23

## 2025-06-16 RX ADMIN — IOHEXOL 75 ML: 350 INJECTION, SOLUTION INTRAVENOUS at 04:06

## 2025-06-16 RX ADMIN — CLINDAMYCIN PHOSPHATE 600 MG: 600 INJECTION, SOLUTION INTRAVENOUS at 06:06

## 2025-06-16 RX ADMIN — KETOROLAC TROMETHAMINE 15 MG: 30 INJECTION, SOLUTION INTRAMUSCULAR at 06:06

## 2025-06-16 RX ADMIN — SODIUM CHLORIDE 500 ML: 9 INJECTION, SOLUTION INTRAVENOUS at 05:06

## 2025-06-16 NOTE — ED PROVIDER NOTES
Encounter Date: 6/16/2025       History     Chief Complaint   Patient presents with    Abdominal Pain     Pt c/o abd pain onset two months ago and is now worse. Hx of abd surgery in march. Pt also c/o having drainage from navel.     HPI abdominal pain worse than normal for past 2 - 3 days but also in association with drainage from her belly  button that she has had since having a small bowel obstruction repaired by Dr Maradiaga. She denies fever or chills. Says had normal BM earlier today. No back pain  Review of patient's allergies indicates:   Allergen Reactions    Latex Swelling    Benazepril      cough  cough      Crestor [rosuvastatin]     Amoxicillin-pot clavulanate      Patient does not recall reaction type  Patient does not recall reaction type      Ciprofloxacin Rash     Other reaction(s): Bactrim  Urticaria,hives  Urticaria,hives  Urticaria,hives  Urticaria,hives      Shellfish containing products Nausea Only    Sulfamethoxazole-trimethoprim Itching and Other (See Comments)     bleeding  Bleeding (C diff colitis)       Past Medical History:   Diagnosis Date    Arthritis     CKD (chronic kidney disease) stage 3, GFR 30-59 ml/min     Digestive disorder     Essential (primary) hypertension     Leukemia     in remission    MI (myocardial infarction)      Past Surgical History:   Procedure Laterality Date    APPENDECTOMY      BLADDER SURGERY      BONE MARROW BIOPSY W/ ASPIRATION      CATARACT EXTRACTION W/  INTRAOCULAR LENS IMPLANT Bilateral     COLONOSCOPY N/A 09/13/2023    Procedure: COLON;  Surgeon: Camilo Gordillo MD;  Location[facility]COPY;  Service: Gastroenterology;  Laterality: N/A;  LATEX ALLERGY    CORONARY ANGIOPLASTY WITH STENT PLACEMENT      DIAGNOSTIC LAPAROSCOPY N/A 3/29/2025    Procedure: LAPAROSCOPY, DIAGNOSTIC;  Surgeon: Casey Maradiaga MD;  Location: [facility] OR;  Service: General;  Laterality: N/A;    HYSTERECTOMY      INJECTION OF JOINT Right 6/12/2024    Procedure: Injection, Joint, Hip  right hip injection with anesthesia;  Surgeon: Ismael Mackey MD;  Location: [facility];  Service: Orthopedics;  Laterality: Right;  right hip injection with anesthesia    NECK SURGERY      SINUS SURGERY      x3    TONSILLECTOMY       Family History   Family history unknown: Yes     Social History[1]  Review of Systems   Constitutional:  Positive for activity change.   HENT: Negative.     Respiratory: Negative.     Cardiovascular: Negative.    Gastrointestinal:  Positive for abdominal pain and nausea.   Musculoskeletal: Negative.    Neurological:  Positive for weakness.   Psychiatric/Behavioral: Negative.     All other systems reviewed and are negative.      Physical Exam     Initial Vitals [06/16/25 1407]   BP Pulse Resp Temp SpO2   (!) 182/71 87 20 98.6 °F (37 °C) 97 %      MAP       --         Physical Exam    Nursing note and vitals reviewed.  Constitutional: She appears well-developed and well-nourished.   HENT:   Head: Normocephalic and atraumatic.   Eyes: EOM are normal. Pupils are equal, round, and reactive to light.   Neck: Neck supple.   Normal range of motion.  Abdominal: Abdomen is soft.   Protuberant large belly with tenderness in periumbilical region / no rebound or guarding. Dry dc near belly button noted/no  redness or warmth   Musculoskeletal:         General: Normal range of motion.      Cervical back: Normal range of motion and neck supple.     Neurological: She is alert and oriented to person, place, and time.   Skin: Skin is warm.   Psychiatric: She has a normal mood and affect.         ED Course   Procedures  Labs Reviewed   CBC WITH DIFFERENTIAL - Abnormal       Result Value    WBC 6.23      RBC 4.67      Hgb 13.4      Hct 40.1      MCV 85.9      MCH 28.7      MCHC 33.4      RDW 13.5      Platelet 184      MPV 11.2 (*)     Neut % 68.4      Lymph % 25.4      Mono % 4.7      Eos % 0.5      Basophil % 0.8      Imm Grans % 0.2      Neut # 4.27      Lymph # 1.58      Mono # 0.29      Eos # 0.03       Baso # 0.05      Imm Gran # 0.01      NRBC% 0.0     COMPREHENSIVE METABOLIC PANEL - Abnormal    Sodium 141      Potassium 4.2      Chloride 106      CO2 26      Glucose 93      Blood Urea Nitrogen 16.4      Creatinine 1.02      Calcium 9.8      Protein Total 8.0 (*)     Albumin 3.6      Globulin 4.4 (*)     Albumin/Globulin Ratio 0.8 (*)     Bilirubin Total 0.4       (*)     ALT 20      AST 44      eGFR 55      Anion Gap 9.0      BUN/Creatinine Ratio 16     URINALYSIS, REFLEX TO URINE CULTURE - Normal    Color, UA Straw      Appearance, UA Clear      Specific Gravity, UA <=1.005      pH, UA 6.5      Protein, UA Negative      Glucose, UA Negative      Ketones, UA Negative      Blood, UA Negative      Bilirubin, UA Negative      Urobilinogen, UA 0.2      Nitrites, UA Negative      Leukocyte Esterase, UA Negative     LACTIC ACID, PLASMA - Normal    Lactic Acid Level 1.1     BLOOD CULTURE OLG   BLOOD CULTURE OLG   CBC W/ AUTO DIFFERENTIAL    Narrative:     The following orders were created for panel order CBC auto differential.  Procedure                               Abnormality         Status                     ---------                               -----------         ------                     CBC with Differential[[id number]]       Abnormal            Final result                 Please view results for these tests on the individual orders.          Imaging Results              CT Abdomen Pelvis With IV Contrast NO Oral Contrast (Final result)  Result time 06/16/25 17:01:55      Final result by Gena Jacobson MD (06/16/25 17:01:55)                   Impression:      Multiple scattered nodules in the lungs bilaterally stable since prior examination    Stable small cystic lesion in the tail the pancreas.  It could represent a benign cyst versus IPMN.  Continued follow-up is recommended in 1 year.  The lesion is stable since 02/12/2024    Bilateral renal cysts    Some inflammatory changes seen in  the mesentery in the left mid abdomen possibly representing mesenteritis/panniculitis para small hiatal hernia      Electronically signed by: Darwin Jacobson  Date:    06/16/2025  Time:    17:01               Narrative:    EXAMINATION:  CT ABDOMEN PELVIS WITH IV CONTRAST    CLINICAL HISTORY:  Abdominal abscess/infection suspected;draining from belly button/ hx endoscopic surgery;    TECHNIQUE:  Low dose axial images, sagittal and coronal reformations were obtained from the lung bases to the pubic symphysis following the IV administration of contrast. Automatic exposure control (AEC) is utilized to reduce patient radiation exposure.    COMPARISON:  03/29/2025 and 02/12/2024    FINDINGS:  There are a few lung nodules seen in the lungs bilaterally which were also seen on the prior examination.  There appear relatively stable..    The liver appears normal.  No liver mass or lesion is seen.  Portal and hepatic veins appear normal.    The patient is status post cholecystectomy.    There is a small hiatal hernia.    The pancreas is normal in size.  No inflammatory changes are seen.  There is a punctate cystic area seen in the tail the pancreas that is stable since prior examination..    The spleen shows no acute abnormality.    The adrenal glands appear normal.  No adrenal nodule is seen.    There is some cysts seen in both kidneys.  No hydronephrosis is seen.  No hydroureter is seen.  No nephrolithiasis is seen.  No obvious ureteral stones are seen.    Some inflammatory changes seen in the mid mesentery in the left mid abdomen.  Findings are nonspecific and may represent a mesenteritis.    Urinary bladder appears grossly unremarkable.    There are postsurgical changes seen in the anterior abdominal wall in the periumbilical region.  There was a hernia seen in this region on the prior examination it appears to have undergone surgical repair.  No residual hernia is seen.  No colitis is seen.  No diverticulitis is seen.  No  obvious colonic mass or lesion is seen.    No free air is seen.  No free fluid is seen.                                       Medications   clindamycin in D5W 600 mg/50 mL IVPB 600 mg (600 mg Intravenous New Bag 6/16/25 1815)   iohexoL (OMNIPAQUE 350) injection 75 mL (75 mLs Intravenous Given 6/16/25 1643)   sodium chloride 0.9% bolus 500 mL 500 mL (0 mLs Intravenous Stopped 6/16/25 1815)   ketorolac injection 15 mg (15 mg Intravenous Given 6/16/25 1814)     Medical Decision Making  Differential would include chronic fistula from prior surgery vs intraabdominal abscess vs hernia complication  I reviewed her labs which were stable  and not requiring immediate attention. with normal WBC and no fever.   Her urine is also clear for infection  CT abd/pelvis with IV contrast shows changes that may reflect Mesenteriis /panniculitis also with stable panreatic growth.  I reviewed the findings with patient to develop home care plan.   I used a qtip and probed belly button- small piece dry scab removed with scantblood on qtip but no active drainage or bleeding.  I discussed putting patient on antibiotics to see if may be has skin in scar line vs intra-abdominal fistula.   I will give her a dose of antibioitcs now and she will see Dr Robert at end of week -if sx persist he will refer her to [facility] wound care.  She is comfortable with this plan.    Amount and/or Complexity of Data Reviewed  Labs: ordered. Decision-making details documented in ED Course.  Radiology: ordered. Decision-making details documented in ED Course.  Discussion of management or test interpretation with external provider(s): Patient with lots of drug allergies (cipro, augmentin, bactrim) - no diarrhea so I will put her on cleocin as if it from skin and have her followup with dr robert Friday.     Risk  Prescription drug management.               ED Course as of 06/16/25 1817 Mon Jun 16, 2025   1500 Patient had sbo repaired surgically at the end of March  she tells me- she says she has been draining from belly button site ever since. Saw her surgeon once after surgery for suture removal but she continues to drain. Has had more severe pain for last 2 days. [LG]      ED Course User Index  [LG] Yuliana Flores DO        I will pack her belly button with a piece of Idofoam and apply bandaid on top to see if catches any drainage. I advised to have dr robert change on Friday when she sees him and to wet it first.                   Clinical Impression:abdominal pain  Final diagnoses:  [R10.33] Periumbilical abdominal pain (Primary)  [R19.8] Umbilicus discharge          ED Disposition Condition    Discharge Stable          ED Prescriptions       Medication Sig Dispense Start Date End Date Auth. Provider    clindamycin (CLEOCIN) 300 MG capsule Take 1 capsule (300 mg total) by mouth every 8 (eight) hours. for 7 days 21 capsule 6/16/2025 6/23/2025 Yuliana Flores DO    dicyclomine (BENTYL) 10 MG capsule Take 1 capsule (10 mg total) by mouth every 12 (twelve) hours as needed (stomach cramping). 15 capsule 6/16/2025 -- Yuliana Flores DO    ondansetron (ZOFRAN) 4 MG tablet Take 1 tablet (4 mg total) by mouth every 6 (six) hours. for 12 doses 12 tablet 6/16/2025 6/19/2025 Yuliana Flores DO          Follow-up Information    None                [1]   Social History  Tobacco Use    Smoking status: Never    Smokeless tobacco: Never   Substance Use Topics    Alcohol use: Yes     Comment: wine yearly    Drug use: Never        Yuliana Flores DO  06/16/25 [id number]

## 2025-06-16 NOTE — DISCHARGE INSTRUCTIONS
We packed your wound today-If possible -leave packing until til you are seenby Dr Mock at end of week (call office for recheck).   Ask the office to remove packing (but wet it first before removing) and then repack with saline moistened 1/4 iodofoam. Cover with gauze or a bandaid and change this outer dressing ateast daily or more often if needed based on drainage.  Begin cleocin and take for next week for possible infection   Increase water and rest next few days  Can try bentyl for stomach cramping if needed  When Dr Mock sees you later in week have him either refer you to [facility] wound care if the drainage is still noted from belly button or follow you in office.  If sx worsen or change or new sx develop-return to ER

## 2025-06-18 ENCOUNTER — RESULTS FOLLOW-UP (OUTPATIENT)
Dept: EMERGENCY MEDICINE | Facility: HOSPITAL | Age: 85
End: 2025-06-18

## 2025-06-21 LAB
BACTERIA BLD CULT: NORMAL
BACTERIA BLD CULT: NORMAL

## 2025-06-24 ENCOUNTER — TELEPHONE (OUTPATIENT)
Dept: SURGERY | Facility: CLINIC | Age: 85
End: 2025-06-24

## 2025-06-24 ENCOUNTER — OFFICE VISIT (OUTPATIENT)
Dept: SURGERY | Facility: CLINIC | Age: 85
End: 2025-06-24
Payer: MEDICARE

## 2025-06-24 VITALS
BODY MASS INDEX: 36.82 KG/M2 | DIASTOLIC BLOOD PRESSURE: 76 MMHG | HEIGHT: 61 IN | WEIGHT: 195 LBS | SYSTOLIC BLOOD PRESSURE: 145 MMHG | HEART RATE: 80 BPM

## 2025-06-24 DIAGNOSIS — Z98.890 S/P LAPAROTOMY WITH LYSIS OF ADHESIONS: Primary | ICD-10-CM

## 2025-06-24 DIAGNOSIS — K86.2 CYST OF PANCREAS: ICD-10-CM

## 2025-06-24 DIAGNOSIS — Z51.89 ENCOUNTER FOR WOUND CARE: ICD-10-CM

## 2025-06-24 DIAGNOSIS — N28.1 BILATERAL RENAL CYSTS: ICD-10-CM

## 2025-06-24 NOTE — PROGRESS NOTES
Patient ID: [id number]   Chief Complaint: Post-op Evaluation (POST-OP - *Wound eval 3/29 Diagnostic lap, SBO w/ small bowel volvulus 6/16 Went to ED c/o drainage from incision in belly button/)    HPI:     Nell Mock is a 84 y.o. female here today for umbilical wound evaluation s/p LAPAROSCOPY, DIAGNOSTIC (N/A) , extensive laparoscopic lysis of adhesions greater than 1 hour, exploratory laparotomy on 3/29/2025. Small bowel with many dense adhesions to the anterior abdominal wall causing small bowel volvulus, transition point found the anterior abdominal wall through small incisional hernia defect      Last visit Dr. Maradiaga silver nitrated two incsional granulomas which have since healed but pt c/p umbilicus drainage since that time that is yellow and occasionally foul smelling. Pt was seen in ER on 6/16/25 regarding this issue. Given Cleocin and since that time drainage has stopped. CT was completed that stated possible mesenteritis/panniculitis in the area of concern. Denies any NV, Bms normal. Mild tenderness still over umbilicus and cannot wear tight pants over that area. No fever or redness surrounding her umbilicus.     Patient Care Team:  Aj Colon MD as PCP - General (Family Medicine)  Casey Maradiaga MD as Consulting Physician (General Surgery)   Past Medical History:   Diagnosis Date    Arthritis     CKD (chronic kidney disease) stage 3, GFR 30-59 ml/min     Digestive disorder     Essential (primary) hypertension     Leukemia     in remission    MI (myocardial infarction)       Past Surgical History:   Procedure Laterality Date    APPENDECTOMY      BLADDER SURGERY      BONE MARROW BIOPSY W/ ASPIRATION      CATARACT EXTRACTION W/  INTRAOCULAR LENS IMPLANT Bilateral     COLONOSCOPY N/A 09/13/2023    Procedure: COLON;  Surgeon: Camilo Gordillo MD;  Location: [facility] ENDOSCOPY;  Service: Gastroenterology;  Laterality: N/A;  LATEX ALLERGY    CORONARY ANGIOPLASTY WITH STENT PLACEMENT       DIAGNOSTIC LAPAROSCOPY N/A 3/29/2025    Procedure: LAPAROSCOPY, DIAGNOSTIC;  Surgeon: Casey Maradiaga MD;  Location: [facility] OR;  Service: General;  Laterality: N/A;    HYSTERECTOMY      INJECTION OF JOINT Right 6/12/2024    Procedure: Injection, Joint, Hip right hip injection with anesthesia;  Surgeon: Ismael Mackey MD;  Location: [facility] OR;  Service: Orthopedics;  Laterality: Right;  right hip injection with anesthesia    NECK SURGERY      SINUS SURGERY      x3    TONSILLECTOMY        Social History     Tobacco Use    Smoking status: Never    Smokeless tobacco: Never   Substance and Sexual Activity    Alcohol use: Yes     Comment: wine yearly    Drug use: Never    Sexual activity: Not Currently     Partners: Male      Current Outpatient Medications   Medication Instructions    aspirin 81 mg, Every other day    carvediloL (COREG) 12.5 mg, Oral, 2 times daily    co-enzyme Q-10 30 mg, Daily    cranberry 500 mg Cap 1 capsule, Daily    dicyclomine (BENTYL) 10 mg, Oral, Every 12 hours PRN    furosemide (LASIX) 20 mg, Daily PRN    NIFEdipine (ADALAT CC) 60 mg, Daily    omega-3 fatty acids/fish oil (FISH OIL-OMEGA-3 FATTY ACIDS) 300-1,000 mg capsule Daily    pantoprazole (PROTONIX) 40 mg, Daily    vitamin D (VITAMIN D3) 1,000 Units, Daily     Review of patient's allergies indicates:   Allergen Reactions    Latex Swelling    Benazepril      cough  cough      Crestor [rosuvastatin]     Amoxicillin-pot clavulanate      Patient does not recall reaction type  Patient does not recall reaction type      Ciprofloxacin Rash     Other reaction(s): Bactrim  Urticaria,hives  Urticaria,hives  Urticaria,hives  Urticaria,hives      Shellfish containing products Nausea Only    Sulfamethoxazole-trimethoprim Itching and Other (See Comments)     bleeding  Bleeding (C diff colitis)          Subjective:     Review of Systems    12 point review of systems conducted, negative except as stated in the history of present illness. See HPI for  "details.    Objective:     Visit Vitals  BP (!) 145/76   Pulse 80   Ht 5' 1" (1.549 m)   Wt 88.5 kg (195 lb)   BMI 36.84 kg/m²       Physical Exam:  General:  Alert and oriented.    Gastrointestinal:  Soft, Non-tender, Non-distended, Normal bowel sounds. Laprotomy midline abdominal wound well healed. Granulomas healed. No drainage in umbilicus noted, cleaned with q tip which did not ellicit any draiange. Neosporing applied with q tip in umbilical and bandaid applied.   Musculoskeletal:  Normal range of motion, Normal strength.    Integumentary:  Warm, Dry, Pink.    Neurologic:  Alert, Oriented.    Psychiatric:  Cooperative.      Assessment:       ICD-10-CM ICD-9-CM   1. S/P laparotomy with lysis of adhesions  Z98.890 V45.89   2. Encounter for wound care  Z51.89 V58.89   3. Bilateral renal cysts  N28.1 753.10   4. Cyst of pancreas  K86.2 577.2        Plan:     1. S/P laparotomy with lysis of adhesions    2. Encounter for wound care    3. Bilateral renal cysts    4. Cyst of pancreas       - Incisions healing well  - continue to use neosporin in umbilicus until you see Dr. Mock.   - f/u with PCP regarding Stable small cystic lesion in the tail the pancreas. Continued follow-up is recommended in 1 year.  The lesion is stable since 02/12/2024   and Bilateral renal cysts  - ED precautions given to patient and is to call the office immediately or go to the emergency room if she notices any redness, swelling, severe pain, increase nausea/vomiting, fever, irregular bowel movements or severe diarrhea    No future appointments.   LINDA Lazo    "

## 2025-07-01 ENCOUNTER — TELEPHONE (OUTPATIENT)
Dept: PRIMARY CARE CLINIC | Facility: CLINIC | Age: 85
End: 2025-07-01
Payer: MEDICARE

## 2025-07-01 NOTE — TELEPHONE ENCOUNTER
Copied from CRM #[id number]. Topic: General Inquiry - Information Request  >> Jul 1, 2025  8:31 AM Evarisot wrote:  Who Called: Nellmike Mock    Caller is requesting assistance/information from provider's office.    Symptoms (please be specific):    How long has patient had these symptoms:    List of preferred pharmacies on file (remove unneeded): [unfilled]  If different, enter pharmacy into here including location and phone number:       Preferred Method of Contact: Phone Call  Patient's Preferred Phone Number on File:   Best Call Back Number, if different:[phone]  Additional Information: CT was sent by Dr. Casey Maradiaga office wants to confirm that it was received. Please advise.

## 2025-08-17 ENCOUNTER — HOSPITAL ENCOUNTER (OUTPATIENT)
Facility: HOSPITAL | Age: 85
LOS: 1 days | Discharge: HOME | End: 2025-08-20
Attending: EMERGENCY MEDICINE | Admitting: STUDENT IN AN ORGANIZED HEALTH CARE EDUCATION/TRAINING PROGRAM
Payer: MEDICARE

## 2025-08-17 DIAGNOSIS — R13.10 DYSPHAGIA, UNSPECIFIED TYPE: ICD-10-CM

## 2025-08-17 DIAGNOSIS — K57.30 DIVERTICULA OF COLON: ICD-10-CM

## 2025-08-17 DIAGNOSIS — N17.9 AKI (ACUTE KIDNEY INJURY): Primary | ICD-10-CM

## 2025-08-17 DIAGNOSIS — I25.10 CORONARY ARTERY DISEASE WITHOUT ANGINA PECTORIS, UNSPECIFIED VESSEL OR LESION TYPE, UNSPECIFIED WHETHER NATIVE OR TRANSPLANTED HEART: ICD-10-CM

## 2025-08-17 DIAGNOSIS — I25.10 CAD (CORONARY ARTERY DISEASE): ICD-10-CM

## 2025-08-17 DIAGNOSIS — R07.9 CHEST PAIN: ICD-10-CM

## 2025-08-17 DIAGNOSIS — R53.1 WEAKNESS: ICD-10-CM

## 2025-08-17 DIAGNOSIS — R79.89 ELEVATED TROPONIN: ICD-10-CM

## 2025-08-17 DIAGNOSIS — I50.9 HEART FAILURE: ICD-10-CM

## 2025-08-17 PROBLEM — N18.30 STAGE 3 CHRONIC KIDNEY DISEASE: Status: ACTIVE | Noted: 2025-08-17

## 2025-08-17 PROBLEM — I10 HTN (HYPERTENSION): Status: ACTIVE | Noted: 2025-08-17

## 2025-08-17 LAB
ALBUMIN SERPL-MCNC: 3.8 G/DL (ref 3.4–4.8)
ALBUMIN/GLOB SERPL: 0.8 RATIO (ref 1.1–2)
ALP SERPL-CCNC: 151 UNIT/L (ref 40–150)
ALT SERPL-CCNC: 24 UNIT/L (ref 0–55)
ANION GAP SERPL CALC-SCNC: 12 MEQ/L
AST SERPL-CCNC: 48 UNIT/L (ref 11–45)
BACTERIA #/AREA URNS AUTO: NORMAL /HPF
BASOPHILS # BLD AUTO: 0.06 X10(3)/MCL
BASOPHILS NFR BLD AUTO: 1 %
BILIRUB SERPL-MCNC: 0.4 MG/DL
BILIRUB UR QL STRIP.AUTO: NEGATIVE
BUN SERPL-MCNC: 15.7 MG/DL (ref 9.8–20.1)
CALCIUM SERPL-MCNC: 10 MG/DL (ref 8.4–10.2)
CHLORIDE SERPL-SCNC: 104 MMOL/L (ref 98–107)
CLARITY UR: CLEAR
CO2 SERPL-SCNC: 24 MMOL/L (ref 23–31)
COLOR UR AUTO: ABNORMAL
CREAT SERPL-MCNC: 1.29 MG/DL (ref 0.55–1.02)
CREAT/UREA NIT SERPL: 12
EOSINOPHIL # BLD AUTO: 0.05 X10(3)/MCL (ref 0–0.9)
EOSINOPHIL NFR BLD AUTO: 0.8 %
ERYTHROCYTE [DISTWIDTH] IN BLOOD BY AUTOMATED COUNT: 14.5 % (ref 11.5–17)
FLUAV AG UPPER RESP QL IA.RAPID: NOT DETECTED
FLUBV AG UPPER RESP QL IA.RAPID: NOT DETECTED
GFR SERPLBLD CREATININE-BSD FMLA CKD-EPI: 42 ML/MIN/1.73/M2
GLOBULIN SER-MCNC: 4.7 GM/DL (ref 2.4–3.5)
GLUCOSE SERPL-MCNC: 103 MG/DL (ref 82–115)
GLUCOSE UR QL STRIP: NEGATIVE
HCT VFR BLD AUTO: 43.5 % (ref 37–47)
HGB BLD-MCNC: 14.2 G/DL (ref 12–16)
HGB UR QL STRIP: ABNORMAL
IMM GRANULOCYTES # BLD AUTO: 0 X10(3)/MCL (ref 0–0.04)
IMM GRANULOCYTES NFR BLD AUTO: 0 %
KETONES UR QL STRIP: NEGATIVE
LEUKOCYTE ESTERASE UR QL STRIP: ABNORMAL
LYMPHOCYTES # BLD AUTO: 2.32 X10(3)/MCL (ref 0.6–4.6)
LYMPHOCYTES NFR BLD AUTO: 36.9 %
MCH RBC QN AUTO: 28.2 PG (ref 27–31)
MCHC RBC AUTO-ENTMCNC: 32.6 G/DL (ref 33–36)
MCV RBC AUTO: 86.5 FL (ref 80–94)
MONOCYTES # BLD AUTO: 0.33 X10(3)/MCL (ref 0.1–1.3)
MONOCYTES NFR BLD AUTO: 5.2 %
NEUTROPHILS # BLD AUTO: 3.53 X10(3)/MCL (ref 2.1–9.2)
NEUTROPHILS NFR BLD AUTO: 56.1 %
NITRITE UR QL STRIP: NEGATIVE
NRBC BLD AUTO-RTO: 0 %
NT-PROBNP SERPL-MCNC: 247 PG/ML
PH UR STRIP: 7.5 [PH]
PLATELET # BLD AUTO: 190 X10(3)/MCL (ref 130–400)
PMV BLD AUTO: 11 FL (ref 7.4–10.4)
POTASSIUM SERPL-SCNC: 3.9 MMOL/L (ref 3.5–5.1)
PROT SERPL-MCNC: 8.5 GM/DL (ref 5.8–7.6)
PROT UR QL STRIP: NEGATIVE
RBC # BLD AUTO: 5.03 X10(6)/MCL (ref 4.2–5.4)
RBC #/AREA URNS AUTO: NORMAL /HPF
SARS-COV-2 RNA RESP QL NAA+PROBE: NOT DETECTED
SODIUM SERPL-SCNC: 140 MMOL/L (ref 136–145)
SP GR UR STRIP.AUTO: 1.01 (ref 1–1.03)
SQUAMOUS #/AREA URNS AUTO: NORMAL /HPF
TROPONIN I SERPL HS-MCNC: 38 NG/L
TROPONIN I SERPL HS-MCNC: 45 NG/L
TROPONIN I SERPL HS-MCNC: 46 NG/L
UROBILINOGEN UR STRIP-ACNC: 0.2
WBC # BLD AUTO: 6.29 X10(3)/MCL (ref 4.5–11.5)
WBC #/AREA URNS AUTO: NORMAL /HPF

## 2025-08-17 PROCEDURE — 11000001 HC ACUTE MED/SURG PRIVATE ROOM

## 2025-08-17 PROCEDURE — 84484 ASSAY OF TROPONIN QUANT: CPT | Performed by: INTERNAL MEDICINE

## 2025-08-17 PROCEDURE — 63600175 PHARM REV CODE 636 W HCPCS: Performed by: INTERNAL MEDICINE

## 2025-08-17 PROCEDURE — 87636 SARSCOV2 & INF A&B AMP PRB: CPT

## 2025-08-17 PROCEDURE — 99285 EMERGENCY DEPT VISIT HI MDM: CPT | Mod: 25

## 2025-08-17 PROCEDURE — 25000003 PHARM REV CODE 250

## 2025-08-17 PROCEDURE — 83880 ASSAY OF NATRIURETIC PEPTIDE: CPT

## 2025-08-17 PROCEDURE — 93005 ELECTROCARDIOGRAM TRACING: CPT

## 2025-08-17 PROCEDURE — 25000003 PHARM REV CODE 250: Performed by: INTERNAL MEDICINE

## 2025-08-17 PROCEDURE — 81001 URINALYSIS AUTO W/SCOPE: CPT

## 2025-08-17 PROCEDURE — 84484 ASSAY OF TROPONIN QUANT: CPT

## 2025-08-17 PROCEDURE — 85025 COMPLETE CBC W/AUTO DIFF WBC: CPT

## 2025-08-17 PROCEDURE — 80053 COMPREHEN METABOLIC PANEL: CPT

## 2025-08-17 RX ORDER — LIDOCAINE HYDROCHLORIDE 20 MG/ML
15 SOLUTION OROPHARYNGEAL ONCE
Status: COMPLETED | OUTPATIENT
Start: 2025-08-17 | End: 2025-08-17

## 2025-08-17 RX ORDER — IBUPROFEN 200 MG
16 TABLET ORAL
Status: DISCONTINUED | OUTPATIENT
Start: 2025-08-17 | End: 2025-08-20 | Stop reason: HOSPADM

## 2025-08-17 RX ORDER — SODIUM CHLORIDE 9 MG/ML
1000 INJECTION, SOLUTION INTRAVENOUS
Status: COMPLETED | OUTPATIENT
Start: 2025-08-17 | End: 2025-08-17

## 2025-08-17 RX ORDER — DICYCLOMINE HYDROCHLORIDE 10 MG/1
10 CAPSULE ORAL EVERY 12 HOURS PRN
Status: DISCONTINUED | OUTPATIENT
Start: 2025-08-17 | End: 2025-08-20 | Stop reason: HOSPADM

## 2025-08-17 RX ORDER — ONDANSETRON HYDROCHLORIDE 2 MG/ML
4 INJECTION, SOLUTION INTRAVENOUS EVERY 8 HOURS PRN
Status: DISCONTINUED | OUTPATIENT
Start: 2025-08-17 | End: 2025-08-20 | Stop reason: HOSPADM

## 2025-08-17 RX ORDER — PANTOPRAZOLE SODIUM 40 MG/1
40 TABLET, DELAYED RELEASE ORAL DAILY
Status: DISCONTINUED | OUTPATIENT
Start: 2025-08-18 | End: 2025-08-20 | Stop reason: HOSPADM

## 2025-08-17 RX ORDER — NALOXONE HCL 0.4 MG/ML
0.02 VIAL (ML) INJECTION
Status: DISCONTINUED | OUTPATIENT
Start: 2025-08-17 | End: 2025-08-20 | Stop reason: HOSPADM

## 2025-08-17 RX ORDER — SODIUM CHLORIDE 0.9 % (FLUSH) 0.9 %
10 SYRINGE (ML) INJECTION EVERY 12 HOURS PRN
Status: DISCONTINUED | OUTPATIENT
Start: 2025-08-17 | End: 2025-08-20 | Stop reason: HOSPADM

## 2025-08-17 RX ORDER — ACETAMINOPHEN 325 MG/1
650 TABLET ORAL EVERY 8 HOURS PRN
Status: DISCONTINUED | OUTPATIENT
Start: 2025-08-17 | End: 2025-08-20 | Stop reason: HOSPADM

## 2025-08-17 RX ORDER — CARVEDILOL 12.5 MG/1
12.5 TABLET ORAL 2 TIMES DAILY
Status: DISCONTINUED | OUTPATIENT
Start: 2025-08-17 | End: 2025-08-20 | Stop reason: HOSPADM

## 2025-08-17 RX ORDER — IBUPROFEN 200 MG
24 TABLET ORAL
Status: DISCONTINUED | OUTPATIENT
Start: 2025-08-17 | End: 2025-08-20 | Stop reason: HOSPADM

## 2025-08-17 RX ORDER — GLUCAGON 1 MG
1 KIT INJECTION
Status: DISCONTINUED | OUTPATIENT
Start: 2025-08-17 | End: 2025-08-20 | Stop reason: HOSPADM

## 2025-08-17 RX ORDER — CHOLECALCIFEROL (VITAMIN D3) 25 MCG
2000 TABLET ORAL DAILY
Status: DISCONTINUED | OUTPATIENT
Start: 2025-08-18 | End: 2025-08-20 | Stop reason: HOSPADM

## 2025-08-17 RX ORDER — NAPROXEN SODIUM 220 MG/1
81 TABLET, FILM COATED ORAL EVERY OTHER DAY
Status: DISCONTINUED | OUTPATIENT
Start: 2025-08-18 | End: 2025-08-20 | Stop reason: HOSPADM

## 2025-08-17 RX ORDER — ALUMINUM HYDROXIDE, MAGNESIUM HYDROXIDE, AND SIMETHICONE 1200; 120; 1200 MG/30ML; MG/30ML; MG/30ML
30 SUSPENSION ORAL ONCE
Status: COMPLETED | OUTPATIENT
Start: 2025-08-17 | End: 2025-08-17

## 2025-08-17 RX ORDER — HEPARIN SODIUM 5000 [USP'U]/ML
5000 INJECTION, SOLUTION INTRAVENOUS; SUBCUTANEOUS EVERY 8 HOURS
Status: DISCONTINUED | OUTPATIENT
Start: 2025-08-17 | End: 2025-08-20 | Stop reason: HOSPADM

## 2025-08-17 RX ORDER — NIFEDIPINE 60 MG/1
60 TABLET, EXTENDED RELEASE ORAL DAILY
Status: DISCONTINUED | OUTPATIENT
Start: 2025-08-18 | End: 2025-08-20 | Stop reason: HOSPADM

## 2025-08-17 RX ORDER — ACETAMINOPHEN 325 MG/1
650 TABLET ORAL EVERY 4 HOURS PRN
Status: DISCONTINUED | OUTPATIENT
Start: 2025-08-17 | End: 2025-08-20 | Stop reason: HOSPADM

## 2025-08-17 RX ADMIN — CARVEDILOL 12.5 MG: 12.5 TABLET, FILM COATED ORAL at 10:08

## 2025-08-17 RX ADMIN — HEPARIN SODIUM 5000 UNITS: 5000 INJECTION, SOLUTION INTRAVENOUS; SUBCUTANEOUS at 10:08

## 2025-08-17 RX ADMIN — LIDOCAINE HYDROCHLORIDE 15 ML: 20 SOLUTION ORAL at 05:08

## 2025-08-17 RX ADMIN — SODIUM CHLORIDE 1000 ML: 9 INJECTION, SOLUTION INTRAVENOUS at 04:08

## 2025-08-17 RX ADMIN — ALUMINUM HYDROXIDE, MAGNESIUM HYDROXIDE, AND SIMETHICONE 30 ML: 200; 200; 20 SUSPENSION ORAL at 05:08

## 2025-08-18 PROBLEM — N17.9 AKI (ACUTE KIDNEY INJURY): Status: ACTIVE | Noted: 2025-08-18

## 2025-08-18 LAB
25(OH)D3+25(OH)D2 SERPL-MCNC: 102 NG/ML (ref 30–80)
ANION GAP SERPL CALC-SCNC: 7 MEQ/L
BASOPHILS # BLD AUTO: 0.03 X10(3)/MCL
BASOPHILS NFR BLD AUTO: 0.9 %
BUN SERPL-MCNC: 12.9 MG/DL (ref 9.8–20.1)
CALCIUM SERPL-MCNC: 8.8 MG/DL (ref 8.4–10.2)
CHLORIDE SERPL-SCNC: 110 MMOL/L (ref 98–107)
CO2 SERPL-SCNC: 25 MMOL/L (ref 23–31)
CREAT SERPL-MCNC: 0.94 MG/DL (ref 0.55–1.02)
CREAT/UREA NIT SERPL: 14
EOSINOPHIL # BLD AUTO: 0.08 X10(3)/MCL (ref 0–0.9)
EOSINOPHIL NFR BLD AUTO: 2.3 %
ERYTHROCYTE [DISTWIDTH] IN BLOOD BY AUTOMATED COUNT: 14.4 % (ref 11.5–17)
FERRITIN SERPL-MCNC: 51.93 NG/ML (ref 4.63–204)
GFR SERPLBLD CREATININE-BSD FMLA CKD-EPI: >60 ML/MIN/1.73/M2
GLUCOSE SERPL-MCNC: 87 MG/DL (ref 82–115)
HCT VFR BLD AUTO: 35.9 % (ref 37–47)
HGB BLD-MCNC: 11.8 G/DL (ref 12–16)
IMM GRANULOCYTES # BLD AUTO: 0 X10(3)/MCL (ref 0–0.04)
IMM GRANULOCYTES NFR BLD AUTO: 0 %
IRON SATN MFR SERPL: 23 % (ref 20–50)
IRON SERPL-MCNC: 69 UG/DL (ref 50–170)
LYMPHOCYTES # BLD AUTO: 1.5 X10(3)/MCL (ref 0.6–4.6)
LYMPHOCYTES NFR BLD AUTO: 43.1 %
MCH RBC QN AUTO: 28.6 PG (ref 27–31)
MCHC RBC AUTO-ENTMCNC: 32.9 G/DL (ref 33–36)
MCV RBC AUTO: 87.1 FL (ref 80–94)
MONOCYTES # BLD AUTO: 0.27 X10(3)/MCL (ref 0.1–1.3)
MONOCYTES NFR BLD AUTO: 7.8 %
NEUTROPHILS # BLD AUTO: 1.6 X10(3)/MCL (ref 2.1–9.2)
NEUTROPHILS NFR BLD AUTO: 45.9 %
NRBC BLD AUTO-RTO: 0 %
OHS QRS DURATION: 76 MS
OHS QRS DURATION: 80 MS
OHS QTC CALCULATION: 438 MS
OHS QTC CALCULATION: 448 MS
PLATELET # BLD AUTO: 129 X10(3)/MCL (ref 130–400)
PLATELETS.RETICULATED NFR BLD AUTO: 3.2 % (ref 0.9–11.2)
PMV BLD AUTO: 11.1 FL (ref 7.4–10.4)
POTASSIUM SERPL-SCNC: 3.7 MMOL/L (ref 3.5–5.1)
RBC # BLD AUTO: 4.12 X10(6)/MCL (ref 4.2–5.4)
SODIUM SERPL-SCNC: 142 MMOL/L (ref 136–145)
TIBC SERPL-MCNC: 237 UG/DL (ref 70–310)
TIBC SERPL-MCNC: 306 UG/DL (ref 250–450)
TRANSFERRIN SERPL-MCNC: 289 MG/DL
VIT B12 SERPL-MCNC: 651 PG/ML (ref 213–816)
WBC # BLD AUTO: 3.48 X10(3)/MCL (ref 4.5–11.5)

## 2025-08-18 PROCEDURE — 83550 IRON BINDING TEST: CPT | Performed by: STUDENT IN AN ORGANIZED HEALTH CARE EDUCATION/TRAINING PROGRAM

## 2025-08-18 PROCEDURE — 82607 VITAMIN B-12: CPT | Performed by: STUDENT IN AN ORGANIZED HEALTH CARE EDUCATION/TRAINING PROGRAM

## 2025-08-18 PROCEDURE — 96372 THER/PROPH/DIAG INJ SC/IM: CPT | Performed by: INTERNAL MEDICINE

## 2025-08-18 PROCEDURE — 63600175 PHARM REV CODE 636 W HCPCS: Performed by: INTERNAL MEDICINE

## 2025-08-18 PROCEDURE — G0378 HOSPITAL OBSERVATION PER HR: HCPCS

## 2025-08-18 PROCEDURE — 25000003 PHARM REV CODE 250: Performed by: CLINICAL NURSE SPECIALIST

## 2025-08-18 PROCEDURE — 85025 COMPLETE CBC W/AUTO DIFF WBC: CPT | Performed by: INTERNAL MEDICINE

## 2025-08-18 PROCEDURE — 25000003 PHARM REV CODE 250: Performed by: INTERNAL MEDICINE

## 2025-08-18 PROCEDURE — 82306 VITAMIN D 25 HYDROXY: CPT | Performed by: STUDENT IN AN ORGANIZED HEALTH CARE EDUCATION/TRAINING PROGRAM

## 2025-08-18 PROCEDURE — 82728 ASSAY OF FERRITIN: CPT | Performed by: STUDENT IN AN ORGANIZED HEALTH CARE EDUCATION/TRAINING PROGRAM

## 2025-08-18 PROCEDURE — 80048 BASIC METABOLIC PNL TOTAL CA: CPT | Performed by: INTERNAL MEDICINE

## 2025-08-18 RX ORDER — HYDRALAZINE HYDROCHLORIDE 20 MG/ML
10 INJECTION INTRAMUSCULAR; INTRAVENOUS EVERY 6 HOURS PRN
Status: DISCONTINUED | OUTPATIENT
Start: 2025-08-18 | End: 2025-08-20 | Stop reason: HOSPADM

## 2025-08-18 RX ORDER — HYDRALAZINE HYDROCHLORIDE 50 MG/1
50 TABLET, FILM COATED ORAL EVERY 8 HOURS
Status: DISCONTINUED | OUTPATIENT
Start: 2025-08-18 | End: 2025-08-19

## 2025-08-18 RX ADMIN — HEPARIN SODIUM 5000 UNITS: 5000 INJECTION, SOLUTION INTRAVENOUS; SUBCUTANEOUS at 05:08

## 2025-08-18 RX ADMIN — HEPARIN SODIUM 5000 UNITS: 5000 INJECTION, SOLUTION INTRAVENOUS; SUBCUTANEOUS at 02:08

## 2025-08-18 RX ADMIN — CARVEDILOL 12.5 MG: 12.5 TABLET, FILM COATED ORAL at 07:08

## 2025-08-18 RX ADMIN — PANTOPRAZOLE SODIUM 40 MG: 40 TABLET, DELAYED RELEASE ORAL at 07:08

## 2025-08-18 RX ADMIN — Medication 2000 UNITS: at 07:08

## 2025-08-18 RX ADMIN — ASPIRIN 81 MG: 81 TABLET, CHEWABLE ORAL at 07:08

## 2025-08-18 RX ADMIN — HYDRALAZINE HYDROCHLORIDE 50 MG: 50 TABLET ORAL at 09:08

## 2025-08-18 RX ADMIN — HEPARIN SODIUM 5000 UNITS: 5000 INJECTION, SOLUTION INTRAVENOUS; SUBCUTANEOUS at 09:08

## 2025-08-18 RX ADMIN — HYDRALAZINE HYDROCHLORIDE 50 MG: 50 TABLET ORAL at 12:08

## 2025-08-18 RX ADMIN — NIFEDIPINE 60 MG: 60 TABLET, FILM COATED, EXTENDED RELEASE ORAL at 07:08

## 2025-08-19 LAB
ANION GAP SERPL CALC-SCNC: 5 MEQ/L
APICAL FOUR CHAMBER EJECTION FRACTION: 55 %
APICAL TWO CHAMBER EJECTION FRACTION: 63 %
AV INDEX (PROSTH): 0.7
AV MEAN GRADIENT: 3 MMHG
AV PEAK GRADIENT: 6 MMHG
AV VALVE AREA BY VELOCITY RATIO: 2.4 CM²
AV VALVE AREA: 2.2 CM²
AV VELOCITY RATIO: 0.75
BASOPHILS # BLD AUTO: 0.04 X10(3)/MCL
BASOPHILS NFR BLD AUTO: 1 %
BSA FOR ECHO PROCEDURE: 1.93 M2
BUN SERPL-MCNC: 15.6 MG/DL (ref 9.8–20.1)
CALCIUM SERPL-MCNC: 9 MG/DL (ref 8.4–10.2)
CHLORIDE SERPL-SCNC: 109 MMOL/L (ref 98–107)
CO2 SERPL-SCNC: 26 MMOL/L (ref 23–31)
CREAT SERPL-MCNC: 0.91 MG/DL (ref 0.55–1.02)
CREAT/UREA NIT SERPL: 17
CV ECHO LV RWT: 0.45 CM
DOP CALC AO PEAK VEL: 1.2 M/S
DOP CALC AO VTI: 31.8 CM
DOP CALC LVOT AREA: 3.1 CM2
DOP CALC LVOT DIAMETER: 2 CM
DOP CALC LVOT PEAK VEL: 0.9 M/S
DOP CALC MV VTI: 27.3 CM
DOP CALCLVOT PEAK VEL VTI: 22.2 CM
E WAVE DECELERATION TIME: 304 MSEC
E/A RATIO: 0.69
E/E' RATIO: 9 M/S
ECHO LV POSTERIOR WALL: 1 CM (ref 0.6–1.1)
EOSINOPHIL # BLD AUTO: 0.13 X10(3)/MCL (ref 0–0.9)
EOSINOPHIL NFR BLD AUTO: 3.2 %
ERYTHROCYTE [DISTWIDTH] IN BLOOD BY AUTOMATED COUNT: 14.6 % (ref 11.5–17)
FOLATE SERPL-MCNC: 13 NG/ML (ref 7–31.4)
FRACTIONAL SHORTENING: 36.4 % (ref 28–44)
GFR SERPLBLD CREATININE-BSD FMLA CKD-EPI: >60 ML/MIN/1.73/M2
GLUCOSE SERPL-MCNC: 93 MG/DL (ref 82–115)
HCT VFR BLD AUTO: 38.1 % (ref 37–47)
HGB BLD-MCNC: 12.5 G/DL (ref 12–16)
IMM GRANULOCYTES # BLD AUTO: 0.01 X10(3)/MCL (ref 0–0.04)
IMM GRANULOCYTES NFR BLD AUTO: 0.2 %
INTERVENTRICULAR SEPTUM: 0.9 CM (ref 0.6–1.1)
LEFT ATRIUM AREA SYSTOLIC (APICAL 2 CHAMBER): 17.1 CM2
LEFT ATRIUM AREA SYSTOLIC (APICAL 4 CHAMBER): 17.4 CM2
LEFT ATRIUM SIZE: 3.4 CM
LEFT ATRIUM VOLUME INDEX MOD: 25 ML/M2
LEFT ATRIUM VOLUME MOD: 46 ML
LEFT INTERNAL DIMENSION IN SYSTOLE: 2.8 CM (ref 2.1–4)
LEFT VENTRICLE DIASTOLIC VOLUME INDEX: 47.57 ML/M2
LEFT VENTRICLE DIASTOLIC VOLUME: 88 ML
LEFT VENTRICLE END DIASTOLIC VOLUME APICAL 2 CHAMBER: 71.6 ML
LEFT VENTRICLE END DIASTOLIC VOLUME APICAL 4 CHAMBER INDEX BSA: 29.57 ML/M2
LEFT VENTRICLE END DIASTOLIC VOLUME APICAL 4 CHAMBER: 54.7 ML
LEFT VENTRICLE END SYSTOLIC VOLUME APICAL 2 CHAMBER: 45 ML
LEFT VENTRICLE END SYSTOLIC VOLUME APICAL 4 CHAMBER: 41 ML
LEFT VENTRICLE MASS INDEX: 74.5 G/M2
LEFT VENTRICLE SYSTOLIC VOLUME INDEX: 15.7 ML/M2
LEFT VENTRICLE SYSTOLIC VOLUME: 29 ML
LEFT VENTRICULAR INTERNAL DIMENSION IN DIASTOLE: 4.4 CM (ref 3.5–6)
LEFT VENTRICULAR MASS: 137.8 G
LV LATERAL E/E' RATIO: 7.8 M/S
LV SEPTAL E/E' RATIO: 10.3 M/S
LVED V (TEICH): 88.2 ML
LVES V (TEICH): 29 ML
LVOT MG: 2 MMHG
LVOT MV: 0.61 CM/S
LYMPHOCYTES # BLD AUTO: 1.4 X10(3)/MCL (ref 0.6–4.6)
LYMPHOCYTES NFR BLD AUTO: 34.1 %
MCH RBC QN AUTO: 28.5 PG (ref 27–31)
MCHC RBC AUTO-ENTMCNC: 32.8 G/DL (ref 33–36)
MCV RBC AUTO: 86.8 FL (ref 80–94)
MONOCYTES # BLD AUTO: 0.31 X10(3)/MCL (ref 0.1–1.3)
MONOCYTES NFR BLD AUTO: 7.5 %
MV MEAN GRADIENT: 1 MMHG
MV PEAK A VEL: 0.9 M/S
MV PEAK E VEL: 0.62 M/S
MV PEAK GRADIENT: 4 MMHG
MV VALVE AREA BY CONTINUITY EQUATION: 2.55 CM2
NEUTROPHILS # BLD AUTO: 2.22 X10(3)/MCL (ref 2.1–9.2)
NEUTROPHILS NFR BLD AUTO: 54 %
NRBC BLD AUTO-RTO: 0 %
OHS CV CPX PATIENT HEIGHT IN: 61
OHS CV RV/LV RATIO: 0.75 CM
OHS LV EJECTION FRACTION SIMPSONS BIPLANE MOD: 61 %
PLATELET # BLD AUTO: 139 X10(3)/MCL (ref 130–400)
PMV BLD AUTO: 11.4 FL (ref 7.4–10.4)
POTASSIUM SERPL-SCNC: 3.9 MMOL/L (ref 3.5–5.1)
RA WIDTH: 3.74 CM
RBC # BLD AUTO: 4.39 X10(6)/MCL (ref 4.2–5.4)
RIGHT VENTRICLE DIASTOLIC BASEL DIMENSION: 3.3 CM
RIGHT VENTRICULAR END-DIASTOLIC DIMENSION: 3.34 CM
SODIUM SERPL-SCNC: 140 MMOL/L (ref 136–145)
TDI LATERAL: 0.08 M/S
TDI SEPTAL: 0.06 M/S
TDI: 0.07 M/S
TRICUSPID ANNULAR PLANE SYSTOLIC EXCURSION: 1.8 CM
TROPONIN I SERPL HS-MCNC: 28 NG/L
WBC # BLD AUTO: 4.11 X10(3)/MCL (ref 4.5–11.5)
Z-SCORE OF LEFT VENTRICULAR DIMENSION IN END DIASTOLE: -1.55
Z-SCORE OF LEFT VENTRICULAR DIMENSION IN END SYSTOLE: -0.99

## 2025-08-19 PROCEDURE — 82746 ASSAY OF FOLIC ACID SERUM: CPT | Performed by: STUDENT IN AN ORGANIZED HEALTH CARE EDUCATION/TRAINING PROGRAM

## 2025-08-19 PROCEDURE — 25000003 PHARM REV CODE 250

## 2025-08-19 PROCEDURE — 36415 COLL VENOUS BLD VENIPUNCTURE: CPT | Performed by: STUDENT IN AN ORGANIZED HEALTH CARE EDUCATION/TRAINING PROGRAM

## 2025-08-19 PROCEDURE — 93005 ELECTROCARDIOGRAM TRACING: CPT

## 2025-08-19 PROCEDURE — G0378 HOSPITAL OBSERVATION PER HR: HCPCS

## 2025-08-19 PROCEDURE — 80048 BASIC METABOLIC PNL TOTAL CA: CPT | Performed by: INTERNAL MEDICINE

## 2025-08-19 PROCEDURE — 93010 ELECTROCARDIOGRAM REPORT: CPT | Mod: ,,, | Performed by: INTERNAL MEDICINE

## 2025-08-19 PROCEDURE — 96372 THER/PROPH/DIAG INJ SC/IM: CPT | Performed by: INTERNAL MEDICINE

## 2025-08-19 PROCEDURE — 85025 COMPLETE CBC W/AUTO DIFF WBC: CPT | Performed by: INTERNAL MEDICINE

## 2025-08-19 PROCEDURE — 25000003 PHARM REV CODE 250: Performed by: INTERNAL MEDICINE

## 2025-08-19 PROCEDURE — 25000003 PHARM REV CODE 250: Performed by: STUDENT IN AN ORGANIZED HEALTH CARE EDUCATION/TRAINING PROGRAM

## 2025-08-19 PROCEDURE — 63600175 PHARM REV CODE 636 W HCPCS: Performed by: INTERNAL MEDICINE

## 2025-08-19 PROCEDURE — 84484 ASSAY OF TROPONIN QUANT: CPT | Performed by: STUDENT IN AN ORGANIZED HEALTH CARE EDUCATION/TRAINING PROGRAM

## 2025-08-19 RX ORDER — CARVEDILOL 12.5 MG/1
12.5 TABLET ORAL 2 TIMES DAILY
Qty: 180 TABLET | Refills: 3 | Status: SHIPPED | OUTPATIENT
Start: 2025-08-19 | End: 2025-08-19

## 2025-08-19 RX ORDER — CARVEDILOL 12.5 MG/1
6.25 TABLET ORAL 2 TIMES DAILY
Qty: 90 TABLET | Refills: 3 | Status: SHIPPED | OUTPATIENT
Start: 2025-08-19 | End: 2026-08-19

## 2025-08-19 RX ORDER — SIMETHICONE 80 MG
1 TABLET,CHEWABLE ORAL 3 TIMES DAILY PRN
Status: DISCONTINUED | OUTPATIENT
Start: 2025-08-19 | End: 2025-08-20

## 2025-08-19 RX ORDER — HYDRALAZINE HYDROCHLORIDE 25 MG/1
75 TABLET, FILM COATED ORAL EVERY 8 HOURS
Qty: 270 TABLET | Refills: 11 | Status: SHIPPED | OUTPATIENT
Start: 2025-08-19 | End: 2026-08-19

## 2025-08-19 RX ADMIN — SIMETHICONE 80 MG: 80 TABLET, CHEWABLE ORAL at 09:08

## 2025-08-19 RX ADMIN — HYDRALAZINE HYDROCHLORIDE 75 MG: 50 TABLET ORAL at 03:08

## 2025-08-19 RX ADMIN — CARVEDILOL 12.5 MG: 12.5 TABLET, FILM COATED ORAL at 11:08

## 2025-08-19 RX ADMIN — HEPARIN SODIUM 5000 UNITS: 5000 INJECTION, SOLUTION INTRAVENOUS; SUBCUTANEOUS at 05:08

## 2025-08-19 RX ADMIN — NIFEDIPINE 60 MG: 60 TABLET, FILM COATED, EXTENDED RELEASE ORAL at 08:08

## 2025-08-19 RX ADMIN — Medication 2000 UNITS: at 08:08

## 2025-08-19 RX ADMIN — HEPARIN SODIUM 5000 UNITS: 5000 INJECTION, SOLUTION INTRAVENOUS; SUBCUTANEOUS at 08:08

## 2025-08-19 RX ADMIN — HEPARIN SODIUM 5000 UNITS: 5000 INJECTION, SOLUTION INTRAVENOUS; SUBCUTANEOUS at 03:08

## 2025-08-19 RX ADMIN — CARVEDILOL 12.5 MG: 12.5 TABLET, FILM COATED ORAL at 08:08

## 2025-08-19 RX ADMIN — HYDRALAZINE HYDROCHLORIDE 75 MG: 50 TABLET ORAL at 08:08

## 2025-08-19 RX ADMIN — PANTOPRAZOLE SODIUM 40 MG: 40 TABLET, DELAYED RELEASE ORAL at 08:08

## 2025-08-20 VITALS
DIASTOLIC BLOOD PRESSURE: 66 MMHG | HEIGHT: 61 IN | RESPIRATION RATE: 16 BRPM | TEMPERATURE: 98 F | OXYGEN SATURATION: 98 % | BODY MASS INDEX: 36.17 KG/M2 | SYSTOLIC BLOOD PRESSURE: 155 MMHG | WEIGHT: 191.56 LBS | HEART RATE: 60 BPM

## 2025-08-20 LAB
ANION GAP SERPL CALC-SCNC: 6 MEQ/L
BASOPHILS # BLD AUTO: 0.05 X10(3)/MCL
BASOPHILS NFR BLD AUTO: 1 %
BUN SERPL-MCNC: 20.2 MG/DL (ref 9.8–20.1)
CALCIUM SERPL-MCNC: 9.3 MG/DL (ref 8.4–10.2)
CHLORIDE SERPL-SCNC: 109 MMOL/L (ref 98–107)
CO2 SERPL-SCNC: 24 MMOL/L (ref 23–31)
CREAT SERPL-MCNC: 1.1 MG/DL (ref 0.55–1.02)
CREAT/UREA NIT SERPL: 18
EOSINOPHIL # BLD AUTO: 0.06 X10(3)/MCL (ref 0–0.9)
EOSINOPHIL NFR BLD AUTO: 1.2 %
ERYTHROCYTE [DISTWIDTH] IN BLOOD BY AUTOMATED COUNT: 14.7 % (ref 11.5–17)
GFR SERPLBLD CREATININE-BSD FMLA CKD-EPI: 50 ML/MIN/1.73/M2
GLUCOSE SERPL-MCNC: 106 MG/DL (ref 82–115)
HCT VFR BLD AUTO: 37.5 % (ref 37–47)
HGB BLD-MCNC: 12.3 G/DL (ref 12–16)
IMM GRANULOCYTES # BLD AUTO: 0.01 X10(3)/MCL (ref 0–0.04)
IMM GRANULOCYTES NFR BLD AUTO: 0.2 %
LYMPHOCYTES # BLD AUTO: 1.53 X10(3)/MCL (ref 0.6–4.6)
LYMPHOCYTES NFR BLD AUTO: 30.2 %
MCH RBC QN AUTO: 28.3 PG (ref 27–31)
MCHC RBC AUTO-ENTMCNC: 32.8 G/DL (ref 33–36)
MCV RBC AUTO: 86.4 FL (ref 80–94)
MONOCYTES # BLD AUTO: 0.26 X10(3)/MCL (ref 0.1–1.3)
MONOCYTES NFR BLD AUTO: 5.1 %
NEUTROPHILS # BLD AUTO: 3.16 X10(3)/MCL (ref 2.1–9.2)
NEUTROPHILS NFR BLD AUTO: 62.3 %
NRBC BLD AUTO-RTO: 0 %
OHS CV CPX 85 PERCENT MAX PREDICTED HEART RATE MALE: 117
OHS CV CPX MAX PREDICTED HEART RATE: 138
OHS CV CPX PATIENT IS FEMALE: 1
OHS CV CPX PATIENT IS MALE: 0
OHS CV INITIAL DOSE: 10 MCG/KG/MIN
OHS CV PEAK DOSE: 31.8 MCG/KG/MIN
OHS QRS DURATION: 90 MS
OHS QTC CALCULATION: 474 MS
PLATELET # BLD AUTO: 153 X10(3)/MCL (ref 130–400)
PMV BLD AUTO: 11.1 FL (ref 7.4–10.4)
POTASSIUM SERPL-SCNC: 4.1 MMOL/L (ref 3.5–5.1)
RBC # BLD AUTO: 4.34 X10(6)/MCL (ref 4.2–5.4)
SODIUM SERPL-SCNC: 139 MMOL/L (ref 136–145)
TROPONIN I SERPL HS-MCNC: 30 NG/L
WBC # BLD AUTO: 5.07 X10(3)/MCL (ref 4.5–11.5)

## 2025-08-20 PROCEDURE — 25000003 PHARM REV CODE 250

## 2025-08-20 PROCEDURE — 80048 BASIC METABOLIC PNL TOTAL CA: CPT | Performed by: INTERNAL MEDICINE

## 2025-08-20 PROCEDURE — A9500 TC99M SESTAMIBI: HCPCS | Performed by: STUDENT IN AN ORGANIZED HEALTH CARE EDUCATION/TRAINING PROGRAM

## 2025-08-20 PROCEDURE — 25000003 PHARM REV CODE 250: Performed by: STUDENT IN AN ORGANIZED HEALTH CARE EDUCATION/TRAINING PROGRAM

## 2025-08-20 PROCEDURE — 96372 THER/PROPH/DIAG INJ SC/IM: CPT | Performed by: INTERNAL MEDICINE

## 2025-08-20 PROCEDURE — 85025 COMPLETE CBC W/AUTO DIFF WBC: CPT | Performed by: INTERNAL MEDICINE

## 2025-08-20 PROCEDURE — 25000003 PHARM REV CODE 250: Performed by: INTERNAL MEDICINE

## 2025-08-20 PROCEDURE — 36415 COLL VENOUS BLD VENIPUNCTURE: CPT

## 2025-08-20 PROCEDURE — G0378 HOSPITAL OBSERVATION PER HR: HCPCS

## 2025-08-20 PROCEDURE — 84484 ASSAY OF TROPONIN QUANT: CPT

## 2025-08-20 PROCEDURE — 36415 COLL VENOUS BLD VENIPUNCTURE: CPT | Performed by: INTERNAL MEDICINE

## 2025-08-20 PROCEDURE — 96360 HYDRATION IV INFUSION INIT: CPT

## 2025-08-20 PROCEDURE — 63600175 PHARM REV CODE 636 W HCPCS: Performed by: STUDENT IN AN ORGANIZED HEALTH CARE EDUCATION/TRAINING PROGRAM

## 2025-08-20 PROCEDURE — 63600175 PHARM REV CODE 636 W HCPCS: Performed by: INTERNAL MEDICINE

## 2025-08-20 RX ORDER — ALUMINUM HYDROXIDE, MAGNESIUM HYDROXIDE, AND SIMETHICONE 2400; 240; 2400 MG/30ML; MG/30ML; MG/30ML
10 SUSPENSION ORAL EVERY 6 HOURS PRN
Qty: 335 ML | Refills: 0 | Status: SHIPPED | OUTPATIENT
Start: 2025-08-20 | End: 2026-08-20

## 2025-08-20 RX ORDER — LIDOCAINE HYDROCHLORIDE 20 MG/ML
15 SOLUTION OROPHARYNGEAL ONCE
Status: DISCONTINUED | OUTPATIENT
Start: 2025-08-20 | End: 2025-08-20

## 2025-08-20 RX ORDER — TETRAKIS(2-METHOXYISOBUTYLISOCYANIDE)COPPER(I) TETRAFLUOROBORATE 1 MG/ML
10 INJECTION, POWDER, LYOPHILIZED, FOR SOLUTION INTRAVENOUS
Status: COMPLETED | OUTPATIENT
Start: 2025-08-20 | End: 2025-08-20

## 2025-08-20 RX ORDER — LIDOCAINE HYDROCHLORIDE 20 MG/ML
15 SOLUTION OROPHARYNGEAL ONCE
Status: COMPLETED | OUTPATIENT
Start: 2025-08-20 | End: 2025-08-20

## 2025-08-20 RX ORDER — TETRAKIS(2-METHOXYISOBUTYLISOCYANIDE)COPPER(I) TETRAFLUOROBORATE 1 MG/ML
31.8 INJECTION, POWDER, LYOPHILIZED, FOR SOLUTION INTRAVENOUS
Status: COMPLETED | OUTPATIENT
Start: 2025-08-20 | End: 2025-08-20

## 2025-08-20 RX ORDER — ALUMINUM HYDROXIDE, MAGNESIUM HYDROXIDE, AND SIMETHICONE 1200; 120; 1200 MG/30ML; MG/30ML; MG/30ML
30 SUSPENSION ORAL ONCE
Status: COMPLETED | OUTPATIENT
Start: 2025-08-20 | End: 2025-08-20

## 2025-08-20 RX ORDER — REGADENOSON 0.08 MG/ML
0.4 INJECTION, SOLUTION INTRAVENOUS
Status: COMPLETED | OUTPATIENT
Start: 2025-08-20 | End: 2025-08-20

## 2025-08-20 RX ORDER — ALUMINUM HYDROXIDE, MAGNESIUM HYDROXIDE, AND SIMETHICONE 1200; 120; 1200 MG/30ML; MG/30ML; MG/30ML
30 SUSPENSION ORAL ONCE
Status: DISCONTINUED | OUTPATIENT
Start: 2025-08-20 | End: 2025-08-20

## 2025-08-20 RX ADMIN — REGADENOSON 0.4 MG: 0.08 INJECTION, SOLUTION INTRAVENOUS at 08:08

## 2025-08-20 RX ADMIN — HEPARIN SODIUM 5000 UNITS: 5000 INJECTION, SOLUTION INTRAVENOUS; SUBCUTANEOUS at 06:08

## 2025-08-20 RX ADMIN — CARVEDILOL 12.5 MG: 12.5 TABLET, FILM COATED ORAL at 11:08

## 2025-08-20 RX ADMIN — LIDOCAINE HYDROCHLORIDE 15 ML: 20 SOLUTION ORAL at 12:08

## 2025-08-20 RX ADMIN — SODIUM CHLORIDE 500 ML: 9 INJECTION, SOLUTION INTRAVENOUS at 11:08

## 2025-08-20 RX ADMIN — KIT FOR THE PREPARATION OF TECHNETIUM TC99M SESTAMIBI 31.8 MILLICURIE: 1 INJECTION, POWDER, LYOPHILIZED, FOR SOLUTION PARENTERAL at 08:08

## 2025-08-20 RX ADMIN — Medication 2000 UNITS: at 11:08

## 2025-08-20 RX ADMIN — ALUMINUM HYDROXIDE, MAGNESIUM HYDROXIDE, AND SIMETHICONE 30 ML: 200; 200; 20 SUSPENSION ORAL at 12:08

## 2025-08-20 RX ADMIN — NIFEDIPINE 60 MG: 60 TABLET, FILM COATED, EXTENDED RELEASE ORAL at 11:08

## 2025-08-20 RX ADMIN — ASPIRIN 81 MG: 81 TABLET, CHEWABLE ORAL at 11:08

## 2025-08-20 RX ADMIN — KIT FOR THE PREPARATION OF TECHNETIUM TC99M SESTAMIBI 10 MILLICURIE: 1 INJECTION, POWDER, LYOPHILIZED, FOR SOLUTION PARENTERAL at 08:08

## 2025-08-20 RX ADMIN — PANTOPRAZOLE SODIUM 40 MG: 40 TABLET, DELAYED RELEASE ORAL at 11:08

## (undated) DEVICE — KIT GEN LAPAROSCOPY LAFAYETTE

## (undated) DEVICE — SUT VICRYL PLUS 0 CT1 18IN

## (undated) DEVICE — ELECTRODE PATIENT RETURN DISP

## (undated) DEVICE — KIT SURGICAL COLON .25 1.1OZ

## (undated) DEVICE — SOL NACL IRR 1000ML BTL

## (undated) DEVICE — SYR 10CC LUER LOCK

## (undated) DEVICE — SYR 30CC LUER LOCK

## (undated) DEVICE — TROCAR ENDOPATH XCEL 11X100MM

## (undated) DEVICE — Device

## (undated) DEVICE — KIT CANIST SUCTION 1200CC

## (undated) DEVICE — SUT VICRYL+ 27 UR-6 VIOL

## (undated) DEVICE — ELECTRODE BLD EXT 6.50 ST DISP

## (undated) DEVICE — SUT PDS PLUS 1 TP1 96IN

## (undated) DEVICE — SUT 1 48IN PDS II VIO MONO

## (undated) DEVICE — HOLDER STRIP-T SELF ADH 2X10IN

## (undated) DEVICE — DRAPE MEDIUM SHEET 40X70IN

## (undated) DEVICE — CONTAINER SPECIMEN SCREW 4OZ

## (undated) DEVICE — TRAY CATH 1-LYR URIMTR 16FR

## (undated) DEVICE — TOWEL OR WHT XRAY 16X26 2/PK

## (undated) DEVICE — ADAPTER DUAL NSL LUER M-M 7FT

## (undated) DEVICE — COVER TABLE HVY DTY 60X90IN

## (undated) DEVICE — UNDERPAD DISPOSABLE 30X30IN

## (undated) DEVICE — STRAP POS KNEE BODY 4X60IN

## (undated) DEVICE — SCISSOR CURVED ENDOPATH 5MM

## (undated) DEVICE — TIP SUCTION YANKAUER

## (undated) DEVICE — GLOVE SIGNATURE ESSNTL LTX 8.5

## (undated) DEVICE — SUT VICRYL PLUS 4-0 FS-2 27IN

## (undated) DEVICE — TOWEL OR DISP STRL BLUE 4/PK

## (undated) DEVICE — SHEARS HARMONIC CRVD TIP 36CM

## (undated) DEVICE — SOL IRRI STRL WATER 1000ML

## (undated) DEVICE — WARMER DRAPE STERILE LF

## (undated) DEVICE — SYR DISP LL 5CC

## (undated) DEVICE — KIT SURGICAL TURNOVER

## (undated) DEVICE — CONTRAST ISOVUE 300 50ML

## (undated) DEVICE — NDL BLUNT FILL 18G 1IN

## (undated) DEVICE — BLOCK BLOX BITE DENT RIM 54FR

## (undated) DEVICE — TROCAR ENDOPATH XCEL 11MM 10CM

## (undated) DEVICE — SPONGE LAP 18X18 PREWASHED

## (undated) DEVICE — STRIP MEDI WND CLSR 1/2X4IN

## (undated) DEVICE — CANNULA ENDOPATH XCEL 5X100MM

## (undated) DEVICE — DRAPE FLUID WARMER ORS 44X44IN

## (undated) DEVICE — NDL ANES SPINAL 18X3.5ST 18G

## (undated) DEVICE — SUT SILK 3-0 SH 18IN BLACK

## (undated) DEVICE — STAPLER SKIN PROXIMATE WIDE

## (undated) DEVICE — NDL SAFETY 25G X 1.5 ECLIPSE

## (undated) DEVICE — COLLECTION SPECIMEN NEPTUNE

## (undated) DEVICE — GLOVE PROTEXIS HYDROGEL SZ7.5

## (undated) DEVICE — NDL HYPO 22GX1 1/2 SYR 10ML LL

## (undated) DEVICE — SUT SILK 2-0 SH 18IN BLACK

## (undated) DEVICE — IRRIGATOR HYDRO-SURG PLUS 5MM

## (undated) DEVICE — APPLICATOR CHLORAPREP ORN 26ML

## (undated) DEVICE — TROCAR ENDOPATH XCEL 5X100MM

## (undated) DEVICE — BANDAGE ADHESIVE FABRIC 2X4

## (undated) DEVICE — COVER MAYO STND XL 30X57IN

## (undated) DEVICE — SUT VICRYL PLUS 3-0 SH 18IN